# Patient Record
Sex: FEMALE | Race: WHITE | NOT HISPANIC OR LATINO | Employment: OTHER | URBAN - METROPOLITAN AREA
[De-identification: names, ages, dates, MRNs, and addresses within clinical notes are randomized per-mention and may not be internally consistent; named-entity substitution may affect disease eponyms.]

---

## 2017-01-05 ENCOUNTER — AMBULATORY - HEALTHEAST (OUTPATIENT)
Dept: INTERNAL MEDICINE | Facility: CLINIC | Age: 82
End: 2017-01-05

## 2017-01-05 DIAGNOSIS — R91.8 PULMONARY NODULES: ICD-10-CM

## 2017-01-06 ENCOUNTER — HOSPITAL ENCOUNTER (OUTPATIENT)
Dept: CT IMAGING | Facility: CLINIC | Age: 82
Discharge: HOME | End: 2017-01-06
Attending: INTERNAL MEDICINE

## 2017-01-06 DIAGNOSIS — R91.8 PULMONARY NODULES: ICD-10-CM

## 2017-01-09 ENCOUNTER — OFFICE VISIT - HEALTHEAST (OUTPATIENT)
Dept: INTERNAL MEDICINE | Facility: CLINIC | Age: 82
End: 2017-01-09

## 2017-01-09 DIAGNOSIS — R91.8 PULMONARY NODULES: ICD-10-CM

## 2017-01-09 DIAGNOSIS — R55 NEAR SYNCOPE: ICD-10-CM

## 2017-01-09 DIAGNOSIS — I42.8 NONISCHEMIC CARDIOMYOPATHY (H): ICD-10-CM

## 2017-01-09 DIAGNOSIS — F03.90 DEMENTIA (H): ICD-10-CM

## 2017-01-09 ASSESSMENT — MIFFLIN-ST. JEOR: SCORE: 980.66

## 2017-01-19 ENCOUNTER — OFFICE VISIT - HEALTHEAST (OUTPATIENT)
Dept: INTERNAL MEDICINE | Facility: CLINIC | Age: 82
End: 2017-01-19

## 2017-01-19 ENCOUNTER — OFFICE VISIT - HEALTHEAST (OUTPATIENT)
Dept: CARDIOLOGY | Facility: CLINIC | Age: 82
End: 2017-01-19

## 2017-01-19 DIAGNOSIS — I10 ESSENTIAL HYPERTENSION: ICD-10-CM

## 2017-01-19 DIAGNOSIS — F03.90 DEMENTIA (H): ICD-10-CM

## 2017-01-19 DIAGNOSIS — J20.9 ACUTE BRONCHITIS: ICD-10-CM

## 2017-01-19 DIAGNOSIS — I42.8 NONISCHEMIC CARDIOMYOPATHY (H): ICD-10-CM

## 2017-01-19 ASSESSMENT — MIFFLIN-ST. JEOR
SCORE: 967.05
SCORE: 971.59

## 2017-04-04 ENCOUNTER — OFFICE VISIT - HEALTHEAST (OUTPATIENT)
Dept: INTERNAL MEDICINE | Facility: CLINIC | Age: 82
End: 2017-04-04

## 2017-04-04 DIAGNOSIS — M25.511 CHRONIC RIGHT SHOULDER PAIN: ICD-10-CM

## 2017-04-04 DIAGNOSIS — G89.29 CHRONIC RIGHT SHOULDER PAIN: ICD-10-CM

## 2017-04-04 DIAGNOSIS — F03.90 DEMENTIA (H): ICD-10-CM

## 2017-04-04 DIAGNOSIS — I10 ESSENTIAL HYPERTENSION: ICD-10-CM

## 2017-04-04 DIAGNOSIS — K59.09 CHRONIC CONSTIPATION: ICD-10-CM

## 2017-04-04 DIAGNOSIS — K21.9 GERD (GASTROESOPHAGEAL REFLUX DISEASE): ICD-10-CM

## 2017-04-04 DIAGNOSIS — R55 NEAR SYNCOPE: ICD-10-CM

## 2017-04-04 DIAGNOSIS — E03.9 HYPOTHYROIDISM: ICD-10-CM

## 2017-04-04 DIAGNOSIS — R91.8 PULMONARY NODULES: ICD-10-CM

## 2017-04-04 DIAGNOSIS — D64.9 CHRONIC ANEMIA: ICD-10-CM

## 2017-04-04 DIAGNOSIS — M81.0 OSTEOPOROSIS: ICD-10-CM

## 2017-04-04 ASSESSMENT — MIFFLIN-ST. JEOR: SCORE: 985.2

## 2017-05-02 ENCOUNTER — COMMUNICATION - HEALTHEAST (OUTPATIENT)
Dept: INTERNAL MEDICINE | Facility: CLINIC | Age: 82
End: 2017-05-02

## 2017-05-05 ENCOUNTER — OFFICE VISIT - HEALTHEAST (OUTPATIENT)
Dept: INTERNAL MEDICINE | Facility: CLINIC | Age: 82
End: 2017-05-05

## 2017-05-05 DIAGNOSIS — F03.90 DEMENTIA (H): ICD-10-CM

## 2017-05-05 DIAGNOSIS — R63.0 APPETITE LOSS: ICD-10-CM

## 2017-05-05 DIAGNOSIS — M54.9 CHRONIC BACK PAIN: ICD-10-CM

## 2017-05-05 DIAGNOSIS — R53.82 CHRONIC FATIGUE: ICD-10-CM

## 2017-05-05 DIAGNOSIS — I10 ESSENTIAL HYPERTENSION: ICD-10-CM

## 2017-05-05 DIAGNOSIS — E03.9 HYPOTHYROIDISM: ICD-10-CM

## 2017-05-05 DIAGNOSIS — G89.29 CHRONIC BACK PAIN: ICD-10-CM

## 2017-05-05 ASSESSMENT — MIFFLIN-ST. JEOR: SCORE: 976.13

## 2017-05-06 ENCOUNTER — COMMUNICATION - HEALTHEAST (OUTPATIENT)
Dept: INTERNAL MEDICINE | Facility: CLINIC | Age: 82
End: 2017-05-06

## 2017-05-08 ENCOUNTER — COMMUNICATION - HEALTHEAST (OUTPATIENT)
Dept: INTERNAL MEDICINE | Facility: CLINIC | Age: 82
End: 2017-05-08

## 2017-05-22 ENCOUNTER — COMMUNICATION - HEALTHEAST (OUTPATIENT)
Dept: INTERNAL MEDICINE | Facility: CLINIC | Age: 82
End: 2017-05-22

## 2017-05-24 ENCOUNTER — COMMUNICATION - HEALTHEAST (OUTPATIENT)
Dept: INTERNAL MEDICINE | Facility: CLINIC | Age: 82
End: 2017-05-24

## 2017-06-05 ENCOUNTER — COMMUNICATION - HEALTHEAST (OUTPATIENT)
Dept: ADMINISTRATIVE | Facility: CLINIC | Age: 82
End: 2017-06-05

## 2017-06-21 ENCOUNTER — AMBULATORY - HEALTHEAST (OUTPATIENT)
Dept: CARDIOLOGY | Facility: CLINIC | Age: 82
End: 2017-06-21

## 2017-06-21 DIAGNOSIS — R55 PRE-SYNCOPE: ICD-10-CM

## 2017-06-21 DIAGNOSIS — I42.8 NON-ISCHEMIC CARDIOMYOPATHY (H): ICD-10-CM

## 2017-07-20 ENCOUNTER — COMMUNICATION - HEALTHEAST (OUTPATIENT)
Dept: INTERNAL MEDICINE | Facility: CLINIC | Age: 82
End: 2017-07-20

## 2017-07-20 ENCOUNTER — AMBULATORY - HEALTHEAST (OUTPATIENT)
Dept: INTERNAL MEDICINE | Facility: CLINIC | Age: 82
End: 2017-07-20

## 2017-07-25 ENCOUNTER — OFFICE VISIT - HEALTHEAST (OUTPATIENT)
Dept: CARDIOLOGY | Facility: CLINIC | Age: 82
End: 2017-07-25

## 2017-07-25 DIAGNOSIS — I42.8 NONISCHEMIC CARDIOMYOPATHY (H): ICD-10-CM

## 2017-07-25 ASSESSMENT — MIFFLIN-ST. JEOR: SCORE: 961.61

## 2017-07-27 ENCOUNTER — COMMUNICATION - HEALTHEAST (OUTPATIENT)
Dept: INTERNAL MEDICINE | Facility: CLINIC | Age: 82
End: 2017-07-27

## 2017-07-31 ENCOUNTER — HOSPITAL ENCOUNTER (OUTPATIENT)
Dept: CARDIOLOGY | Facility: CLINIC | Age: 82
Discharge: HOME | End: 2017-07-31
Attending: INTERNAL MEDICINE

## 2017-07-31 DIAGNOSIS — I42.8 NON-ISCHEMIC CARDIOMYOPATHY (H): ICD-10-CM

## 2017-07-31 DIAGNOSIS — I42.9 CARDIOMYOPATHY, UNSPECIFIED (H): ICD-10-CM

## 2017-07-31 DIAGNOSIS — R55 PRE-SYNCOPE: ICD-10-CM

## 2017-07-31 LAB
AORTIC ROOT: 2.6 CM
DOP CALC LVOT AREA: 3.46 CM2
DOP CALC LVOT DIAMETER: 2.1 CM
DOP CALC LVOT PEAK VEL: 72.1 CM/S
DOP CALC LVOT STROKE VOLUME: 47.1 CM3
DOP CALCLVOT PEAK VEL VTI: 13.6 CM
EJECTION FRACTION: 53 % (ref 55–75)
FRACTIONAL SHORTENING: 27.8 % (ref 28–44)
INTERVENTRICULAR SEPTUM IN END DIASTOLE: 1.6 CM (ref 0.6–0.9)
IVS/PW RATIO: 1.3
LEFT ATRIUM AREA: 14.2 CM2
LEFT ATRIUM SIZE: 2.5 CM
LEFT ATRIUM TO AORTIC ROOT RATIO: 0.96 NO UNITS
LEFT VENTRICLE DIASTOLIC VOLUME: 62 CM3 (ref 46–106)
LEFT VENTRICLE SYSTOLIC VOLUME: 29 CM3 (ref 14–42)
LEFT VENTRICULAR INTERNAL DIMENSION IN DIASTOLE: 3.6 CM (ref 3.8–5.2)
LEFT VENTRICULAR INTERNAL DIMENSION IN SYSTOLE: 2.6 CM (ref 2.2–3.5)
LEFT VENTRICULAR MASS: 179.9 G
LEFT VENTRICULAR OUTFLOW TRACT MEAN GRADIENT: 1 MMHG
LEFT VENTRICULAR OUTFLOW TRACT MEAN VELOCITY: 43.6 CM/S
LEFT VENTRICULAR OUTFLOW TRACT PEAK GRADIENT: 2 MMHG
LEFT VENTRICULAR POSTERIOR WALL IN END DIASTOLE: 1.2 CM (ref 0.6–0.9)
MITRAL VALVE E/A RATIO: 0.7
MV AVERAGE E/E' RATIO: 12 CM/S
MV DECELERATION TIME: 268 MS
MV E'TISSUE VEL-LAT: 3.61 CM/S
MV E'TISSUE VEL-MED: 3.8 CM/S
MV LATERAL E/E' RATIO: 12.4
MV MEDIAL E/E' RATIO: 11.7
MV PEAK A VELOCITY: 66.2 CM/S
MV PEAK E VELOCITY: 44.6 CM/S
PR MAX PG: 2 MMHG
PR PEAK VELOCITY: 62.5 CM/S
TRICUSPID REGURGITATION PEAK PRESSURE GRADIENT: 28.1 MMHG
TRICUSPID VALVE ANULAR PLANE SYSTOLIC EXCURSION: 1.9 CM
TRICUSPID VALVE PEAK REGURGITANT VELOCITY: 265 CM/S

## 2017-08-01 ENCOUNTER — OFFICE VISIT - HEALTHEAST (OUTPATIENT)
Dept: INTERNAL MEDICINE | Facility: CLINIC | Age: 82
End: 2017-08-01

## 2017-08-01 DIAGNOSIS — I42.8 NONISCHEMIC CARDIOMYOPATHY (H): ICD-10-CM

## 2017-08-01 DIAGNOSIS — M81.0 OSTEOPOROSIS: ICD-10-CM

## 2017-08-01 DIAGNOSIS — E78.5 HYPERLIPIDEMIA: ICD-10-CM

## 2017-08-01 DIAGNOSIS — R91.8 PULMONARY NODULES: ICD-10-CM

## 2017-08-01 DIAGNOSIS — K21.9 GERD (GASTROESOPHAGEAL REFLUX DISEASE): ICD-10-CM

## 2017-08-01 DIAGNOSIS — E55.9 VITAMIN D DEFICIENCY: ICD-10-CM

## 2017-08-01 DIAGNOSIS — F03.90 DEMENTIA (H): ICD-10-CM

## 2017-08-01 DIAGNOSIS — D64.9 CHRONIC ANEMIA: ICD-10-CM

## 2017-08-01 DIAGNOSIS — Z00.00 MEDICARE ANNUAL WELLNESS VISIT, SUBSEQUENT: ICD-10-CM

## 2017-08-01 DIAGNOSIS — I10 ESSENTIAL HYPERTENSION: ICD-10-CM

## 2017-08-01 DIAGNOSIS — E03.9 HYPOTHYROIDISM: ICD-10-CM

## 2017-08-01 DIAGNOSIS — M54.9 CHRONIC BACK PAIN: ICD-10-CM

## 2017-08-01 DIAGNOSIS — G89.29 CHRONIC BACK PAIN: ICD-10-CM

## 2017-08-01 LAB
CHOLEST SERPL-MCNC: 258 MG/DL
FASTING STATUS PATIENT QL REPORTED: YES
HDLC SERPL-MCNC: 56 MG/DL
LDLC SERPL CALC-MCNC: 169 MG/DL
TRIGL SERPL-MCNC: 167 MG/DL

## 2017-08-01 ASSESSMENT — MIFFLIN-ST. JEOR: SCORE: 957.98

## 2017-08-03 ENCOUNTER — COMMUNICATION - HEALTHEAST (OUTPATIENT)
Dept: INTERNAL MEDICINE | Facility: CLINIC | Age: 82
End: 2017-08-03

## 2017-08-03 ENCOUNTER — HOSPITAL ENCOUNTER (OUTPATIENT)
Dept: CT IMAGING | Facility: HOSPITAL | Age: 82
Discharge: HOME | End: 2017-08-03
Attending: INTERNAL MEDICINE

## 2017-08-03 DIAGNOSIS — R91.8 PULMONARY NODULES: ICD-10-CM

## 2017-08-07 ENCOUNTER — COMMUNICATION - HEALTHEAST (OUTPATIENT)
Dept: INTERNAL MEDICINE | Facility: CLINIC | Age: 82
End: 2017-08-07

## 2017-08-08 ENCOUNTER — OFFICE VISIT - HEALTHEAST (OUTPATIENT)
Dept: CARDIOLOGY | Facility: CLINIC | Age: 82
End: 2017-08-08

## 2017-08-08 DIAGNOSIS — I42.8 NONISCHEMIC CARDIOMYOPATHY (H): ICD-10-CM

## 2017-08-08 DIAGNOSIS — I10 ESSENTIAL HYPERTENSION WITH GOAL BLOOD PRESSURE LESS THAN 130/80: ICD-10-CM

## 2017-08-08 DIAGNOSIS — I47.29 NSVT (NONSUSTAINED VENTRICULAR TACHYCARDIA) (H): ICD-10-CM

## 2017-08-08 DIAGNOSIS — R55 NEAR SYNCOPE: ICD-10-CM

## 2017-08-08 ASSESSMENT — MIFFLIN-ST. JEOR: SCORE: 982.92

## 2017-08-31 ENCOUNTER — RECORDS - HEALTHEAST (OUTPATIENT)
Dept: ADMINISTRATIVE | Facility: OTHER | Age: 82
End: 2017-08-31

## 2017-08-31 ENCOUNTER — RECORDS - HEALTHEAST (OUTPATIENT)
Dept: BONE DENSITY | Facility: CLINIC | Age: 82
End: 2017-08-31

## 2017-08-31 DIAGNOSIS — M81.0 AGE-RELATED OSTEOPOROSIS WITHOUT CURRENT PATHOLOGICAL FRACTURE: ICD-10-CM

## 2017-09-11 ENCOUNTER — COMMUNICATION - HEALTHEAST (OUTPATIENT)
Dept: INTERNAL MEDICINE | Facility: CLINIC | Age: 82
End: 2017-09-11

## 2017-10-10 ENCOUNTER — AMBULATORY - HEALTHEAST (OUTPATIENT)
Dept: NURSING | Facility: CLINIC | Age: 82
End: 2017-10-10

## 2017-11-02 ENCOUNTER — AMBULATORY - HEALTHEAST (OUTPATIENT)
Dept: NURSING | Facility: CLINIC | Age: 82
End: 2017-11-02

## 2017-11-02 ENCOUNTER — AMBULATORY - HEALTHEAST (OUTPATIENT)
Dept: INTERNAL MEDICINE | Facility: CLINIC | Age: 82
End: 2017-11-02

## 2017-11-02 DIAGNOSIS — Z23 FLU VACCINE NEED: ICD-10-CM

## 2017-11-27 ENCOUNTER — AMBULATORY - HEALTHEAST (OUTPATIENT)
Dept: INTERNAL MEDICINE | Facility: CLINIC | Age: 82
End: 2017-11-27

## 2017-11-27 DIAGNOSIS — R11.2 NAUSEA AND VOMITING: ICD-10-CM

## 2017-11-28 ENCOUNTER — COMMUNICATION - HEALTHEAST (OUTPATIENT)
Dept: INTERNAL MEDICINE | Facility: CLINIC | Age: 82
End: 2017-11-28

## 2017-11-28 DIAGNOSIS — E03.9 HYPOTHYROIDISM, UNSPECIFIED TYPE: ICD-10-CM

## 2017-11-28 DIAGNOSIS — R11.0 NAUSEA: ICD-10-CM

## 2017-11-29 ENCOUNTER — RECORDS - HEALTHEAST (OUTPATIENT)
Dept: ADMINISTRATIVE | Facility: OTHER | Age: 82
End: 2017-11-29

## 2017-11-30 ENCOUNTER — COMMUNICATION - HEALTHEAST (OUTPATIENT)
Dept: INTERNAL MEDICINE | Facility: CLINIC | Age: 82
End: 2017-11-30

## 2017-11-30 ENCOUNTER — RECORDS - HEALTHEAST (OUTPATIENT)
Dept: ADMINISTRATIVE | Facility: OTHER | Age: 82
End: 2017-11-30

## 2017-12-02 ENCOUNTER — RECORDS - HEALTHEAST (OUTPATIENT)
Dept: ADMINISTRATIVE | Facility: OTHER | Age: 82
End: 2017-12-02

## 2017-12-05 ENCOUNTER — OFFICE VISIT - HEALTHEAST (OUTPATIENT)
Dept: INTERNAL MEDICINE | Facility: CLINIC | Age: 82
End: 2017-12-05

## 2017-12-05 DIAGNOSIS — K21.9 GERD (GASTROESOPHAGEAL REFLUX DISEASE): ICD-10-CM

## 2017-12-05 DIAGNOSIS — F03.90 DEMENTIA (H): ICD-10-CM

## 2017-12-05 DIAGNOSIS — K59.09 CHRONIC CONSTIPATION: ICD-10-CM

## 2017-12-05 DIAGNOSIS — J69.0 ASPIRATION PNEUMONIA (H): ICD-10-CM

## 2017-12-05 DIAGNOSIS — R11.2 NAUSEA AND VOMITING: ICD-10-CM

## 2017-12-05 ASSESSMENT — MIFFLIN-ST. JEOR: SCORE: 969.32

## 2018-01-12 ENCOUNTER — COMMUNICATION - HEALTHEAST (OUTPATIENT)
Dept: CARDIOLOGY | Facility: CLINIC | Age: 83
End: 2018-01-12

## 2018-01-12 DIAGNOSIS — I42.8 NONISCHEMIC CARDIOMYOPATHY (H): ICD-10-CM

## 2018-01-31 ENCOUNTER — COMMUNICATION - HEALTHEAST (OUTPATIENT)
Dept: INTERNAL MEDICINE | Facility: CLINIC | Age: 83
End: 2018-01-31

## 2018-01-31 ENCOUNTER — COMMUNICATION - HEALTHEAST (OUTPATIENT)
Dept: GENERAL RADIOLOGY | Facility: CLINIC | Age: 83
End: 2018-01-31

## 2018-03-13 ENCOUNTER — OFFICE VISIT - HEALTHEAST (OUTPATIENT)
Dept: INTERNAL MEDICINE | Facility: CLINIC | Age: 83
End: 2018-03-13

## 2018-03-13 DIAGNOSIS — F03.90 DEMENTIA (H): ICD-10-CM

## 2018-03-13 DIAGNOSIS — R11.2 NAUSEA AND VOMITING: ICD-10-CM

## 2018-03-13 DIAGNOSIS — D64.9 CHRONIC ANEMIA: ICD-10-CM

## 2018-03-13 DIAGNOSIS — I10 ESSENTIAL HYPERTENSION: ICD-10-CM

## 2018-03-13 DIAGNOSIS — E03.9 HYPOTHYROIDISM: ICD-10-CM

## 2018-03-13 DIAGNOSIS — K59.09 CHRONIC CONSTIPATION: ICD-10-CM

## 2018-03-13 DIAGNOSIS — E44.0 MODERATE PROTEIN-CALORIE MALNUTRITION (H): ICD-10-CM

## 2018-03-13 ASSESSMENT — MIFFLIN-ST. JEOR: SCORE: 955.89

## 2018-03-14 LAB
ALBUMIN SERPL-MCNC: 3.3 G/DL (ref 3.5–5)
ALP SERPL-CCNC: 56 U/L (ref 45–120)
ALT SERPL W P-5'-P-CCNC: 12 U/L (ref 0–45)
ANION GAP SERPL CALCULATED.3IONS-SCNC: 10 MMOL/L (ref 5–18)
AST SERPL W P-5'-P-CCNC: 17 U/L (ref 0–40)
BILIRUB SERPL-MCNC: 0.3 MG/DL (ref 0–1)
BUN SERPL-MCNC: 27 MG/DL (ref 8–28)
CALCIUM SERPL-MCNC: 9.3 MG/DL (ref 8.5–10.5)
CHLORIDE BLD-SCNC: 102 MMOL/L (ref 98–107)
CO2 SERPL-SCNC: 25 MMOL/L (ref 22–31)
CREAT SERPL-MCNC: 1.19 MG/DL (ref 0.6–1.1)
ERYTHROCYTE [DISTWIDTH] IN BLOOD BY AUTOMATED COUNT: 12.3 % (ref 11–14.5)
GFR SERPL CREATININE-BSD FRML MDRD: 43 ML/MIN/1.73M2
GLUCOSE BLD-MCNC: 94 MG/DL (ref 70–125)
HCT VFR BLD AUTO: 37.2 % (ref 35–47)
HGB BLD-MCNC: 12.3 G/DL (ref 12–16)
LIPASE SERPL-CCNC: 59 U/L (ref 0–52)
MCH RBC QN AUTO: 32 PG (ref 27–34)
MCHC RBC AUTO-ENTMCNC: 33.2 G/DL (ref 32–36)
MCV RBC AUTO: 97 FL (ref 80–100)
PLATELET # BLD AUTO: 261 THOU/UL (ref 140–440)
PMV BLD AUTO: 7.8 FL (ref 7–10)
POTASSIUM BLD-SCNC: 4.9 MMOL/L (ref 3.5–5)
PROT SERPL-MCNC: 6.6 G/DL (ref 6–8)
RBC # BLD AUTO: 3.85 MILL/UL (ref 3.8–5.4)
SODIUM SERPL-SCNC: 137 MMOL/L (ref 136–145)
TSH SERPL DL<=0.005 MIU/L-ACNC: 0.64 UIU/ML (ref 0.3–5)
WBC: 6.5 THOU/UL (ref 4–11)

## 2018-03-15 ENCOUNTER — COMMUNICATION - HEALTHEAST (OUTPATIENT)
Dept: INTERNAL MEDICINE | Facility: CLINIC | Age: 83
End: 2018-03-15

## 2018-03-29 ENCOUNTER — COMMUNICATION - HEALTHEAST (OUTPATIENT)
Dept: INTERNAL MEDICINE | Facility: CLINIC | Age: 83
End: 2018-03-29

## 2018-05-18 ENCOUNTER — COMMUNICATION - HEALTHEAST (OUTPATIENT)
Dept: INTERNAL MEDICINE | Facility: CLINIC | Age: 83
End: 2018-05-18

## 2018-05-18 DIAGNOSIS — I10 ESSENTIAL HYPERTENSION: ICD-10-CM

## 2018-06-14 ENCOUNTER — OFFICE VISIT - HEALTHEAST (OUTPATIENT)
Dept: INTERNAL MEDICINE | Facility: CLINIC | Age: 83
End: 2018-06-14

## 2018-06-14 ENCOUNTER — COMMUNICATION - HEALTHEAST (OUTPATIENT)
Dept: INTERNAL MEDICINE | Facility: CLINIC | Age: 83
End: 2018-06-14

## 2018-06-14 DIAGNOSIS — M81.0 OSTEOPOROSIS: ICD-10-CM

## 2018-06-14 DIAGNOSIS — F03.90 DEMENTIA (H): ICD-10-CM

## 2018-06-14 DIAGNOSIS — R53.82 CHRONIC FATIGUE: ICD-10-CM

## 2018-06-14 DIAGNOSIS — R11.2 NAUSEA AND VOMITING: ICD-10-CM

## 2018-06-14 DIAGNOSIS — D64.9 CHRONIC ANEMIA: ICD-10-CM

## 2018-06-14 DIAGNOSIS — K59.09 CHRONIC CONSTIPATION: ICD-10-CM

## 2018-06-14 DIAGNOSIS — E03.9 HYPOTHYROIDISM: ICD-10-CM

## 2018-06-14 DIAGNOSIS — I10 ESSENTIAL HYPERTENSION: ICD-10-CM

## 2018-06-14 LAB — HGB BLD-MCNC: 12.3 G/DL (ref 12–16)

## 2018-06-14 ASSESSMENT — MIFFLIN-ST. JEOR: SCORE: 969.32

## 2018-07-17 ENCOUNTER — RECORDS - HEALTHEAST (OUTPATIENT)
Dept: ADMINISTRATIVE | Facility: OTHER | Age: 83
End: 2018-07-17

## 2018-08-07 ENCOUNTER — RECORDS - HEALTHEAST (OUTPATIENT)
Dept: ADMINISTRATIVE | Facility: OTHER | Age: 83
End: 2018-08-07

## 2018-09-06 ENCOUNTER — COMMUNICATION - HEALTHEAST (OUTPATIENT)
Dept: CARDIOLOGY | Facility: CLINIC | Age: 83
End: 2018-09-06

## 2018-09-06 DIAGNOSIS — I42.8 NONISCHEMIC CARDIOMYOPATHY (H): ICD-10-CM

## 2018-09-10 ENCOUNTER — RECORDS - HEALTHEAST (OUTPATIENT)
Dept: ADMINISTRATIVE | Facility: OTHER | Age: 83
End: 2018-09-10

## 2018-09-14 ENCOUNTER — OFFICE VISIT - HEALTHEAST (OUTPATIENT)
Dept: CARDIOLOGY | Facility: CLINIC | Age: 83
End: 2018-09-14

## 2018-09-14 DIAGNOSIS — I42.8 NONISCHEMIC CARDIOMYOPATHY (H): ICD-10-CM

## 2018-09-14 ASSESSMENT — MIFFLIN-ST. JEOR: SCORE: 964.78

## 2018-09-15 ENCOUNTER — COMMUNICATION - HEALTHEAST (OUTPATIENT)
Dept: INTERNAL MEDICINE | Facility: CLINIC | Age: 83
End: 2018-09-15

## 2018-09-15 DIAGNOSIS — E03.9 HYPOTHYROIDISM, UNSPECIFIED TYPE: ICD-10-CM

## 2018-09-26 ENCOUNTER — COMMUNICATION - HEALTHEAST (OUTPATIENT)
Dept: INTERNAL MEDICINE | Facility: CLINIC | Age: 83
End: 2018-09-26

## 2018-09-26 DIAGNOSIS — R11.0 NAUSEA: ICD-10-CM

## 2018-12-21 ENCOUNTER — RECORDS - HEALTHEAST (OUTPATIENT)
Dept: GENERAL RADIOLOGY | Facility: CLINIC | Age: 83
End: 2018-12-21

## 2018-12-21 ENCOUNTER — OFFICE VISIT - HEALTHEAST (OUTPATIENT)
Dept: INTERNAL MEDICINE | Facility: CLINIC | Age: 83
End: 2018-12-21

## 2018-12-21 DIAGNOSIS — E55.9 VITAMIN D DEFICIENCY: ICD-10-CM

## 2018-12-21 DIAGNOSIS — E03.9 HYPOTHYROIDISM, UNSPECIFIED TYPE: ICD-10-CM

## 2018-12-21 DIAGNOSIS — G89.29 CHRONIC BACK PAIN, UNSPECIFIED BACK LOCATION, UNSPECIFIED BACK PAIN LATERALITY: ICD-10-CM

## 2018-12-21 DIAGNOSIS — E78.5 HYPERLIPIDEMIA, UNSPECIFIED HYPERLIPIDEMIA TYPE: ICD-10-CM

## 2018-12-21 DIAGNOSIS — I10 ESSENTIAL HYPERTENSION: ICD-10-CM

## 2018-12-21 DIAGNOSIS — F03.90 DEMENTIA WITHOUT BEHAVIORAL DISTURBANCE, UNSPECIFIED DEMENTIA TYPE: ICD-10-CM

## 2018-12-21 DIAGNOSIS — M54.9 CHRONIC BACK PAIN, UNSPECIFIED BACK LOCATION, UNSPECIFIED BACK PAIN LATERALITY: ICD-10-CM

## 2018-12-21 DIAGNOSIS — Z00.00 MEDICARE ANNUAL WELLNESS VISIT, SUBSEQUENT: ICD-10-CM

## 2018-12-21 DIAGNOSIS — D50.9 IRON DEFICIENCY ANEMIA, UNSPECIFIED IRON DEFICIENCY ANEMIA TYPE: ICD-10-CM

## 2018-12-21 DIAGNOSIS — M54.9 DORSALGIA, UNSPECIFIED: ICD-10-CM

## 2018-12-21 DIAGNOSIS — M41.9 SCOLIOSIS OF LUMBAR SPINE, UNSPECIFIED SCOLIOSIS TYPE: ICD-10-CM

## 2018-12-21 DIAGNOSIS — I47.29 NSVT (NONSUSTAINED VENTRICULAR TACHYCARDIA) (H): ICD-10-CM

## 2018-12-21 DIAGNOSIS — K21.9 GASTROESOPHAGEAL REFLUX DISEASE WITHOUT ESOPHAGITIS: ICD-10-CM

## 2018-12-21 DIAGNOSIS — M81.0 AGE-RELATED OSTEOPOROSIS WITHOUT CURRENT PATHOLOGICAL FRACTURE: ICD-10-CM

## 2018-12-21 DIAGNOSIS — I42.8 NONISCHEMIC CARDIOMYOPATHY (H): ICD-10-CM

## 2018-12-21 DIAGNOSIS — G89.29 OTHER CHRONIC PAIN: ICD-10-CM

## 2018-12-21 DIAGNOSIS — H61.91 SKIN LESION OF RIGHT EAR: ICD-10-CM

## 2018-12-21 LAB
ALBUMIN SERPL-MCNC: 3.6 G/DL (ref 3.5–5)
ALBUMIN UR-MCNC: NEGATIVE MG/DL
ALP SERPL-CCNC: 58 U/L (ref 45–120)
ALT SERPL W P-5'-P-CCNC: 14 U/L (ref 0–45)
ANION GAP SERPL CALCULATED.3IONS-SCNC: 10 MMOL/L (ref 5–18)
APPEARANCE UR: CLEAR
AST SERPL W P-5'-P-CCNC: 19 U/L (ref 0–40)
BACTERIA #/AREA URNS HPF: ABNORMAL HPF
BILIRUB SERPL-MCNC: 0.4 MG/DL (ref 0–1)
BILIRUB UR QL STRIP: NEGATIVE
BUN SERPL-MCNC: 25 MG/DL (ref 8–28)
CALCIUM SERPL-MCNC: 9.6 MG/DL (ref 8.5–10.5)
CHLORIDE BLD-SCNC: 101 MMOL/L (ref 98–107)
CHOLEST SERPL-MCNC: 290 MG/DL
CO2 SERPL-SCNC: 27 MMOL/L (ref 22–31)
COLOR UR AUTO: YELLOW
CREAT SERPL-MCNC: 0.96 MG/DL (ref 0.6–1.1)
FASTING STATUS PATIENT QL REPORTED: YES
GFR SERPL CREATININE-BSD FRML MDRD: 56 ML/MIN/1.73M2
GLUCOSE BLD-MCNC: 82 MG/DL (ref 70–125)
GLUCOSE UR STRIP-MCNC: NEGATIVE MG/DL
HDLC SERPL-MCNC: 66 MG/DL
HGB BLD-MCNC: 13.1 G/DL (ref 12–16)
HGB UR QL STRIP: NEGATIVE
IRON SATN MFR SERPL: 25 % (ref 20–50)
IRON SERPL-MCNC: 86 UG/DL (ref 42–175)
KETONES UR STRIP-MCNC: NEGATIVE MG/DL
LDLC SERPL CALC-MCNC: 190 MG/DL
LEUKOCYTE ESTERASE UR QL STRIP: ABNORMAL
NITRATE UR QL: NEGATIVE
PH UR STRIP: 5.5 [PH] (ref 4.5–8)
POTASSIUM BLD-SCNC: 4.8 MMOL/L (ref 3.5–5)
PROT SERPL-MCNC: 6.9 G/DL (ref 6–8)
RBC #/AREA URNS AUTO: ABNORMAL HPF
SODIUM SERPL-SCNC: 138 MMOL/L (ref 136–145)
SP GR UR STRIP: 1.02 (ref 1–1.03)
SQUAMOUS #/AREA URNS AUTO: ABNORMAL LPF
TIBC SERPL-MCNC: 342 UG/DL (ref 313–563)
TRANS CELLS #/AREA URNS HPF: ABNORMAL LPF
TRANSFERRIN SERPL-MCNC: 274 MG/DL (ref 212–360)
TRIGL SERPL-MCNC: 168 MG/DL
TSH SERPL DL<=0.005 MIU/L-ACNC: 1.17 UIU/ML (ref 0.3–5)
UROBILINOGEN UR STRIP-ACNC: ABNORMAL
VIT B12 SERPL-MCNC: 552 PG/ML (ref 213–816)
WBC #/AREA URNS AUTO: ABNORMAL HPF

## 2018-12-21 ASSESSMENT — MIFFLIN-ST. JEOR: SCORE: 987.46

## 2018-12-24 ENCOUNTER — COMMUNICATION - HEALTHEAST (OUTPATIENT)
Dept: INTERNAL MEDICINE | Facility: CLINIC | Age: 83
End: 2018-12-24

## 2018-12-24 LAB — 25(OH)D3 SERPL-MCNC: 61.5 NG/ML (ref 30–80)

## 2018-12-26 ENCOUNTER — COMMUNICATION - HEALTHEAST (OUTPATIENT)
Dept: INTERNAL MEDICINE | Facility: CLINIC | Age: 83
End: 2018-12-26

## 2019-01-17 ENCOUNTER — COMMUNICATION - HEALTHEAST (OUTPATIENT)
Dept: INTERNAL MEDICINE | Facility: CLINIC | Age: 84
End: 2019-01-17

## 2019-03-07 ENCOUNTER — COMMUNICATION - HEALTHEAST (OUTPATIENT)
Dept: INTERNAL MEDICINE | Facility: CLINIC | Age: 84
End: 2019-03-07

## 2019-03-07 DIAGNOSIS — E03.9 HYPOTHYROIDISM, UNSPECIFIED TYPE: ICD-10-CM

## 2019-03-12 ENCOUNTER — RECORDS - HEALTHEAST (OUTPATIENT)
Dept: ADMINISTRATIVE | Facility: OTHER | Age: 84
End: 2019-03-12

## 2019-03-13 ENCOUNTER — RECORDS - HEALTHEAST (OUTPATIENT)
Dept: ADMINISTRATIVE | Facility: OTHER | Age: 84
End: 2019-03-13

## 2019-03-15 ENCOUNTER — COMMUNICATION - HEALTHEAST (OUTPATIENT)
Dept: INTERNAL MEDICINE | Facility: CLINIC | Age: 84
End: 2019-03-15

## 2019-03-15 DIAGNOSIS — I10 ESSENTIAL HYPERTENSION: ICD-10-CM

## 2019-03-19 ENCOUNTER — RECORDS - HEALTHEAST (OUTPATIENT)
Dept: ADMINISTRATIVE | Facility: OTHER | Age: 84
End: 2019-03-19

## 2019-03-30 ENCOUNTER — COMMUNICATION - HEALTHEAST (OUTPATIENT)
Dept: CARDIOLOGY | Facility: CLINIC | Age: 84
End: 2019-03-30

## 2019-03-30 DIAGNOSIS — I42.8 NONISCHEMIC CARDIOMYOPATHY (H): ICD-10-CM

## 2019-04-08 ENCOUNTER — COMMUNICATION - HEALTHEAST (OUTPATIENT)
Dept: INTERNAL MEDICINE | Facility: CLINIC | Age: 84
End: 2019-04-08

## 2019-05-10 ENCOUNTER — COMMUNICATION - HEALTHEAST (OUTPATIENT)
Dept: INTERNAL MEDICINE | Facility: CLINIC | Age: 84
End: 2019-05-10

## 2019-05-29 ENCOUNTER — COMMUNICATION - HEALTHEAST (OUTPATIENT)
Dept: INTERNAL MEDICINE | Facility: CLINIC | Age: 84
End: 2019-05-29

## 2019-05-29 DIAGNOSIS — E03.9 HYPOTHYROIDISM, UNSPECIFIED TYPE: ICD-10-CM

## 2019-06-18 ENCOUNTER — COMMUNICATION - HEALTHEAST (OUTPATIENT)
Dept: INTERNAL MEDICINE | Facility: CLINIC | Age: 84
End: 2019-06-18

## 2019-06-18 DIAGNOSIS — M54.9 CHRONIC BILATERAL BACK PAIN, UNSPECIFIED BACK LOCATION: ICD-10-CM

## 2019-06-18 DIAGNOSIS — E55.9 VITAMIN D DEFICIENCY: ICD-10-CM

## 2019-06-18 DIAGNOSIS — G89.29 CHRONIC BILATERAL BACK PAIN, UNSPECIFIED BACK LOCATION: ICD-10-CM

## 2019-07-10 ENCOUNTER — COMMUNICATION - HEALTHEAST (OUTPATIENT)
Dept: CARDIOLOGY | Facility: CLINIC | Age: 84
End: 2019-07-10

## 2019-07-10 ENCOUNTER — COMMUNICATION - HEALTHEAST (OUTPATIENT)
Dept: INTERNAL MEDICINE | Facility: CLINIC | Age: 84
End: 2019-07-10

## 2019-07-10 DIAGNOSIS — I42.8 NONISCHEMIC CARDIOMYOPATHY (H): ICD-10-CM

## 2019-07-10 DIAGNOSIS — F03.90 DEMENTIA WITHOUT BEHAVIORAL DISTURBANCE, UNSPECIFIED DEMENTIA TYPE: ICD-10-CM

## 2019-07-18 ENCOUNTER — OFFICE VISIT - HEALTHEAST (OUTPATIENT)
Dept: INTERNAL MEDICINE | Facility: CLINIC | Age: 84
End: 2019-07-18

## 2019-07-18 DIAGNOSIS — M79.605 LOW BACK PAIN RADIATING TO BOTH LEGS: ICD-10-CM

## 2019-07-18 DIAGNOSIS — M54.50 LOW BACK PAIN RADIATING TO BOTH LEGS: ICD-10-CM

## 2019-07-18 DIAGNOSIS — I10 ESSENTIAL HYPERTENSION: ICD-10-CM

## 2019-07-18 DIAGNOSIS — R53.82 CHRONIC FATIGUE: ICD-10-CM

## 2019-07-18 DIAGNOSIS — M79.604 LOW BACK PAIN RADIATING TO BOTH LEGS: ICD-10-CM

## 2019-07-18 DIAGNOSIS — M41.9 SCOLIOSIS OF LUMBAR SPINE, UNSPECIFIED SCOLIOSIS TYPE: ICD-10-CM

## 2019-07-18 DIAGNOSIS — E03.9 HYPOTHYROIDISM, UNSPECIFIED TYPE: ICD-10-CM

## 2019-07-18 DIAGNOSIS — M81.0 AGE RELATED OSTEOPOROSIS, UNSPECIFIED PATHOLOGICAL FRACTURE PRESENCE: ICD-10-CM

## 2019-07-18 DIAGNOSIS — F03.90 DEMENTIA WITHOUT BEHAVIORAL DISTURBANCE, UNSPECIFIED DEMENTIA TYPE: ICD-10-CM

## 2019-07-18 LAB
ALBUMIN SERPL-MCNC: 3.5 G/DL (ref 3.5–5)
ALP SERPL-CCNC: 55 U/L (ref 45–120)
ALT SERPL W P-5'-P-CCNC: 16 U/L (ref 0–45)
ANION GAP SERPL CALCULATED.3IONS-SCNC: 10 MMOL/L (ref 5–18)
AST SERPL W P-5'-P-CCNC: 19 U/L (ref 0–40)
BILIRUB SERPL-MCNC: 0.3 MG/DL (ref 0–1)
BUN SERPL-MCNC: 33 MG/DL (ref 8–28)
CALCIUM SERPL-MCNC: 9.8 MG/DL (ref 8.5–10.5)
CHLORIDE BLD-SCNC: 101 MMOL/L (ref 98–107)
CO2 SERPL-SCNC: 25 MMOL/L (ref 22–31)
CREAT SERPL-MCNC: 1.24 MG/DL (ref 0.6–1.1)
ERYTHROCYTE [DISTWIDTH] IN BLOOD BY AUTOMATED COUNT: 11.9 % (ref 11–14.5)
GFR SERPL CREATININE-BSD FRML MDRD: 41 ML/MIN/1.73M2
GLUCOSE BLD-MCNC: 90 MG/DL (ref 70–125)
HCT VFR BLD AUTO: 36.4 % (ref 35–47)
HGB BLD-MCNC: 12.3 G/DL (ref 12–16)
MCH RBC QN AUTO: 32.8 PG (ref 27–34)
MCHC RBC AUTO-ENTMCNC: 33.9 G/DL (ref 32–36)
MCV RBC AUTO: 97 FL (ref 80–100)
PLATELET # BLD AUTO: 258 THOU/UL (ref 140–440)
PMV BLD AUTO: 6.8 FL (ref 7–10)
POTASSIUM BLD-SCNC: 4.7 MMOL/L (ref 3.5–5)
PROT SERPL-MCNC: 6.4 G/DL (ref 6–8)
RBC # BLD AUTO: 3.75 MILL/UL (ref 3.8–5.4)
SODIUM SERPL-SCNC: 136 MMOL/L (ref 136–145)
TSH SERPL DL<=0.005 MIU/L-ACNC: 0.89 UIU/ML (ref 0.3–5)
WBC: 7.3 THOU/UL (ref 4–11)

## 2019-07-18 ASSESSMENT — MIFFLIN-ST. JEOR: SCORE: 982.92

## 2019-07-20 ENCOUNTER — COMMUNICATION - HEALTHEAST (OUTPATIENT)
Dept: INTERNAL MEDICINE | Facility: CLINIC | Age: 84
End: 2019-07-20

## 2019-07-24 ENCOUNTER — COMMUNICATION - HEALTHEAST (OUTPATIENT)
Dept: GENERAL RADIOLOGY | Facility: CLINIC | Age: 84
End: 2019-07-24

## 2019-07-26 ENCOUNTER — AMBULATORY - HEALTHEAST (OUTPATIENT)
Dept: NURSING | Facility: CLINIC | Age: 84
End: 2019-07-26

## 2019-07-30 ENCOUNTER — HOSPITAL ENCOUNTER (OUTPATIENT)
Dept: MRI IMAGING | Facility: HOSPITAL | Age: 84
Discharge: HOME | End: 2019-07-30
Attending: INTERNAL MEDICINE

## 2019-07-30 DIAGNOSIS — M79.605 LOW BACK PAIN RADIATING TO BOTH LEGS: ICD-10-CM

## 2019-07-30 DIAGNOSIS — M54.50 LOW BACK PAIN RADIATING TO BOTH LEGS: ICD-10-CM

## 2019-07-30 DIAGNOSIS — M79.604 LOW BACK PAIN RADIATING TO BOTH LEGS: ICD-10-CM

## 2019-08-01 ENCOUNTER — COMMUNICATION - HEALTHEAST (OUTPATIENT)
Dept: INTERNAL MEDICINE | Facility: CLINIC | Age: 84
End: 2019-08-01

## 2019-08-02 ENCOUNTER — HOSPITAL ENCOUNTER (OUTPATIENT)
Dept: PHYSICAL MEDICINE AND REHAB | Facility: CLINIC | Age: 84
Discharge: HOME | End: 2019-08-02
Attending: INTERNAL MEDICINE

## 2019-08-02 DIAGNOSIS — M41.9 SCOLIOSIS OF THORACOLUMBAR SPINE, UNSPECIFIED SCOLIOSIS TYPE: ICD-10-CM

## 2019-08-02 DIAGNOSIS — M51.369 DEGENERATION OF LUMBAR INTERVERTEBRAL DISC: ICD-10-CM

## 2019-08-02 DIAGNOSIS — M47.816 LUMBAR FACET ARTHROPATHY: ICD-10-CM

## 2019-08-02 DIAGNOSIS — M48.062 SPINAL STENOSIS OF LUMBAR REGION WITH NEUROGENIC CLAUDICATION: ICD-10-CM

## 2019-08-19 ENCOUNTER — HOSPITAL ENCOUNTER (OUTPATIENT)
Dept: PHYSICAL MEDICINE AND REHAB | Facility: CLINIC | Age: 84
Discharge: HOME | End: 2019-08-19
Attending: PHYSICIAN ASSISTANT

## 2019-08-19 DIAGNOSIS — M41.9 SCOLIOSIS OF THORACOLUMBAR SPINE, UNSPECIFIED SCOLIOSIS TYPE: ICD-10-CM

## 2019-08-19 DIAGNOSIS — M48.062 SPINAL STENOSIS OF LUMBAR REGION WITH NEUROGENIC CLAUDICATION: ICD-10-CM

## 2019-09-05 ENCOUNTER — HOSPITAL ENCOUNTER (OUTPATIENT)
Dept: PHYSICAL MEDICINE AND REHAB | Facility: CLINIC | Age: 84
Discharge: HOME | End: 2019-09-05
Attending: PHYSICIAN ASSISTANT

## 2019-09-05 DIAGNOSIS — M48.062 SPINAL STENOSIS OF LUMBAR REGION WITH NEUROGENIC CLAUDICATION: ICD-10-CM

## 2019-09-05 DIAGNOSIS — M41.9 SCOLIOSIS OF THORACOLUMBAR SPINE, UNSPECIFIED SCOLIOSIS TYPE: ICD-10-CM

## 2019-10-18 ENCOUNTER — COMMUNICATION - HEALTHEAST (OUTPATIENT)
Dept: CARDIOLOGY | Facility: CLINIC | Age: 84
End: 2019-10-18

## 2019-10-18 ENCOUNTER — COMMUNICATION - HEALTHEAST (OUTPATIENT)
Dept: INTERNAL MEDICINE | Facility: CLINIC | Age: 84
End: 2019-10-18

## 2019-10-18 DIAGNOSIS — F03.90 DEMENTIA WITHOUT BEHAVIORAL DISTURBANCE, UNSPECIFIED DEMENTIA TYPE: ICD-10-CM

## 2019-10-18 DIAGNOSIS — I42.8 NONISCHEMIC CARDIOMYOPATHY (H): ICD-10-CM

## 2019-10-21 ENCOUNTER — AMBULATORY - HEALTHEAST (OUTPATIENT)
Dept: INTERNAL MEDICINE | Facility: CLINIC | Age: 84
End: 2019-10-21

## 2019-10-21 DIAGNOSIS — I42.8 NONISCHEMIC CARDIOMYOPATHY (H): ICD-10-CM

## 2019-11-21 ENCOUNTER — COMMUNICATION - HEALTHEAST (OUTPATIENT)
Dept: INTERNAL MEDICINE | Facility: CLINIC | Age: 84
End: 2019-11-21

## 2019-11-21 DIAGNOSIS — E03.9 HYPOTHYROIDISM, UNSPECIFIED TYPE: ICD-10-CM

## 2019-12-30 ENCOUNTER — OFFICE VISIT - HEALTHEAST (OUTPATIENT)
Dept: INTERNAL MEDICINE | Facility: CLINIC | Age: 84
End: 2019-12-30

## 2019-12-30 DIAGNOSIS — D64.9 CHRONIC ANEMIA: ICD-10-CM

## 2019-12-30 DIAGNOSIS — F03.90 DEMENTIA WITHOUT BEHAVIORAL DISTURBANCE, UNSPECIFIED DEMENTIA TYPE: ICD-10-CM

## 2019-12-30 DIAGNOSIS — I47.29 NSVT (NONSUSTAINED VENTRICULAR TACHYCARDIA) (H): ICD-10-CM

## 2019-12-30 DIAGNOSIS — E03.9 HYPOTHYROIDISM, UNSPECIFIED TYPE: ICD-10-CM

## 2019-12-30 DIAGNOSIS — Z00.00 MEDICARE ANNUAL WELLNESS VISIT, SUBSEQUENT: ICD-10-CM

## 2019-12-30 DIAGNOSIS — I42.8 NONISCHEMIC CARDIOMYOPATHY (H): ICD-10-CM

## 2019-12-30 DIAGNOSIS — M79.604 LOW BACK PAIN RADIATING TO BOTH LEGS: ICD-10-CM

## 2019-12-30 DIAGNOSIS — R53.82 CHRONIC FATIGUE: ICD-10-CM

## 2019-12-30 DIAGNOSIS — M79.605 LOW BACK PAIN RADIATING TO BOTH LEGS: ICD-10-CM

## 2019-12-30 DIAGNOSIS — Z85.828 HISTORY OF SKIN CANCER: ICD-10-CM

## 2019-12-30 DIAGNOSIS — M81.0 AGE-RELATED OSTEOPOROSIS WITHOUT CURRENT PATHOLOGICAL FRACTURE: ICD-10-CM

## 2019-12-30 DIAGNOSIS — I10 ESSENTIAL HYPERTENSION: ICD-10-CM

## 2019-12-30 DIAGNOSIS — E78.5 HYPERLIPIDEMIA, UNSPECIFIED HYPERLIPIDEMIA TYPE: ICD-10-CM

## 2019-12-30 DIAGNOSIS — M54.50 LOW BACK PAIN RADIATING TO BOTH LEGS: ICD-10-CM

## 2019-12-30 DIAGNOSIS — K21.9 GASTROESOPHAGEAL REFLUX DISEASE WITHOUT ESOPHAGITIS: ICD-10-CM

## 2019-12-30 DIAGNOSIS — E55.9 VITAMIN D DEFICIENCY: ICD-10-CM

## 2019-12-30 LAB
ALBUMIN SERPL-MCNC: 3.5 G/DL (ref 3.5–5)
ALBUMIN UR-MCNC: NEGATIVE MG/DL
ALP SERPL-CCNC: 74 U/L (ref 45–120)
ALT SERPL W P-5'-P-CCNC: 16 U/L (ref 0–45)
ANION GAP SERPL CALCULATED.3IONS-SCNC: 11 MMOL/L (ref 5–18)
APPEARANCE UR: CLEAR
AST SERPL W P-5'-P-CCNC: 22 U/L (ref 0–40)
BACTERIA #/AREA URNS HPF: ABNORMAL HPF
BILIRUB SERPL-MCNC: 0.5 MG/DL (ref 0–1)
BILIRUB UR QL STRIP: NEGATIVE
BUN SERPL-MCNC: 24 MG/DL (ref 8–28)
CALCIUM SERPL-MCNC: 9.2 MG/DL (ref 8.5–10.5)
CHLORIDE BLD-SCNC: 101 MMOL/L (ref 98–107)
CHOLEST SERPL-MCNC: 272 MG/DL
CO2 SERPL-SCNC: 24 MMOL/L (ref 22–31)
COLOR UR AUTO: YELLOW
CREAT SERPL-MCNC: 0.96 MG/DL (ref 0.6–1.1)
ERYTHROCYTE [DISTWIDTH] IN BLOOD BY AUTOMATED COUNT: 11.2 % (ref 11–14.5)
FASTING STATUS PATIENT QL REPORTED: ABNORMAL
GFR SERPL CREATININE-BSD FRML MDRD: 55 ML/MIN/1.73M2
GLUCOSE BLD-MCNC: 86 MG/DL (ref 70–125)
GLUCOSE UR STRIP-MCNC: NEGATIVE MG/DL
HCT VFR BLD AUTO: 36.1 % (ref 35–47)
HDLC SERPL-MCNC: 56 MG/DL
HGB BLD-MCNC: 12.5 G/DL (ref 12–16)
HGB UR QL STRIP: NEGATIVE
HYALINE CASTS #/AREA URNS LPF: ABNORMAL LPF
KETONES UR STRIP-MCNC: NEGATIVE MG/DL
LDLC SERPL CALC-MCNC: 179 MG/DL
LEUKOCYTE ESTERASE UR QL STRIP: ABNORMAL
MCH RBC QN AUTO: 33.4 PG (ref 27–34)
MCHC RBC AUTO-ENTMCNC: 34.5 G/DL (ref 32–36)
MCV RBC AUTO: 97 FL (ref 80–100)
NITRATE UR QL: NEGATIVE
PH UR STRIP: 5.5 [PH] (ref 4.5–8)
PLATELET # BLD AUTO: 299 THOU/UL (ref 140–440)
PMV BLD AUTO: 6.9 FL (ref 7–10)
POTASSIUM BLD-SCNC: 4.5 MMOL/L (ref 3.5–5)
PROT SERPL-MCNC: 6.5 G/DL (ref 6–8)
RBC # BLD AUTO: 3.73 MILL/UL (ref 3.8–5.4)
RBC #/AREA URNS AUTO: ABNORMAL HPF
SODIUM SERPL-SCNC: 136 MMOL/L (ref 136–145)
SP GR UR STRIP: 1.01 (ref 1–1.03)
SQUAMOUS #/AREA URNS AUTO: ABNORMAL LPF
TRIGL SERPL-MCNC: 183 MG/DL
TSH SERPL DL<=0.005 MIU/L-ACNC: 0.93 UIU/ML (ref 0.3–5)
UROBILINOGEN UR STRIP-ACNC: ABNORMAL
WBC #/AREA URNS AUTO: ABNORMAL HPF
WBC: 7.3 THOU/UL (ref 4–11)

## 2019-12-30 ASSESSMENT — MIFFLIN-ST. JEOR: SCORE: 978.39

## 2019-12-31 ENCOUNTER — COMMUNICATION - HEALTHEAST (OUTPATIENT)
Dept: INTERNAL MEDICINE | Facility: CLINIC | Age: 84
End: 2019-12-31

## 2019-12-31 LAB
25(OH)D3 SERPL-MCNC: 63.9 NG/ML (ref 30–80)
BACTERIA SPEC CULT: NO GROWTH

## 2020-01-07 ENCOUNTER — COMMUNICATION - HEALTHEAST (OUTPATIENT)
Dept: SCHEDULING | Facility: CLINIC | Age: 85
End: 2020-01-07

## 2020-01-09 ENCOUNTER — OFFICE VISIT - HEALTHEAST (OUTPATIENT)
Dept: INTERNAL MEDICINE | Facility: CLINIC | Age: 85
End: 2020-01-09

## 2020-01-09 DIAGNOSIS — R55 NEAR SYNCOPE: ICD-10-CM

## 2020-01-09 DIAGNOSIS — I10 ESSENTIAL HYPERTENSION: ICD-10-CM

## 2020-01-09 LAB
ATRIAL RATE - MUSE: 64 BPM
DIASTOLIC BLOOD PRESSURE - MUSE: NORMAL
INTERPRETATION ECG - MUSE: NORMAL
P AXIS - MUSE: 38 DEGREES
PR INTERVAL - MUSE: 218 MS
QRS DURATION - MUSE: 130 MS
QT - MUSE: 452 MS
QTC - MUSE: 466 MS
R AXIS - MUSE: -44 DEGREES
SYSTOLIC BLOOD PRESSURE - MUSE: NORMAL
T AXIS - MUSE: 47 DEGREES
VENTRICULAR RATE- MUSE: 64 BPM

## 2020-01-09 ASSESSMENT — MIFFLIN-ST. JEOR: SCORE: 978.39

## 2020-01-13 ENCOUNTER — AMBULATORY - HEALTHEAST (OUTPATIENT)
Dept: INTERNAL MEDICINE | Facility: CLINIC | Age: 85
End: 2020-01-13

## 2020-01-13 ENCOUNTER — COMMUNICATION - HEALTHEAST (OUTPATIENT)
Dept: INTERNAL MEDICINE | Facility: CLINIC | Age: 85
End: 2020-01-13

## 2020-01-13 DIAGNOSIS — R06.09 DYSPNEA ON EXERTION: ICD-10-CM

## 2020-01-17 ENCOUNTER — HOSPITAL ENCOUNTER (OUTPATIENT)
Dept: NUCLEAR MEDICINE | Facility: HOSPITAL | Age: 85
Discharge: HOME | End: 2020-01-17
Attending: INTERNAL MEDICINE

## 2020-01-17 ENCOUNTER — HOSPITAL ENCOUNTER (OUTPATIENT)
Dept: CARDIOLOGY | Facility: HOSPITAL | Age: 85
Discharge: HOME | End: 2020-01-17
Attending: INTERNAL MEDICINE

## 2020-01-17 DIAGNOSIS — R06.09 OTHER FORMS OF DYSPNEA: ICD-10-CM

## 2020-01-17 DIAGNOSIS — R06.09 DYSPNEA ON EXERTION: ICD-10-CM

## 2020-01-17 LAB
CV STRESS CURRENT BP HE: NORMAL
CV STRESS CURRENT HR HE: 100
CV STRESS CURRENT HR HE: 101
CV STRESS CURRENT HR HE: 67
CV STRESS CURRENT HR HE: 70
CV STRESS CURRENT HR HE: 71
CV STRESS CURRENT HR HE: 74
CV STRESS CURRENT HR HE: 92
CV STRESS CURRENT HR HE: 93
CV STRESS CURRENT HR HE: 93
CV STRESS CURRENT HR HE: 95
CV STRESS CURRENT HR HE: 97
CV STRESS CURRENT HR HE: 98
CV STRESS CURRENT HR HE: 99
CV STRESS DEVIATION TIME HE: NORMAL
CV STRESS ECHO PERCENT HR HE: NORMAL
CV STRESS EXERCISE STAGE HE: NORMAL
CV STRESS FINAL RESTING BP HE: NORMAL
CV STRESS FINAL RESTING HR HE: 93
CV STRESS MAX HR HE: 103
CV STRESS MAX TREADMILL GRADE HE: 0
CV STRESS MAX TREADMILL SPEED HE: 0
CV STRESS PEAK DIA BP HE: NORMAL
CV STRESS PEAK SYS BP HE: NORMAL
CV STRESS PHASE HE: NORMAL
CV STRESS PROTOCOL HE: NORMAL
CV STRESS RESTING PT POSITION HE: NORMAL
CV STRESS ST DEVIATION AMOUNT HE: NORMAL
CV STRESS ST DEVIATION ELEVATION HE: NORMAL
CV STRESS ST EVELATION AMOUNT HE: NORMAL
CV STRESS TEST TYPE HE: NORMAL
CV STRESS TOTAL STAGE TIME MIN 1 HE: NORMAL
NUC STRESS EJECTION FRACTION: 38 %
RATE PRESSURE PRODUCT: NORMAL
STRESS ECHO BASELINE DIASTOLIC HE: 109
STRESS ECHO BASELINE HR: 74
STRESS ECHO BASELINE SYSTOLIC BP: 154
STRESS ECHO CALCULATED PERCENT HR: 76 %
STRESS ECHO LAST STRESS DIASTOLIC BP: 76
STRESS ECHO LAST STRESS HR: 99
STRESS ECHO LAST STRESS SYSTOLIC BP: 158
STRESS ECHO TARGET HR: 136

## 2020-01-20 ENCOUNTER — COMMUNICATION - HEALTHEAST (OUTPATIENT)
Dept: INTERNAL MEDICINE | Facility: CLINIC | Age: 85
End: 2020-01-20

## 2020-01-20 ENCOUNTER — AMBULATORY - HEALTHEAST (OUTPATIENT)
Dept: INTERNAL MEDICINE | Facility: CLINIC | Age: 85
End: 2020-01-20

## 2020-01-20 DIAGNOSIS — R94.39 ABNORMAL STRESS TEST: ICD-10-CM

## 2020-01-24 ENCOUNTER — OFFICE VISIT - HEALTHEAST (OUTPATIENT)
Dept: CARDIOLOGY | Facility: CLINIC | Age: 85
End: 2020-01-24

## 2020-01-24 DIAGNOSIS — I42.9 CARDIOMYOPATHY (H): ICD-10-CM

## 2020-01-24 ASSESSMENT — MIFFLIN-ST. JEOR: SCORE: 982.92

## 2020-01-25 ENCOUNTER — COMMUNICATION - HEALTHEAST (OUTPATIENT)
Dept: INTERNAL MEDICINE | Facility: CLINIC | Age: 85
End: 2020-01-25

## 2020-01-25 DIAGNOSIS — R11.0 NAUSEA: ICD-10-CM

## 2020-01-27 ENCOUNTER — HOSPITAL ENCOUNTER (OUTPATIENT)
Dept: CARDIOLOGY | Facility: CLINIC | Age: 85
Discharge: HOME | End: 2020-01-27
Attending: INTERNAL MEDICINE

## 2020-01-27 DIAGNOSIS — I42.9 CARDIOMYOPATHY (H): ICD-10-CM

## 2020-01-27 LAB
AORTIC ROOT: 2.9 CM
AORTIC VALVE MEAN VELOCITY: 94.5 CM/S
ASCENDING AORTA: 3.3 CM
AV DIMENSIONLESS INDEX VTI: 0.4
AV MEAN GRADIENT: 4 MMHG
AV PEAK GRADIENT: 7.4 MMHG
AV VALVE AREA: 1.2 CM2
AV VELOCITY RATIO: 0.4
BSA FOR ECHO PROCEDURE: 1.59 M2
CV BLOOD PRESSURE: ABNORMAL MMHG
CV ECHO HEIGHT: 64 IN
CV ECHO WEIGHT: 123 LBS
DOP CALC AO PEAK VEL: 136 CM/S
DOP CALC AO VTI: 30.2 CM
DOP CALC LVOT AREA: 3.14 CM2
DOP CALC LVOT DIAMETER: 2 CM
DOP CALC LVOT PEAK VEL: 55 CM/S
DOP CALC LVOT STROKE VOLUME: 35.8 CM3
DOP CALC MV VTI: 27.3 CM
DOP CALCLVOT PEAK VEL VTI: 11.4 CM
EJECTION FRACTION: 30 % (ref 55–75)
FRACTIONAL SHORTENING: 21.1 % (ref 28–44)
INTERVENTRICULAR SEPTUM IN END DIASTOLE: 1.07 CM (ref 0.6–0.9)
IVS/PW RATIO: 1.1
LA AREA 1: 21 CM2
LA AREA 2: 27 CM2
LEFT ATRIUM LENGTH: 5.4 CM
LEFT ATRIUM SIZE: 2.8 CM
LEFT ATRIUM TO AORTIC ROOT RATIO: 0.97 NO UNITS
LEFT ATRIUM VOLUME INDEX: 56.1 ML/M2
LEFT ATRIUM VOLUME: 89.3 ML
LEFT VENTRICLE CARDIAC INDEX: 1.5 L/MIN/M2
LEFT VENTRICLE CARDIAC OUTPUT: 2.4 L/MIN
LEFT VENTRICLE DIASTOLIC VOLUME INDEX: 48.2 CM3/M2 (ref 29–61)
LEFT VENTRICLE DIASTOLIC VOLUME: 76.6 CM3 (ref 46–106)
LEFT VENTRICLE HEART RATE: 68 BPM
LEFT VENTRICLE MASS INDEX: 106.1 G/M2
LEFT VENTRICLE SYSTOLIC VOLUME INDEX: 33.6 CM3/M2 (ref 8–24)
LEFT VENTRICLE SYSTOLIC VOLUME: 53.5 CM3 (ref 14–42)
LEFT VENTRICULAR INTERNAL DIMENSION IN DIASTOLE: 4.64 CM (ref 3.8–5.2)
LEFT VENTRICULAR INTERNAL DIMENSION IN SYSTOLE: 3.66 CM (ref 2.2–3.5)
LEFT VENTRICULAR MASS: 168.7 G
LEFT VENTRICULAR OUTFLOW TRACT MEAN GRADIENT: 0 MMHG
LEFT VENTRICULAR OUTFLOW TRACT MEAN VELOCITY: 30.9 CM/S
LEFT VENTRICULAR OUTFLOW TRACT PEAK GRADIENT: 1 MMHG
LEFT VENTRICULAR POSTERIOR WALL IN END DIASTOLE: 1 CM (ref 0.6–0.9)
LV STROKE VOLUME INDEX: 22.5 ML/M2
MITRAL VALVE DECELERATION SLOPE: 3070 MM/S2
MITRAL VALVE E/A RATIO: 0.6
MITRAL VALVE MEAN INFLOW VELOCITY: 61.7 CM/S
MITRAL VALVE PEAK VELOCITY: 102 CM/S
MITRAL VALVE PRESSURE HALF-TIME: 59 MS
MV AREA VTI: 1.31 CM2
MV AVERAGE E/E' RATIO: 21.4 CM/S
MV DECELERATION TIME: 201 MS
MV E'TISSUE VEL-LAT: 3.41 CM/S
MV E'TISSUE VEL-MED: 2.35 CM/S
MV LATERAL E/E' RATIO: 18.1
MV MEAN GRADIENT: 2 MMHG
MV MEDIAL E/E' RATIO: 26.3
MV PEAK A VELOCITY: 107 CM/S
MV PEAK E VELOCITY: 61.7 CM/S
MV PEAK GRADIENT: 4.2 MMHG
MV VALVE AREA BY CONTINUITY EQUATION: 1.3 CM2
MV VALVE AREA PRESSURE 1/2 METHOD: 3.7 CM2
NUC REST DIASTOLIC VOLUME INDEX: 1968 LBS
NUC REST SYSTOLIC VOLUME INDEX: 64 IN
TRICUSPID REGURGITATION PEAK PRESSURE GRADIENT: 29.4 MMHG
TRICUSPID VALVE ANULAR PLANE SYSTOLIC EXCURSION: 2.2 CM
TRICUSPID VALVE PEAK REGURGITANT VELOCITY: 271 CM/S

## 2020-01-27 ASSESSMENT — MIFFLIN-ST. JEOR: SCORE: 982.92

## 2020-01-28 ENCOUNTER — COMMUNICATION - HEALTHEAST (OUTPATIENT)
Dept: INTERNAL MEDICINE | Facility: CLINIC | Age: 85
End: 2020-01-28

## 2020-01-29 ENCOUNTER — COMMUNICATION - HEALTHEAST (OUTPATIENT)
Dept: CARDIOLOGY | Facility: CLINIC | Age: 85
End: 2020-01-29

## 2020-01-30 ENCOUNTER — COMMUNICATION - HEALTHEAST (OUTPATIENT)
Dept: CARDIOLOGY | Facility: CLINIC | Age: 85
End: 2020-01-30

## 2020-01-30 DIAGNOSIS — I51.9 DECREASED LEFT VENTRICULAR FUNCTION: ICD-10-CM

## 2020-01-30 DIAGNOSIS — E78.5 HYPERLIPIDEMIA: ICD-10-CM

## 2020-01-30 DIAGNOSIS — I10 ESSENTIAL HYPERTENSION: ICD-10-CM

## 2020-01-30 DIAGNOSIS — R94.39 ABNORMAL CARDIOVASCULAR STRESS TEST: ICD-10-CM

## 2020-01-30 DIAGNOSIS — R55 NEAR SYNCOPE: ICD-10-CM

## 2020-02-11 ENCOUNTER — COMMUNICATION - HEALTHEAST (OUTPATIENT)
Dept: CARDIOLOGY | Facility: CLINIC | Age: 85
End: 2020-02-11

## 2020-02-11 ENCOUNTER — SURGERY - HEALTHEAST (OUTPATIENT)
Dept: CARDIOLOGY | Facility: CLINIC | Age: 85
End: 2020-02-11

## 2020-02-12 ENCOUNTER — AMBULATORY - HEALTHEAST (OUTPATIENT)
Dept: OTHER | Facility: CLINIC | Age: 85
End: 2020-02-12

## 2020-02-13 ENCOUNTER — SURGERY - HEALTHEAST (OUTPATIENT)
Dept: CARDIOLOGY | Facility: CLINIC | Age: 85
End: 2020-02-13

## 2020-02-13 ASSESSMENT — MIFFLIN-ST. JEOR: SCORE: 969.32

## 2020-02-14 ENCOUNTER — COMMUNICATION - HEALTHEAST (OUTPATIENT)
Dept: CARDIOLOGY | Facility: CLINIC | Age: 85
End: 2020-02-14

## 2020-02-15 ENCOUNTER — HOME CARE/HOSPICE - HEALTHEAST (OUTPATIENT)
Dept: HOME HEALTH SERVICES | Facility: HOME HEALTH | Age: 85
End: 2020-02-15

## 2020-02-17 ENCOUNTER — COMMUNICATION - HEALTHEAST (OUTPATIENT)
Dept: INTERNAL MEDICINE | Facility: CLINIC | Age: 85
End: 2020-02-17

## 2020-02-18 ENCOUNTER — COMMUNICATION - HEALTHEAST (OUTPATIENT)
Dept: INTERNAL MEDICINE | Facility: CLINIC | Age: 85
End: 2020-02-18

## 2020-02-18 ENCOUNTER — AMBULATORY - HEALTHEAST (OUTPATIENT)
Dept: PHARMACY | Facility: CLINIC | Age: 85
End: 2020-02-18

## 2020-02-18 DIAGNOSIS — Z78.9 TAKES DIETARY SUPPLEMENTS: ICD-10-CM

## 2020-02-18 DIAGNOSIS — G89.29 CHRONIC BILATERAL LOW BACK PAIN WITH SCIATICA, SCIATICA LATERALITY UNSPECIFIED: ICD-10-CM

## 2020-02-18 DIAGNOSIS — M54.40 CHRONIC BILATERAL LOW BACK PAIN WITH SCIATICA, SCIATICA LATERALITY UNSPECIFIED: ICD-10-CM

## 2020-02-18 DIAGNOSIS — E03.4 HYPOTHYROIDISM DUE TO ACQUIRED ATROPHY OF THYROID: ICD-10-CM

## 2020-02-18 DIAGNOSIS — D64.9 CHRONIC ANEMIA: ICD-10-CM

## 2020-02-18 DIAGNOSIS — F02.80 LATE ONSET ALZHEIMER'S DISEASE WITHOUT BEHAVIORAL DISTURBANCE (H): ICD-10-CM

## 2020-02-18 DIAGNOSIS — I42.8 CARDIOMYOPATHY, NONISCHEMIC (H): ICD-10-CM

## 2020-02-18 DIAGNOSIS — G25.81 RLS (RESTLESS LEGS SYNDROME): ICD-10-CM

## 2020-02-18 DIAGNOSIS — G30.1 LATE ONSET ALZHEIMER'S DISEASE WITHOUT BEHAVIORAL DISTURBANCE (H): ICD-10-CM

## 2020-02-18 DIAGNOSIS — K21.9 GASTROESOPHAGEAL REFLUX DISEASE WITHOUT ESOPHAGITIS: ICD-10-CM

## 2020-02-18 DIAGNOSIS — I63.9 ACUTE CVA (CEREBROVASCULAR ACCIDENT) (H): ICD-10-CM

## 2020-02-18 DIAGNOSIS — M81.0 AGE-RELATED OSTEOPOROSIS WITHOUT CURRENT PATHOLOGICAL FRACTURE: ICD-10-CM

## 2020-02-19 ENCOUNTER — COMMUNICATION - HEALTHEAST (OUTPATIENT)
Dept: CARDIOLOGY | Facility: CLINIC | Age: 85
End: 2020-02-19

## 2020-02-21 ENCOUNTER — COMMUNICATION - HEALTHEAST (OUTPATIENT)
Dept: INTERNAL MEDICINE | Facility: CLINIC | Age: 85
End: 2020-02-21

## 2020-02-25 ENCOUNTER — AMBULATORY - HEALTHEAST (OUTPATIENT)
Dept: INTERNAL MEDICINE | Facility: CLINIC | Age: 85
End: 2020-02-25

## 2020-02-25 DIAGNOSIS — M81.0 AGE-RELATED OSTEOPOROSIS WITHOUT CURRENT PATHOLOGICAL FRACTURE: ICD-10-CM

## 2020-02-25 DIAGNOSIS — Z92.29 PERSONAL HISTORY OF OTHER DRUG THERAPY: ICD-10-CM

## 2020-02-27 ENCOUNTER — OFFICE VISIT - HEALTHEAST (OUTPATIENT)
Dept: INTERNAL MEDICINE | Facility: CLINIC | Age: 85
End: 2020-02-27

## 2020-02-27 DIAGNOSIS — M79.671 RIGHT FOOT PAIN: ICD-10-CM

## 2020-02-27 DIAGNOSIS — M54.42 CHRONIC BILATERAL LOW BACK PAIN WITH BILATERAL SCIATICA: ICD-10-CM

## 2020-02-27 DIAGNOSIS — F51.04 CHRONIC INSOMNIA: ICD-10-CM

## 2020-02-27 DIAGNOSIS — I42.8 CARDIOMYOPATHY, NONISCHEMIC (H): ICD-10-CM

## 2020-02-27 DIAGNOSIS — F02.80 LATE ONSET ALZHEIMER'S DISEASE WITHOUT BEHAVIORAL DISTURBANCE (H): ICD-10-CM

## 2020-02-27 DIAGNOSIS — M54.41 CHRONIC BILATERAL LOW BACK PAIN WITH BILATERAL SCIATICA: ICD-10-CM

## 2020-02-27 DIAGNOSIS — G89.29 CHRONIC BILATERAL LOW BACK PAIN WITH BILATERAL SCIATICA: ICD-10-CM

## 2020-02-27 DIAGNOSIS — I63.9 ACUTE CVA (CEREBROVASCULAR ACCIDENT) (H): ICD-10-CM

## 2020-02-27 DIAGNOSIS — G30.1 LATE ONSET ALZHEIMER'S DISEASE WITHOUT BEHAVIORAL DISTURBANCE (H): ICD-10-CM

## 2020-02-27 DIAGNOSIS — I10 ESSENTIAL HYPERTENSION: ICD-10-CM

## 2020-02-27 DIAGNOSIS — Z51.81 ENCOUNTER FOR THERAPEUTIC DRUG MONITORING: ICD-10-CM

## 2020-02-27 DIAGNOSIS — M81.0 AGE-RELATED OSTEOPOROSIS WITHOUT CURRENT PATHOLOGICAL FRACTURE: ICD-10-CM

## 2020-02-27 LAB
ANION GAP SERPL CALCULATED.3IONS-SCNC: 12 MMOL/L (ref 5–18)
BUN SERPL-MCNC: 29 MG/DL (ref 8–28)
CALCIUM SERPL-MCNC: 9.5 MG/DL (ref 8.5–10.5)
CHLORIDE BLD-SCNC: 98 MMOL/L (ref 98–107)
CO2 SERPL-SCNC: 25 MMOL/L (ref 22–31)
CREAT SERPL-MCNC: 1.18 MG/DL (ref 0.6–1.1)
GFR SERPL CREATININE-BSD FRML MDRD: 44 ML/MIN/1.73M2
GLUCOSE BLD-MCNC: 86 MG/DL (ref 70–125)
POTASSIUM BLD-SCNC: 5 MMOL/L (ref 3.5–5)
SODIUM SERPL-SCNC: 135 MMOL/L (ref 136–145)

## 2020-02-27 ASSESSMENT — MIFFLIN-ST. JEOR: SCORE: 982.92

## 2020-02-28 ENCOUNTER — COMMUNICATION - HEALTHEAST (OUTPATIENT)
Dept: INTERNAL MEDICINE | Facility: CLINIC | Age: 85
End: 2020-02-28

## 2020-03-02 ENCOUNTER — HOSPITAL ENCOUNTER (OUTPATIENT)
Dept: CARDIOLOGY | Facility: HOSPITAL | Age: 85
Discharge: HOME | End: 2020-03-02
Attending: INTERNAL MEDICINE

## 2020-03-02 DIAGNOSIS — I42.8 CARDIOMYOPATHY, NONISCHEMIC (H): ICD-10-CM

## 2020-03-05 ENCOUNTER — COMMUNICATION - HEALTHEAST (OUTPATIENT)
Dept: LAB | Facility: CLINIC | Age: 85
End: 2020-03-05

## 2020-03-05 ENCOUNTER — COMMUNICATION - HEALTHEAST (OUTPATIENT)
Dept: CARDIOLOGY | Facility: CLINIC | Age: 85
End: 2020-03-05

## 2020-03-06 ENCOUNTER — RECORDS - HEALTHEAST (OUTPATIENT)
Dept: ADMINISTRATIVE | Facility: OTHER | Age: 85
End: 2020-03-06

## 2020-03-06 ENCOUNTER — COMMUNICATION - HEALTHEAST (OUTPATIENT)
Dept: GENERAL RADIOLOGY | Facility: CLINIC | Age: 85
End: 2020-03-06

## 2020-03-06 ENCOUNTER — COMMUNICATION - HEALTHEAST (OUTPATIENT)
Dept: INTERNAL MEDICINE | Facility: CLINIC | Age: 85
End: 2020-03-06

## 2020-03-10 ENCOUNTER — AMBULATORY - HEALTHEAST (OUTPATIENT)
Dept: INTERNAL MEDICINE | Facility: CLINIC | Age: 85
End: 2020-03-10

## 2020-03-10 ENCOUNTER — AMBULATORY - HEALTHEAST (OUTPATIENT)
Dept: NURSING | Facility: CLINIC | Age: 85
End: 2020-03-10

## 2020-03-10 DIAGNOSIS — I10 ESSENTIAL HYPERTENSION: ICD-10-CM

## 2020-03-10 DIAGNOSIS — E78.5 HYPERLIPIDEMIA: ICD-10-CM

## 2020-03-12 ENCOUNTER — OFFICE VISIT - HEALTHEAST (OUTPATIENT)
Dept: CARDIOLOGY | Facility: CLINIC | Age: 85
End: 2020-03-12

## 2020-03-12 ENCOUNTER — RECORDS - HEALTHEAST (OUTPATIENT)
Dept: ADMINISTRATIVE | Facility: OTHER | Age: 85
End: 2020-03-12

## 2020-03-12 DIAGNOSIS — I42.8 CARDIOMYOPATHY, NONISCHEMIC (H): ICD-10-CM

## 2020-03-12 DIAGNOSIS — I10 ESSENTIAL HYPERTENSION: ICD-10-CM

## 2020-03-12 DIAGNOSIS — I51.9 DECREASED LEFT VENTRICULAR FUNCTION: ICD-10-CM

## 2020-03-12 RX ORDER — ASPIRIN 325 MG
325 TABLET, DELAYED RELEASE (ENTERIC COATED) ORAL DAILY
Status: SHIPPED | COMMUNITY
Start: 2020-03-12

## 2020-03-12 ASSESSMENT — MIFFLIN-ST. JEOR: SCORE: 992

## 2020-03-16 ENCOUNTER — RECORDS - HEALTHEAST (OUTPATIENT)
Dept: ADMINISTRATIVE | Facility: OTHER | Age: 85
End: 2020-03-16

## 2020-04-02 ENCOUNTER — RECORDS - HEALTHEAST (OUTPATIENT)
Dept: ADMINISTRATIVE | Facility: OTHER | Age: 85
End: 2020-04-02

## 2020-04-13 ENCOUNTER — RECORDS - HEALTHEAST (OUTPATIENT)
Dept: ADMINISTRATIVE | Facility: OTHER | Age: 85
End: 2020-04-13

## 2020-04-27 ENCOUNTER — COMMUNICATION - HEALTHEAST (OUTPATIENT)
Dept: INTERNAL MEDICINE | Facility: CLINIC | Age: 85
End: 2020-04-27

## 2020-04-27 DIAGNOSIS — M54.9 CHRONIC BILATERAL BACK PAIN, UNSPECIFIED BACK LOCATION: ICD-10-CM

## 2020-04-27 DIAGNOSIS — G89.29 CHRONIC BILATERAL BACK PAIN, UNSPECIFIED BACK LOCATION: ICD-10-CM

## 2020-04-28 ENCOUNTER — COMMUNICATION - HEALTHEAST (OUTPATIENT)
Dept: INTERNAL MEDICINE | Facility: CLINIC | Age: 85
End: 2020-04-28

## 2020-04-28 DIAGNOSIS — F03.90 DEMENTIA WITHOUT BEHAVIORAL DISTURBANCE, UNSPECIFIED DEMENTIA TYPE: ICD-10-CM

## 2020-05-01 ENCOUNTER — COMMUNICATION - HEALTHEAST (OUTPATIENT)
Dept: INTERNAL MEDICINE | Facility: CLINIC | Age: 85
End: 2020-05-01

## 2020-05-01 DIAGNOSIS — G89.29 CHRONIC BILATERAL LOW BACK PAIN WITH BILATERAL SCIATICA: ICD-10-CM

## 2020-05-01 DIAGNOSIS — M54.41 CHRONIC BILATERAL LOW BACK PAIN WITH BILATERAL SCIATICA: ICD-10-CM

## 2020-05-01 DIAGNOSIS — M54.42 CHRONIC BILATERAL LOW BACK PAIN WITH BILATERAL SCIATICA: ICD-10-CM

## 2020-05-04 ENCOUNTER — AMBULATORY - HEALTHEAST (OUTPATIENT)
Dept: INTERNAL MEDICINE | Facility: CLINIC | Age: 85
End: 2020-05-04

## 2020-05-04 ENCOUNTER — OFFICE VISIT - HEALTHEAST (OUTPATIENT)
Dept: INTERNAL MEDICINE | Facility: CLINIC | Age: 85
End: 2020-05-04

## 2020-05-04 DIAGNOSIS — I42.8 CARDIOMYOPATHY, NONISCHEMIC (H): ICD-10-CM

## 2020-05-04 DIAGNOSIS — G89.29 CHRONIC BILATERAL LOW BACK PAIN WITHOUT SCIATICA: ICD-10-CM

## 2020-05-04 DIAGNOSIS — F51.04 CHRONIC INSOMNIA: ICD-10-CM

## 2020-05-04 DIAGNOSIS — I10 ESSENTIAL HYPERTENSION: ICD-10-CM

## 2020-05-04 DIAGNOSIS — M81.0 AGE-RELATED OSTEOPOROSIS WITHOUT CURRENT PATHOLOGICAL FRACTURE: ICD-10-CM

## 2020-05-04 DIAGNOSIS — M54.50 CHRONIC BILATERAL LOW BACK PAIN WITHOUT SCIATICA: ICD-10-CM

## 2020-05-04 DIAGNOSIS — I63.9 ACUTE CVA (CEREBROVASCULAR ACCIDENT) (H): ICD-10-CM

## 2020-05-04 DIAGNOSIS — Z86.73 HISTORY OF CVA (CEREBROVASCULAR ACCIDENT): ICD-10-CM

## 2020-05-04 DIAGNOSIS — G30.1 LATE ONSET ALZHEIMER'S DISEASE WITHOUT BEHAVIORAL DISTURBANCE (H): ICD-10-CM

## 2020-05-04 DIAGNOSIS — F02.80 LATE ONSET ALZHEIMER'S DISEASE WITHOUT BEHAVIORAL DISTURBANCE (H): ICD-10-CM

## 2020-05-07 ENCOUNTER — OFFICE VISIT - HEALTHEAST (OUTPATIENT)
Dept: CARDIOLOGY | Facility: CLINIC | Age: 85
End: 2020-05-07

## 2020-05-07 DIAGNOSIS — I42.9 CARDIOMYOPATHY, UNSPECIFIED TYPE (H): ICD-10-CM

## 2020-05-29 ENCOUNTER — COMMUNICATION - HEALTHEAST (OUTPATIENT)
Dept: INTERNAL MEDICINE | Facility: CLINIC | Age: 85
End: 2020-05-29

## 2020-05-29 DIAGNOSIS — G89.29 CHRONIC BILATERAL LOW BACK PAIN WITH BILATERAL SCIATICA: ICD-10-CM

## 2020-05-29 DIAGNOSIS — M54.41 CHRONIC BILATERAL LOW BACK PAIN WITH BILATERAL SCIATICA: ICD-10-CM

## 2020-05-29 DIAGNOSIS — M54.42 CHRONIC BILATERAL LOW BACK PAIN WITH BILATERAL SCIATICA: ICD-10-CM

## 2020-06-27 ENCOUNTER — COMMUNICATION - HEALTHEAST (OUTPATIENT)
Dept: INTERNAL MEDICINE | Facility: CLINIC | Age: 85
End: 2020-06-27

## 2020-06-27 DIAGNOSIS — E55.9 VITAMIN D DEFICIENCY: ICD-10-CM

## 2020-07-07 ENCOUNTER — OFFICE VISIT - HEALTHEAST (OUTPATIENT)
Dept: INTERNAL MEDICINE | Facility: CLINIC | Age: 85
End: 2020-07-07

## 2020-07-07 DIAGNOSIS — G89.29 CHRONIC BILATERAL LOW BACK PAIN WITH SCIATICA, SCIATICA LATERALITY UNSPECIFIED: ICD-10-CM

## 2020-07-07 DIAGNOSIS — R53.82 CHRONIC FATIGUE: ICD-10-CM

## 2020-07-07 DIAGNOSIS — M81.0 AGE-RELATED OSTEOPOROSIS WITHOUT CURRENT PATHOLOGICAL FRACTURE: ICD-10-CM

## 2020-07-07 DIAGNOSIS — E55.9 VITAMIN D DEFICIENCY: ICD-10-CM

## 2020-07-07 DIAGNOSIS — G30.1 LATE ONSET ALZHEIMER'S DISEASE WITHOUT BEHAVIORAL DISTURBANCE (H): ICD-10-CM

## 2020-07-07 DIAGNOSIS — I25.10 ASCVD (ARTERIOSCLEROTIC CARDIOVASCULAR DISEASE): ICD-10-CM

## 2020-07-07 DIAGNOSIS — E03.9 HYPOTHYROIDISM, UNSPECIFIED TYPE: ICD-10-CM

## 2020-07-07 DIAGNOSIS — F02.80 LATE ONSET ALZHEIMER'S DISEASE WITHOUT BEHAVIORAL DISTURBANCE (H): ICD-10-CM

## 2020-07-07 DIAGNOSIS — Z51.81 ENCOUNTER FOR THERAPEUTIC DRUG MONITORING: ICD-10-CM

## 2020-07-07 DIAGNOSIS — I10 ESSENTIAL HYPERTENSION: ICD-10-CM

## 2020-07-07 DIAGNOSIS — I42.8 CARDIOMYOPATHY, NONISCHEMIC (H): ICD-10-CM

## 2020-07-07 DIAGNOSIS — D64.9 CHRONIC ANEMIA: ICD-10-CM

## 2020-07-07 DIAGNOSIS — M54.40 CHRONIC BILATERAL LOW BACK PAIN WITH SCIATICA, SCIATICA LATERALITY UNSPECIFIED: ICD-10-CM

## 2020-07-07 DIAGNOSIS — E78.2 MIXED HYPERLIPIDEMIA: ICD-10-CM

## 2020-07-07 DIAGNOSIS — F51.04 CHRONIC INSOMNIA: ICD-10-CM

## 2020-07-07 LAB
ERYTHROCYTE [DISTWIDTH] IN BLOOD BY AUTOMATED COUNT: 12.2 % (ref 11–14.5)
HCT VFR BLD AUTO: 34 % (ref 35–47)
HGB BLD-MCNC: 11.6 G/DL (ref 12–16)
MCH RBC QN AUTO: 33.1 PG (ref 27–34)
MCHC RBC AUTO-ENTMCNC: 34.1 G/DL (ref 32–36)
MCV RBC AUTO: 97 FL (ref 80–100)
PLATELET # BLD AUTO: 213 THOU/UL (ref 140–440)
PMV BLD AUTO: 7.2 FL (ref 7–10)
RBC # BLD AUTO: 3.51 MILL/UL (ref 3.8–5.4)
WBC: 6.8 THOU/UL (ref 4–11)

## 2020-07-07 ASSESSMENT — MIFFLIN-ST. JEOR: SCORE: 973.85

## 2020-07-08 LAB
ALBUMIN SERPL-MCNC: 3.4 G/DL (ref 3.5–5)
ALP SERPL-CCNC: 50 U/L (ref 45–120)
ALT SERPL W P-5'-P-CCNC: 13 U/L (ref 0–45)
ANION GAP SERPL CALCULATED.3IONS-SCNC: 11 MMOL/L (ref 5–18)
AST SERPL W P-5'-P-CCNC: 19 U/L (ref 0–40)
BILIRUB SERPL-MCNC: 0.3 MG/DL (ref 0–1)
BUN SERPL-MCNC: 32 MG/DL (ref 8–28)
CALCIUM SERPL-MCNC: 9.1 MG/DL (ref 8.5–10.5)
CHLORIDE BLD-SCNC: 102 MMOL/L (ref 98–107)
CHOLEST SERPL-MCNC: 150 MG/DL
CK SERPL-CCNC: 129 U/L (ref 30–190)
CO2 SERPL-SCNC: 21 MMOL/L (ref 22–31)
CREAT SERPL-MCNC: 1.47 MG/DL (ref 0.6–1.1)
FASTING STATUS PATIENT QL REPORTED: YES
GFR SERPL CREATININE-BSD FRML MDRD: 34 ML/MIN/1.73M2
GLUCOSE BLD-MCNC: 77 MG/DL (ref 70–125)
HDLC SERPL-MCNC: 52 MG/DL
LDLC SERPL CALC-MCNC: 72 MG/DL
POTASSIUM BLD-SCNC: 5.6 MMOL/L (ref 3.5–5)
PROT SERPL-MCNC: 6.1 G/DL (ref 6–8)
SODIUM SERPL-SCNC: 134 MMOL/L (ref 136–145)
TRIGL SERPL-MCNC: 132 MG/DL
TSH SERPL DL<=0.005 MIU/L-ACNC: 0.28 UIU/ML (ref 0.3–5)

## 2020-07-09 ENCOUNTER — COMMUNICATION - HEALTHEAST (OUTPATIENT)
Dept: INTERNAL MEDICINE | Facility: CLINIC | Age: 85
End: 2020-07-09

## 2020-07-09 ENCOUNTER — AMBULATORY - HEALTHEAST (OUTPATIENT)
Dept: INTERNAL MEDICINE | Facility: CLINIC | Age: 85
End: 2020-07-09

## 2020-07-09 DIAGNOSIS — E03.9 HYPOTHYROIDISM, UNSPECIFIED TYPE: ICD-10-CM

## 2020-07-09 DIAGNOSIS — N17.9 ACUTE KIDNEY INJURY (H): ICD-10-CM

## 2020-07-16 ENCOUNTER — HOSPITAL ENCOUNTER (OUTPATIENT)
Dept: PHYSICAL MEDICINE AND REHAB | Facility: CLINIC | Age: 85
Discharge: HOME | End: 2020-07-16
Attending: PHYSICIAN ASSISTANT

## 2020-07-16 DIAGNOSIS — M41.9 SCOLIOSIS OF THORACOLUMBAR SPINE, UNSPECIFIED SCOLIOSIS TYPE: ICD-10-CM

## 2020-07-16 DIAGNOSIS — M48.062 SPINAL STENOSIS OF LUMBAR REGION WITH NEUROGENIC CLAUDICATION: ICD-10-CM

## 2020-07-16 ASSESSMENT — MIFFLIN-ST. JEOR: SCORE: 973.85

## 2020-07-22 ENCOUNTER — HOSPITAL ENCOUNTER (OUTPATIENT)
Dept: PHYSICAL MEDICINE AND REHAB | Facility: CLINIC | Age: 85
Discharge: HOME | End: 2020-07-22
Attending: PHYSICIAN ASSISTANT

## 2020-07-22 DIAGNOSIS — M48.062 SPINAL STENOSIS OF LUMBAR REGION WITH NEUROGENIC CLAUDICATION: ICD-10-CM

## 2020-07-25 ENCOUNTER — COMMUNICATION - HEALTHEAST (OUTPATIENT)
Dept: INTERNAL MEDICINE | Facility: CLINIC | Age: 85
End: 2020-07-25

## 2020-07-25 DIAGNOSIS — E03.9 HYPOTHYROIDISM, UNSPECIFIED TYPE: ICD-10-CM

## 2020-07-27 ENCOUNTER — COMMUNICATION - HEALTHEAST (OUTPATIENT)
Dept: INTERNAL MEDICINE | Facility: CLINIC | Age: 85
End: 2020-07-27

## 2020-07-27 DIAGNOSIS — R11.0 NAUSEA: ICD-10-CM

## 2020-08-17 ENCOUNTER — HOSPITAL ENCOUNTER (OUTPATIENT)
Dept: CARDIOLOGY | Facility: HOSPITAL | Age: 85
Discharge: HOME | End: 2020-08-17
Attending: INTERNAL MEDICINE

## 2020-08-17 ENCOUNTER — AMBULATORY - HEALTHEAST (OUTPATIENT)
Dept: LAB | Facility: CLINIC | Age: 85
End: 2020-08-17

## 2020-08-17 ENCOUNTER — AMBULATORY - HEALTHEAST (OUTPATIENT)
Dept: INTERNAL MEDICINE | Facility: CLINIC | Age: 85
End: 2020-08-17

## 2020-08-17 DIAGNOSIS — N17.9 ACUTE KIDNEY INJURY (H): ICD-10-CM

## 2020-08-17 DIAGNOSIS — I42.9 CARDIOMYOPATHY, UNSPECIFIED TYPE (H): ICD-10-CM

## 2020-08-17 DIAGNOSIS — E03.9 HYPOTHYROIDISM, UNSPECIFIED TYPE: ICD-10-CM

## 2020-08-17 LAB
ANION GAP SERPL CALCULATED.3IONS-SCNC: 10 MMOL/L (ref 5–18)
BUN SERPL-MCNC: 33 MG/DL (ref 8–28)
CALCIUM SERPL-MCNC: 8.8 MG/DL (ref 8.5–10.5)
CHLORIDE BLD-SCNC: 105 MMOL/L (ref 98–107)
CO2 SERPL-SCNC: 24 MMOL/L (ref 22–31)
CREAT SERPL-MCNC: 1.46 MG/DL (ref 0.6–1.1)
GFR SERPL CREATININE-BSD FRML MDRD: 34 ML/MIN/1.73M2
GLUCOSE BLD-MCNC: 136 MG/DL (ref 70–125)
POTASSIUM BLD-SCNC: 5 MMOL/L (ref 3.5–5)
SODIUM SERPL-SCNC: 139 MMOL/L (ref 136–145)
TSH SERPL DL<=0.005 MIU/L-ACNC: 0.2 UIU/ML (ref 0.3–5)

## 2020-08-17 ASSESSMENT — MIFFLIN-ST. JEOR
SCORE: 973.85
SCORE: 973.85

## 2020-08-18 LAB
AORTIC ROOT: 2.9 CM
AORTIC VALVE MEAN VELOCITY: 111 CM/S
ASCENDING AORTA: 3.3 CM
AV DIMENSIONLESS INDEX VTI: 0.6
AV MEAN GRADIENT: 5 MMHG
AV PEAK GRADIENT: 8.1 MMHG
AV VALVE AREA: 1.6 CM2
AV VELOCITY RATIO: 0.7
BSA FOR ECHO PROCEDURE: 1.57 M2
CV BLOOD PRESSURE: ABNORMAL MMHG
CV ECHO HEIGHT: 64 IN
CV ECHO WEIGHT: 121 LBS
DOP CALC AO PEAK VEL: 142 CM/S
DOP CALC AO VTI: 35.4 CM
DOP CALC LVOT AREA: 2.83 CM2
DOP CALC LVOT DIAMETER: 1.9 CM
DOP CALC LVOT PEAK VEL: 94.6 CM/S
DOP CALC LVOT STROKE VOLUME: 57.5 CM3
DOP CALCLVOT PEAK VEL VTI: 20.3 CM
EJECTION FRACTION: 45 % (ref 55–75)
FRACTIONAL SHORTENING: 13.3 % (ref 28–44)
INTERVENTRICULAR SEPTUM IN END DIASTOLE: 1.15 CM (ref 0.6–0.9)
IVS/PW RATIO: 1.1
LA AREA 1: 9.6 CM2
LA AREA 2: 19.1 CM2
LEFT ATRIUM LENGTH: 3.3 CM
LEFT ATRIUM SIZE: 2.6 CM
LEFT ATRIUM TO AORTIC ROOT RATIO: 0.9 NO UNITS
LEFT ATRIUM VOLUME INDEX: 30.1 ML/M2
LEFT ATRIUM VOLUME: 47.2 ML
LEFT VENTRICLE CARDIAC INDEX: 1.9 L/MIN/M2
LEFT VENTRICLE CARDIAC OUTPUT: 3 L/MIN
LEFT VENTRICLE DIASTOLIC VOLUME INDEX: 26.5 CM3/M2 (ref 29–61)
LEFT VENTRICLE DIASTOLIC VOLUME: 41.6 CM3 (ref 46–106)
LEFT VENTRICLE HEART RATE: 52 BPM
LEFT VENTRICLE MASS INDEX: 92.2 G/M2
LEFT VENTRICLE SYSTOLIC VOLUME INDEX: 14.5 CM3/M2 (ref 8–24)
LEFT VENTRICLE SYSTOLIC VOLUME: 22.7 CM3 (ref 14–42)
LEFT VENTRICULAR INTERNAL DIMENSION IN DIASTOLE: 4 CM (ref 3.8–5.2)
LEFT VENTRICULAR INTERNAL DIMENSION IN SYSTOLE: 3.47 CM (ref 2.2–3.5)
LEFT VENTRICULAR MASS: 144.7 G
LEFT VENTRICULAR OUTFLOW TRACT MEAN GRADIENT: 2 MMHG
LEFT VENTRICULAR OUTFLOW TRACT MEAN VELOCITY: 61.9 CM/S
LEFT VENTRICULAR OUTFLOW TRACT PEAK GRADIENT: 4 MMHG
LEFT VENTRICULAR POSTERIOR WALL IN END DIASTOLE: 1.04 CM (ref 0.6–0.9)
LV STROKE VOLUME INDEX: 36.6 ML/M2
MITRAL VALVE DECELERATION SLOPE: 2030 MM/S2
MITRAL VALVE E/A RATIO: 0.7
MITRAL VALVE PRESSURE HALF-TIME: 83 MS
MV AVERAGE E/E' RATIO: 12.3 CM/S
MV DECELERATION TIME: 282 MS
MV E'TISSUE VEL-LAT: 5.33 CM/S
MV E'TISSUE VEL-MED: 3.96 CM/S
MV LATERAL E/E' RATIO: 10.7
MV MEDIAL E/E' RATIO: 14.4
MV PEAK A VELOCITY: 80.5 CM/S
MV PEAK E VELOCITY: 57.2 CM/S
MV VALVE AREA PRESSURE 1/2 METHOD: 2.7 CM2
NUC REST DIASTOLIC VOLUME INDEX: 1936 LBS
NUC REST SYSTOLIC VOLUME INDEX: 64 IN
PR MAX PG: 5 MMHG
PR PEAK VELOCITY: 107 CM/S
TRICUSPID REGURGITATION PEAK PRESSURE GRADIENT: 19.4 MMHG
TRICUSPID VALVE ANULAR PLANE SYSTOLIC EXCURSION: 1.8 CM
TRICUSPID VALVE PEAK REGURGITANT VELOCITY: 220 CM/S

## 2020-10-10 ENCOUNTER — COMMUNICATION - HEALTHEAST (OUTPATIENT)
Dept: INTERNAL MEDICINE | Facility: CLINIC | Age: 85
End: 2020-10-10

## 2020-10-10 DIAGNOSIS — I42.8 NONISCHEMIC CARDIOMYOPATHY (H): ICD-10-CM

## 2020-10-10 DIAGNOSIS — F03.90 DEMENTIA WITHOUT BEHAVIORAL DISTURBANCE, UNSPECIFIED DEMENTIA TYPE: ICD-10-CM

## 2020-10-13 RX ORDER — CARVEDILOL 12.5 MG/1
12.5 TABLET ORAL 2 TIMES DAILY
Qty: 180 TABLET | Refills: 2 | Status: SHIPPED | OUTPATIENT
Start: 2020-10-13 | End: 2021-07-08

## 2020-11-07 ENCOUNTER — COMMUNICATION - HEALTHEAST (OUTPATIENT)
Dept: INTERNAL MEDICINE | Facility: CLINIC | Age: 85
End: 2020-11-07

## 2020-11-07 DIAGNOSIS — G89.29 CHRONIC BILATERAL LOW BACK PAIN WITH BILATERAL SCIATICA: ICD-10-CM

## 2020-11-07 DIAGNOSIS — M54.42 CHRONIC BILATERAL LOW BACK PAIN WITH BILATERAL SCIATICA: ICD-10-CM

## 2020-11-07 DIAGNOSIS — M54.41 CHRONIC BILATERAL LOW BACK PAIN WITH BILATERAL SCIATICA: ICD-10-CM

## 2021-01-11 ENCOUNTER — COMMUNICATION - HEALTHEAST (OUTPATIENT)
Dept: INTERNAL MEDICINE | Facility: CLINIC | Age: 86
End: 2021-01-11

## 2021-01-12 ENCOUNTER — COMMUNICATION - HEALTHEAST (OUTPATIENT)
Dept: FAMILY MEDICINE | Facility: CLINIC | Age: 86
End: 2021-01-12

## 2021-01-12 DIAGNOSIS — R11.0 NAUSEA: ICD-10-CM

## 2021-01-14 RX ORDER — LANSOPRAZOLE 30 MG/1
30 CAPSULE, DELAYED RELEASE ORAL DAILY
Qty: 90 CAPSULE | Refills: 3 | Status: SHIPPED | OUTPATIENT
Start: 2021-01-14 | End: 2021-12-30

## 2021-02-01 ENCOUNTER — COMMUNICATION - HEALTHEAST (OUTPATIENT)
Dept: FAMILY MEDICINE | Facility: CLINIC | Age: 86
End: 2021-02-01

## 2021-02-02 ENCOUNTER — OFFICE VISIT - HEALTHEAST (OUTPATIENT)
Dept: INTERNAL MEDICINE | Facility: CLINIC | Age: 86
End: 2021-02-02

## 2021-02-02 DIAGNOSIS — E78.5 HYPERLIPIDEMIA, UNSPECIFIED HYPERLIPIDEMIA TYPE: ICD-10-CM

## 2021-02-02 DIAGNOSIS — D64.9 CHRONIC ANEMIA: ICD-10-CM

## 2021-02-02 DIAGNOSIS — E55.9 VITAMIN D DEFICIENCY: ICD-10-CM

## 2021-02-02 DIAGNOSIS — I47.29 NSVT (NONSUSTAINED VENTRICULAR TACHYCARDIA) (H): ICD-10-CM

## 2021-02-02 DIAGNOSIS — M48.062 SPINAL STENOSIS OF LUMBAR REGION WITH NEUROGENIC CLAUDICATION: ICD-10-CM

## 2021-02-02 DIAGNOSIS — I10 ESSENTIAL HYPERTENSION: ICD-10-CM

## 2021-02-02 DIAGNOSIS — K21.9 GASTROESOPHAGEAL REFLUX DISEASE WITHOUT ESOPHAGITIS: ICD-10-CM

## 2021-02-02 DIAGNOSIS — F02.80 LATE ONSET ALZHEIMER'S DISEASE WITHOUT BEHAVIORAL DISTURBANCE (H): ICD-10-CM

## 2021-02-02 DIAGNOSIS — Z86.73 HISTORY OF CVA (CEREBROVASCULAR ACCIDENT): ICD-10-CM

## 2021-02-02 DIAGNOSIS — Z85.828 HISTORY OF SKIN CANCER: ICD-10-CM

## 2021-02-02 DIAGNOSIS — Z00.00 MEDICARE ANNUAL WELLNESS VISIT, SUBSEQUENT: ICD-10-CM

## 2021-02-02 DIAGNOSIS — E03.9 HYPOTHYROIDISM, UNSPECIFIED TYPE: ICD-10-CM

## 2021-02-02 DIAGNOSIS — Z78.0 POSTMENOPAUSAL: ICD-10-CM

## 2021-02-02 DIAGNOSIS — I42.8 CARDIOMYOPATHY, NONISCHEMIC (H): ICD-10-CM

## 2021-02-02 DIAGNOSIS — M81.0 AGE-RELATED OSTEOPOROSIS WITHOUT CURRENT PATHOLOGICAL FRACTURE: ICD-10-CM

## 2021-02-02 DIAGNOSIS — G30.1 LATE ONSET ALZHEIMER'S DISEASE WITHOUT BEHAVIORAL DISTURBANCE (H): ICD-10-CM

## 2021-02-02 DIAGNOSIS — I25.10 ASCVD (ARTERIOSCLEROTIC CARDIOVASCULAR DISEASE): ICD-10-CM

## 2021-02-02 DIAGNOSIS — H61.23 BILATERAL IMPACTED CERUMEN: ICD-10-CM

## 2021-02-02 LAB
ALBUMIN SERPL-MCNC: 3.3 G/DL (ref 3.5–5)
ALBUMIN UR-MCNC: NEGATIVE MG/DL
ALP SERPL-CCNC: 73 U/L (ref 45–120)
ALT SERPL W P-5'-P-CCNC: 15 U/L (ref 0–45)
ANION GAP SERPL CALCULATED.3IONS-SCNC: 10 MMOL/L (ref 5–18)
APPEARANCE UR: CLEAR
AST SERPL W P-5'-P-CCNC: 20 U/L (ref 0–40)
BACTERIA #/AREA URNS HPF: ABNORMAL HPF
BILIRUB SERPL-MCNC: 0.3 MG/DL (ref 0–1)
BILIRUB UR QL STRIP: NEGATIVE
BUN SERPL-MCNC: 29 MG/DL (ref 8–28)
CALCIUM SERPL-MCNC: 9.1 MG/DL (ref 8.5–10.5)
CHLORIDE BLD-SCNC: 104 MMOL/L (ref 98–107)
CHOLEST SERPL-MCNC: 180 MG/DL
CO2 SERPL-SCNC: 25 MMOL/L (ref 22–31)
COLOR UR AUTO: YELLOW
CREAT SERPL-MCNC: 1.2 MG/DL (ref 0.6–1.1)
ERYTHROCYTE [DISTWIDTH] IN BLOOD BY AUTOMATED COUNT: 12.1 % (ref 11–14.5)
FASTING STATUS PATIENT QL REPORTED: YES
GFR SERPL CREATININE-BSD FRML MDRD: 43 ML/MIN/1.73M2
GLUCOSE BLD-MCNC: 81 MG/DL (ref 70–125)
GLUCOSE UR STRIP-MCNC: NEGATIVE MG/DL
GRAN CASTS #/AREA URNS LPF: ABNORMAL LPF
HCT VFR BLD AUTO: 36.1 % (ref 35–47)
HDLC SERPL-MCNC: 62 MG/DL
HGB BLD-MCNC: 11.7 G/DL (ref 12–16)
HGB UR QL STRIP: ABNORMAL
KETONES UR STRIP-MCNC: NEGATIVE MG/DL
LDLC SERPL CALC-MCNC: 97 MG/DL
LEUKOCYTE ESTERASE UR QL STRIP: NEGATIVE
MCH RBC QN AUTO: 32.2 PG (ref 27–34)
MCHC RBC AUTO-ENTMCNC: 32.4 G/DL (ref 32–36)
MCV RBC AUTO: 99 FL (ref 80–100)
NITRATE UR QL: NEGATIVE
PH UR STRIP: 5 [PH] (ref 5–8)
PLATELET # BLD AUTO: 225 THOU/UL (ref 140–440)
PMV BLD AUTO: 9.3 FL (ref 7–10)
POTASSIUM BLD-SCNC: 4.7 MMOL/L (ref 3.5–5)
PROT SERPL-MCNC: 6.2 G/DL (ref 6–8)
RBC # BLD AUTO: 3.63 MILL/UL (ref 3.8–5.4)
RBC #/AREA URNS AUTO: ABNORMAL HPF
SODIUM SERPL-SCNC: 139 MMOL/L (ref 136–145)
SP GR UR STRIP: 1.02 (ref 1–1.03)
SQUAMOUS #/AREA URNS AUTO: ABNORMAL LPF
TRIGL SERPL-MCNC: 107 MG/DL
TSH SERPL DL<=0.005 MIU/L-ACNC: 0.26 UIU/ML (ref 0.3–5)
UROBILINOGEN UR STRIP-ACNC: ABNORMAL
WBC #/AREA URNS AUTO: ABNORMAL HPF
WBC: 7.4 THOU/UL (ref 4–11)

## 2021-02-02 ASSESSMENT — MIFFLIN-ST. JEOR: SCORE: 945.51

## 2021-02-03 ENCOUNTER — AMBULATORY - HEALTHEAST (OUTPATIENT)
Dept: NURSING | Facility: CLINIC | Age: 86
End: 2021-02-03

## 2021-02-03 ENCOUNTER — AMBULATORY - HEALTHEAST (OUTPATIENT)
Dept: SCHEDULING | Facility: CLINIC | Age: 86
End: 2021-02-03

## 2021-02-03 DIAGNOSIS — Z78.0 POSTMENOPAUSAL: ICD-10-CM

## 2021-02-03 LAB
25(OH)D3 SERPL-MCNC: 83.7 NG/ML (ref 30–80)
25(OH)D3 SERPL-MCNC: 83.7 NG/ML (ref 30–80)

## 2021-02-04 ENCOUNTER — AMBULATORY - HEALTHEAST (OUTPATIENT)
Dept: INTERNAL MEDICINE | Facility: CLINIC | Age: 86
End: 2021-02-04

## 2021-02-04 DIAGNOSIS — E55.9 VITAMIN D DEFICIENCY: ICD-10-CM

## 2021-02-04 DIAGNOSIS — E03.9 HYPOTHYROIDISM, UNSPECIFIED TYPE: ICD-10-CM

## 2021-02-04 RX ORDER — LEVOTHYROXINE SODIUM 75 UG/1
75 TABLET ORAL DAILY
Qty: 90 TABLET | Refills: 3 | Status: SHIPPED | OUTPATIENT
Start: 2021-02-04 | End: 2021-12-12

## 2021-02-05 ENCOUNTER — COMMUNICATION - HEALTHEAST (OUTPATIENT)
Dept: CARDIOLOGY | Facility: CLINIC | Age: 86
End: 2021-02-05

## 2021-02-08 ENCOUNTER — OFFICE VISIT - HEALTHEAST (OUTPATIENT)
Dept: OTOLARYNGOLOGY | Facility: CLINIC | Age: 86
End: 2021-02-08

## 2021-02-08 ENCOUNTER — OFFICE VISIT - HEALTHEAST (OUTPATIENT)
Dept: CARDIOLOGY | Facility: CLINIC | Age: 86
End: 2021-02-08

## 2021-02-08 DIAGNOSIS — H61.23 BILATERAL IMPACTED CERUMEN: ICD-10-CM

## 2021-02-08 DIAGNOSIS — I42.8 CARDIOMYOPATHY, NONISCHEMIC (H): ICD-10-CM

## 2021-02-08 ASSESSMENT — MIFFLIN-ST. JEOR: SCORE: 959.11

## 2021-02-24 ENCOUNTER — AMBULATORY - HEALTHEAST (OUTPATIENT)
Dept: NURSING | Facility: CLINIC | Age: 86
End: 2021-02-24

## 2021-02-25 ENCOUNTER — COMMUNICATION - HEALTHEAST (OUTPATIENT)
Dept: INTERNAL MEDICINE | Facility: CLINIC | Age: 86
End: 2021-02-25

## 2021-02-25 DIAGNOSIS — I10 ESSENTIAL HYPERTENSION: ICD-10-CM

## 2021-02-26 RX ORDER — LOSARTAN POTASSIUM 50 MG/1
50 TABLET ORAL DAILY
Qty: 90 TABLET | Refills: 3 | Status: SHIPPED | OUTPATIENT
Start: 2021-02-26 | End: 2022-02-07

## 2021-03-11 ENCOUNTER — AMBULATORY - HEALTHEAST (OUTPATIENT)
Dept: OTHER | Facility: CLINIC | Age: 86
End: 2021-03-11

## 2021-04-12 ENCOUNTER — COMMUNICATION - HEALTHEAST (OUTPATIENT)
Dept: INTERNAL MEDICINE | Facility: CLINIC | Age: 86
End: 2021-04-12

## 2021-04-12 DIAGNOSIS — F03.90 DEMENTIA WITHOUT BEHAVIORAL DISTURBANCE, UNSPECIFIED DEMENTIA TYPE: ICD-10-CM

## 2021-04-13 RX ORDER — MEMANTINE HYDROCHLORIDE 10 MG/1
TABLET ORAL
Qty: 180 TABLET | Refills: 1 | Status: SHIPPED | OUTPATIENT
Start: 2021-04-13 | End: 2021-11-09

## 2021-04-26 ENCOUNTER — RECORDS - HEALTHEAST (OUTPATIENT)
Dept: ADMINISTRATIVE | Facility: OTHER | Age: 86
End: 2021-04-26

## 2021-05-26 ENCOUNTER — RECORDS - HEALTHEAST (OUTPATIENT)
Dept: INTERNAL MEDICINE | Facility: CLINIC | Age: 86
End: 2021-05-26

## 2021-05-27 ENCOUNTER — RECORDS - HEALTHEAST (OUTPATIENT)
Dept: ADMINISTRATIVE | Facility: CLINIC | Age: 86
End: 2021-05-27

## 2021-05-27 NOTE — TELEPHONE ENCOUNTER
Patient dropped off letter from her insurance regarding Prolia, would this letter to be given to Dr Tate. Patient unsure of her last Prolia shot, would like someone to call her with this info.

## 2021-05-27 NOTE — TELEPHONE ENCOUNTER
Form placed in provider in box to review. Patient given last Prolia date of 12/21/18. Patient notified PCP out of office until Thursday.    Laura Ashby, CMA

## 2021-05-27 NOTE — TELEPHONE ENCOUNTER
She had her seventh injection of Prolia in December 2018.  She will need 3 more injections with the next due in June 2019.  Will obtain prior authorization.

## 2021-05-27 NOTE — TELEPHONE ENCOUNTER
Notified patient Shell would start a PA and call when completed to help set up Prolia appointment in June  Chelsy Eduardo CSS

## 2021-05-28 ENCOUNTER — RECORDS - HEALTHEAST (OUTPATIENT)
Dept: ADMINISTRATIVE | Facility: CLINIC | Age: 86
End: 2021-05-28

## 2021-05-28 NOTE — TELEPHONE ENCOUNTER
New Appointment Needed  What is the reason for the visit:    Same Date/Next Day Appt Request  What is the reason for your visit?: jaw pain X    Provider Preference: PCP only  How soon do you need to be seen?: today  Waitlist offered?: no  Okay to leave a detailed message:  Yes

## 2021-05-28 NOTE — TELEPHONE ENCOUNTER
I have no availability today on my schedule.  I agree that she needs to be assessed with an exam and x-ray.  Taking her to the local ER would be appropriate.

## 2021-05-28 NOTE — TELEPHONE ENCOUNTER
Spoke with patient and let her know Kehinde Tate MD message below.    Patient agrees to plan.    Didi BUTT LPN .......... 1:16 PM  05/10/19

## 2021-05-28 NOTE — TELEPHONE ENCOUNTER
Called [facility]. Pt fell on Tuesday night and hit head, face and jaw. Pt having jaw pain and he would like a xray. He would like her to see you as you know her well especially with her memory issues. Please advise.

## 2021-05-29 ENCOUNTER — RECORDS - HEALTHEAST (OUTPATIENT)
Dept: ADMINISTRATIVE | Facility: CLINIC | Age: 86
End: 2021-05-29

## 2021-05-29 NOTE — TELEPHONE ENCOUNTER
RN cannot approve Refill Requests x 2     RN can NOT refill this medication med is not covered by policy/route to provider. Last office visit: 6/14/2018 Kehinde Tate MD Last Physical: 12/21/2018 Last MTM visit: Visit date not found Last visit same specialty: 6/14/2018 Kehinde Tate MD.  Next visit within 3 mo: Visit date not found  Next physical within 3 mo: Visit date not found      Robyn Goddard, [facility] Triage/Med Refill 6/19/2019    Requested Prescriptions   Pending Prescriptions Disp Refills     nabumetone (RELAFEN) 750 MG tablet [Pharmacy Med Name: Nabumetone Oral Tablet 750 MG] 114 tablet 2     Sig: take 1 tablet by mouth twice daily.       There is no refill protocol information for this order        VITAMIN D2 50,000 unit capsule [Pharmacy Med Name: Vitamin D (Ergocalciferol) Oral Capsule 51188 UNIT] 12 capsule 2     Sig: TAKE 1 CAPSULE (50,000 UNITS TOTAL) BY MOUTH ONCE A WEEK.       There is no refill protocol information for this order

## 2021-05-29 NOTE — TELEPHONE ENCOUNTER
Refill Approved    Rx renewed per Medication Renewal Policy. Medication was last renewed on 3/13/19.    Kami Blackwell, [facility] Triage/Med Refill 5/29/2019     Requested Prescriptions   Pending Prescriptions Disp Refills     levothyroxine (SYNTHROID, LEVOTHROID) 100 MCG tablet [Pharmacy Med Name: Levothyroxine Sodium Oral Tablet 100 MCG] 90 tablet 0     Sig: Take 1 tablet (100 mcg total) by mouth daily.       Thyroid Hormones Protocol Passed - 5/29/2019  9:31 AM        Passed - Provider visit in past 12 months or next 3 months     Last office visit with prescriber/PCP: 6/14/2018 Kehinde Tate MD OR same dept: 6/14/2018 Kehinde Tate MD OR same specialty: 6/14/2018 Kehinde Tate MD  Last physical: 12/21/2018 Last MTM visit: Visit date not found   Next visit within 3 mo: Visit date not found  Next physical within 3 mo: Visit date not found  Prescriber OR PCP: Kehinde Tate MD  Last diagnosis associated with med order: 1. Hypothyroidism, unspecified type  - levothyroxine (SYNTHROID, LEVOTHROID) 100 MCG tablet [Pharmacy Med Name: Levothyroxine Sodium Oral Tablet 100 MCG]; Take 1 tablet (100 mcg total) by mouth daily.  Dispense: 90 tablet; Refill: 0    If protocol passes may refill for 12 months if within 3 months of last provider visit (or a total of 15 months).             Passed - TSH on file in past 12 months for patient age 12 & older     TSH   Date Value Ref Range Status   12/21/2018 1.17 0.30 - 5.00 uIU/mL Final

## 2021-05-30 ENCOUNTER — RECORDS - HEALTHEAST (OUTPATIENT)
Dept: ADMINISTRATIVE | Facility: CLINIC | Age: 86
End: 2021-05-30

## 2021-05-30 VITALS — HEIGHT: 63 IN | WEIGHT: 126 LBS | BODY MASS INDEX: 22.32 KG/M2

## 2021-05-30 VITALS — BODY MASS INDEX: 22.15 KG/M2 | HEIGHT: 63 IN | WEIGHT: 125 LBS

## 2021-05-30 VITALS — WEIGHT: 127 LBS | BODY MASS INDEX: 22.5 KG/M2 | HEIGHT: 63 IN

## 2021-05-30 VITALS — BODY MASS INDEX: 21.97 KG/M2 | WEIGHT: 124 LBS | HEIGHT: 63 IN

## 2021-05-30 VITALS — WEIGHT: 123 LBS | HEIGHT: 63 IN | BODY MASS INDEX: 21.79 KG/M2

## 2021-05-30 NOTE — TELEPHONE ENCOUNTER
"Prolia Injection Phone Screen      Screening questions have been asked 2-3 days prior to administration visit for Prolia. If any questions are answered with \"Yes,\" this phone encounter were will routed to ordering provider for further evaluation.     1.  When was the last injection?  12/18/18    2.  Has insurance for this injection been verified?  Yes    3.  Did you experience any new onset achiness or rashes that lasted for over a month with your previous Prolia injection?   No    4.  Do you have a fever over 101?F or a new deep cough that is unusual for you today? No    5.  Have you started any new medications in the last 6 months that you were told could affect your immune system? These may have been prescribed by oncologist, transplant, rheumatology, or dermatology.   No    6.  In the last 6 months have you have gastric bypass or parathyroid surgery?   No    7.  Do you plan dental work requiring drilling into the bone such as implants/extractions or oral surgery in the next 2-3 months?   No    Patient informed if symptoms discussed above present prior to their administration appointment, they are to notify clinic immediately.     Shell Eason               "

## 2021-05-30 NOTE — PROGRESS NOTES
The following steps were completed to comply with the REMS program for Prolia:  1. Ordering provider has previously reviewed information in the Medication Guide and Patient Counseling Chart, including the serious risks of Prolia  and the symptoms of each risk and have been advised to seek prompt medical attention if they have signs or symptoms of any of the serious risks.  2. Provided each patient a copy of the Medication Guide and Patient Brochure.  See MAR for administration details.   Indication: Prolia  (denosumab) is a prescription medicine used to treat osteoporosis in patients who:   Are at high risk for fracture, meaning patients who have had a fracture related to osteoporosis, or who have multiple risk factors for fracture; Cannot use another osteoporosis medicine or other osteoporosis medicines did not work well.   The timeline for early/late injections would be 4 weeks early and any time after the 6 month jonny. If a patient receives their injection late, then the subsequent injection would be 6 months from the date that they actually received the injection    Have the screening questions been asked prior to this administration? Yes     Carina Mckoy, CMA

## 2021-05-30 NOTE — TELEPHONE ENCOUNTER
RN cannot approve Refill Request    RN can NOT refill this medication overdue for office visits and/or labs.    Irvin Hudson, [facility] Triage/Med Refill 7/10/2019    Requested Prescriptions   Pending Prescriptions Disp Refills     memantine (NAMENDA) 10 MG tablet [Pharmacy Med Name: Memantine HCl Oral Tablet 10 MG] 180 tablet 2     Sig: TAKE ONE TABLET BY MOUTH TWICE DAILY       Cholinesterase Inhibitor/Anti-Alzheimer Agent Refill Protocol Failed - 7/10/2019  9:35 AM        Failed - Visit with PCP or prescribing provider visit in last 6 months or next 3 months     Last office visit with prescriber/PCP: Visit date not found OR same dept: Visit date not found OR same specialty: 6/14/2018 Kehinde Tate MD Last physical: Visit date not found Last MTM visit: Visit date not found     Next appt within 3 mo: Visit date not found  Next physical within 3 mo: Visit date not found  Prescriber OR PCP: Kehinde Tate MD  Last diagnosis associated with med order: There are no diagnoses linked to this encounter.  If protocol passes may refill for 6 months if within 3 months of last provider visit (or a total of 9 months).

## 2021-05-30 NOTE — PROGRESS NOTES
Office Visit - Follow Up   Karen Arthur   84 y.o. female    Date of Visit: 7/18/2019    Chief Complaint   Patient presents with     Hypertension     Back Pain     more difficulty walking        Assessment and Plan   1. Low back pain radiating to both legs  Chronic low back pain secondary to severe arthritis and scoliosis but with worsening pain now radiating down both legs, concern for developing lumbar spinal stenosis.  We will proceed with MRI lumbar spine and appointment at spine clinic.  - MR Lumbar Spine Without Contrast; Future  - Ambulatory referral to [facility]    2. Scoliosis of lumbar spine, unspecified scoliosis type  Contributing to her chronic low back pain normally managed with Relafen and Tylenol    3. Age related osteoporosis, unspecified pathological fracture presence  Continues on Prolia every 6 months    4. Hypothyroidism, unspecified type  Recheck TSH and adjust Synthroid if necessary  - Thyroid Stimulating Hormone (TSH)    5. Essential hypertension  Good blood pressure control with current medication    6. Dementia without behavioral disturbance  Alzheimer's type dementia.  Continue Namenda 10 mg twice daily.  She was unable to tolerate Aricept/Exelon secondary to side effects    7. Chronic fatigue  Increasing fatigue.  Proceed with metabolic evaluation  - HM2(CBC w/o Differential)  - Comprehensive Metabolic Panel    Return in about 6 months (around 1/18/2020) for Annual physical.     History of Present Illness   This 84 y.o. old woman with dementia, hypertension, hypothyroidism, severe osteoarthritis and chronic low back pain secondary to scoliosis and lumbar spondylosis along with osteoporosis on Prolia.  She is here with increasing pain across her lower back.  No recent fall or injury.  She is noticing more pain in her legs especially with walking.  Legs feeling weaker.  Denies numbness or tingling.  Feeling more tired and less energy.    Continues on Prolia for osteoporosis treatment.   She is do her for her next injection this week.    Review of Systems:  Otherwise, a comprehensive review of systems was negative except as noted.     Medications, Allergies and Problem List   Patient Active Problem List   Diagnosis     Hypothyroidism     Chronic back pain     Hyperlipidemia     Osteoarthritis of both hands     Vitamin D deficiency     Gastritis     RLS (restless legs syndrome)     Osteoarthritis, hip, bilateral     Chronic constipation     Chronic anemia     Dementia     Near syncope     Essential hypertension     NSVT (nonsustained ventricular tachycardia) (H)     GERD (gastroesophageal reflux disease)     Chronic right shoulder pain     Chronic fatigue     Nonischemic cardiomyopathy (H)     Osteoporosis     Hyponatremia     Scoliosis of lumbar spine, unspecified scoliosis type     Skin lesion of right ear     Low back pain radiating to both legs       She has a past surgical history that includes Lumbar laminectomy (2005); Cataract extraction, bilateral; Cholecystectomy (2007); Hemorroidectomy; Dilation and curettage of uterus; Tonsillectomy; Colonoscopy (2014); ORIF hip fracture (2015); and Breast lumpectomy.    Allergies   Allergen Reactions     Aricept [Donepezil]      Leg cramps       Exelon [Rivastigmine]      Nausea/vomiting     Lisinopril      Hypotension     Statins-Hmg-Coa Reductase Inhibitors Myalgia     Sulfa (Sulfonamide Antibiotics) Rash       Current Outpatient Medications   Medication Sig Dispense Refill     acetaminophen (TYLENOL) 500 MG tablet Take 1,000 mg by mouth 3 (three) times a day.       aspirin 325 MG EC tablet Take 325 mg by mouth daily.       carvedilol (COREG) 12.5 MG tablet Take 1 tablet (12.5 mg total) by mouth 2 (two) times a day with meals. 180 tablet 0     diphenhydrAMINE (BENADRYL) 25 mg tablet Take 50 mg by mouth bedtime.       ferrous sulfate 325 (65 FE) MG tablet Take 1 tablet (325 mg total) by mouth 3 (three) times a week. (Patient taking differently: Take 1  "tablet by mouth daily with breakfast. )  0     lansoprazole (PREVACID) 30 MG capsule Take 1 capsule (30 mg total) by mouth 2 (two) times a day. 180 capsule 2     levothyroxine (SYNTHROID, LEVOTHROID) 100 MCG tablet Take 1 tablet (100 mcg total) by mouth daily. 90 tablet 1     losartan (COZAAR) 50 MG tablet Take 1 tablet (50 mg total) by mouth at bedtime. 90 tablet 3     memantine (NAMENDA) 10 MG tablet TAKE ONE TABLET BY MOUTH TWICE DAILY 180 tablet 0     nabumetone (RELAFEN) 750 MG tablet take 1 tablet by mouth twice daily. 114 tablet 2     polyethylene glycol (MIRALAX) 17 gram packet Take 17 g by mouth daily as needed.        TURMERIC ROOT EXTRACT ORAL Take 300 mg by mouth 2 (two) times a day.              VITAMIN D2 50,000 unit capsule TAKE 1 CAPSULE (50,000 UNITS TOTAL) BY MOUTH ONCE A WEEK. 12 capsule 2     Current Facility-Administered Medications   Medication Dose Route Frequency Provider Last Rate Last Dose     denosumab 60 mg (PROLIA 60 mg/ml)  60 mg Subcutaneous Q6 Months Kehinde Tate MD   60 mg at 12/21/18 1156        Physical Exam   General Appearance:   Pleasant elderly woman    /64 (Patient Site: Left Arm, Patient Position: Sitting, Cuff Size: Adult Large)   Pulse 69   Ht 5' 4\" (1.626 m)   Wt 123 lb (55.8 kg)   LMP  (LMP Unknown)   SpO2 100%   BMI 21.11 kg/m          No reproducible tenderness in lumbar region  Negative straight leg raises bilaterally  Normal motor bilateral lower extremities     Additional Information   Social History     Tobacco Use     Smoking status: Never Smoker     Smokeless tobacco: Never Used   Substance Use Topics     Alcohol use: Yes     Alcohol/week: 0.6 oz     Types: 1 Glasses of wine per week     Comment: glass of wine with dinner     Drug use: No              Kehinde Tate MD  "

## 2021-05-31 VITALS — HEIGHT: 64 IN | WEIGHT: 123 LBS | BODY MASS INDEX: 21 KG/M2

## 2021-05-31 VITALS — HEIGHT: 63 IN | WEIGHT: 121.8 LBS | BODY MASS INDEX: 21.58 KG/M2

## 2021-05-31 VITALS — HEIGHT: 64 IN | BODY MASS INDEX: 20.49 KG/M2 | WEIGHT: 120 LBS

## 2021-05-31 VITALS — WEIGHT: 121 LBS | BODY MASS INDEX: 21.44 KG/M2 | HEIGHT: 63 IN

## 2021-05-31 NOTE — PROGRESS NOTES
ASSESSMENT: Karen Arthur is a 84 y.o. female with past medical history significant for hypothyroidism, hyperlipidemia, vitamin D deficiency, restless leg syndrome, dementia, hypertension, GERD, nonischemic cardiomyopathy, osteoporosis who presents today for new patient evaluation of chronic and progressive bilateral low back pain with radiation into the bilateral lower extremity's with limited walking and standing tolerance.  MRI lumbar spine shows extensive dextroconvex scoliosis with severe spinal stenosis at L3-4 and moderate spinal stenosis L2-3 and L4-5.  There is also severe right foraminal stenosis L5-S1 and moderate to severe right foraminal stenosis L4-5.  I believe patient is primarily symptomatic from the lumbar spinal stenosis.  She does have limited walking tolerance with a positive shopping cart sign.  She is likely secondarily symptomatic from lumbar facet arthropathy and disc degeneration.  On exam, patient remains neurologically intact.    KILEY: 38  Who 5: 18    PLAN:  A shared decision making model was used.  The patient's values and choices were respected.  The following represents what was discussed and decided upon by the physician assistant and the patient.      1.  DIAGNOSTIC TESTS: I reviewed the MRI lumbar spine.  No further diagnostic tests were ordered.    2.  PHYSICAL THERAPY:  Discussed the importance of core strengthening, ROM, stretching exercises with the patient and how each of these entities is important in decreasing pain.  Explained to the patient that the purpose of physical therapy is to teach the patient a home exercise program.  These exercises need to be performed every day in order to decrease pain and prevent future occurrences of pain.  I entered a referral for the patient begin physical therapy.  Patient requests to go to a facility in [place].  We will have her go to [place] for athletic medicine.    3.  MEDICATIONS: No changes are made to the patient's medications.   Uses Tylenol 1000 mill grams 3 times daily, nabumetone twice daily, and turmeric twice daily.  -Patient and her family note that they would like to avoid any additional pain relief medications.    4.  INTERVENTIONS:    -I offer the patient in L5-S1 interlaminar epidural steroid injection.  I chose this injection initially because she does have lumbar spinal stenosis with neurogenic claudication.  This is most severe at L3-4 but there is also moderate spinal stenosis L4-5.  Patient indicated she would like to proceed and an order was placed.  -If this does not help, we could consider a bilateral L3-4 transforaminal epidural steroid injection.  -If that did not help, we could try lumbar facet joint injections.    5.  PATIENT EDUCATION: Patient is in agree with the above plan.  All questions were answered.  -I told the patient that some people to go on to need spine surgery for this problem.  I am hopeful that we will be able to manage her pain with nonsurgical treatment options.  Surgery would be difficult given her significant deformity.    6.  FOLLOW-UP:   I will see patient back in clinic for a 2-week post procedure follow-up visit after her L5-S1 interlaminar epidural steroid injection.  If she has any questions or concerns in the meantime, she should not hesitate to call.      SUBJECTIVE:  Karen Arthur  Is a 84 y.o. female who presents today in consultation at the request of Dr. Tate for new patient evaluation of low back pain with radiation into the bilateral lower extremities.  Patient states that she has had low back pain for many years.  Patient has a history of lumbar spine surgery in 2005 with Dr. Dick.  Pain has been getting gradually worse.  Patient and her family note that she has been complaining more about her back and leg pain over the past 1 year.  She denies any specific injury or event to cause worsening pain.  Patient does have known scoliosis.  She found out about scoliosis as an  adult.    Patient complains of bilateral low back pain.  Pain spans across the low back at the waist.  She then has pain which radiates into the bilateral buttocks and down the lateral thighs to the knees.  Patient states that the leg pain switches sides.  She describes the back pain is aching.  Pain tends to be worse with increased activity.  Walking aggravates the pain.  Her family notes that she has difficulty walking down the block and back.  Often upon returning home she has to lay down to get some relief of her pain.  Patient notes that she is able to walk further and with less discomfort if she is leaning forward, such as on a shopping cart.  She states that she also applies some force with her hands to her left pelvis and right ribs when she is walking in order to minimize pain.  Patient reports that with sitting her pain resolves completely.  With laying down her pain also resolved completely.  She denies any difficulty sleeping.  Patient denies any numbness down the legs.  She does feel that her legs are weak.  She denies loss of bowel or bladder control.  Denies any recent fevers, chills, sweats.    Patient did physical therapy for her lower back years ago.  She states that she still does her home exercise program every morning.  She finds it to be very helpful to get her back loosened up.  She states that there are 8 or 9 exercises that she does every single day.  She does not go to a chiropractor.  She has not had any spine injections.  As mentioned above, she had lumbar spine surgery in 2005.  I suspect this may been a hemilaminectomy.  They were told it was only on one side of her spine.  Patient uses Tylenol 1000 mg 3 times per day, nabumetone twice daily, and turmeric twice daily.    Current Outpatient Medications on File Prior to Visit   Medication Sig Dispense Refill     acetaminophen (TYLENOL) 500 MG tablet Take 1,000 mg by mouth 3 (three) times a day.       aspirin 325 MG EC tablet Take 325 mg  by mouth daily.       carvedilol (COREG) 12.5 MG tablet Take 1 tablet (12.5 mg total) by mouth 2 (two) times a day with meals. 180 tablet 0     diphenhydrAMINE (BENADRYL) 25 mg tablet Take 50 mg by mouth bedtime.       ferrous sulfate 325 (65 FE) MG tablet Take 1 tablet (325 mg total) by mouth 3 (three) times a week. (Patient taking differently: Take 1 tablet by mouth daily with breakfast. )  0     lansoprazole (PREVACID) 30 MG capsule Take 1 capsule (30 mg total) by mouth 2 (two) times a day. 180 capsule 2     levothyroxine (SYNTHROID, LEVOTHROID) 100 MCG tablet Take 1 tablet (100 mcg total) by mouth daily. 90 tablet 1     losartan (COZAAR) 50 MG tablet Take 1 tablet (50 mg total) by mouth at bedtime. 90 tablet 3     memantine (NAMENDA) 10 MG tablet TAKE ONE TABLET BY MOUTH TWICE DAILY 180 tablet 0     nabumetone (RELAFEN) 750 MG tablet take 1 tablet by mouth twice daily. 114 tablet 2     polyethylene glycol (MIRALAX) 17 gram packet Take 17 g by mouth daily as needed.        TURMERIC ROOT EXTRACT ORAL Take 300 mg by mouth 2 (two) times a day.              VITAMIN D2 50,000 unit capsule TAKE 1 CAPSULE (50,000 UNITS TOTAL) BY MOUTH ONCE A WEEK. 12 capsule 2     Current Facility-Administered Medications on File Prior to Visit   Medication Dose Route Frequency Provider Last Rate Last Dose     denosumab 60 mg (PROLIA 60 mg/ml)  60 mg Subcutaneous Q6 Months Kehinde Tate MD   60 mg at 07/26/19 1007       Allergies   Allergen Reactions     Aricept [Donepezil]      Leg cramps       Exelon [Rivastigmine]      Nausea/vomiting     Lisinopril      Hypotension     Statins-Hmg-Coa Reductase Inhibitors Myalgia     Sulfa (Sulfonamide Antibiotics) Rash       Past Medical History:   Diagnosis Date     Appetite loss 5/5/2017     Benign microscopic hematuria     neg workup     Chest pain 11/2016    Hospitalization with chest pain, normal pharmacologic nuclear stress test, GERD suspected     Chronic anemia 1/4/2016     Chronic  back pain      Chronic constipation      Chronic fatigue 5/5/2017     Chronic right shoulder pain 4/4/2017     Dementia 07/28/2016    SLUMS 20 out of 30 in July 2016 started Namenda earlier this year     Diverticulosis      Eczema of scalp      Gallstones      Gastritis 2015    NSAIDs     GERD (gastroesophageal reflux disease) 11/8/2016    Responsible for recurrent chest pain      Hip fracture (H) 2015    ORIF hip fracture 2015     HTN (hypertension)      Hyperlipidemia      Hypothyroidism      Low back pain radiating to both legs 07/18/2019    MRI with severe central canal stenosis L3-L4     Lumbar radiculopathy     SHERIF 2014 L1-L2     Lumbar spinal stenosis     Low back pain with bilateral leg pain with MRI showing severe spinal stenosis L3-L4     Moderate protein-calorie malnutrition (H) 11/27/2017     Nausea and vomiting 11/27/2017    Recurrent, occurring monthly, hospitalization November 2017, GI evaluation including EGD suggesting esophageal dysmotility but no other abnormalities.  Questioning whether secondary to chronic constipation and now using MiraLAX.  Also holding Exelon patch, mildly elevated lipase of unclear significance March 2018     Near syncope 10/3/2016     Nonischemic cardiomyopathy (H)     Echocardiogram November 2016 with ejection fraction 35% with global moderate reduction in left ventricular systolic function, aortic sclerosis without significant aortic stenosis, however, cardiac MRI with ejection fraction 45% December 2016 which is more consistent with findings on nuclear stress test, follow-up echocardiogram July 2017 with improved ejection fraction of 50%     NSVT (nonsustained ventricular tachycardia) (H) 11/8/2016    Event monitor 2016     Occipital neuritis 2011     Osteoarthritis of both hands      Osteoarthritis, hip, bilateral      Osteoporosis     started Prolia 7/20/15 after hip fx Tscore -1.9, fourth injection given April 4, 2017, follow-up DEXA August 2017 T score -1.6 involving  left hip suggesting improvement, -2.4 distal radius     Pulmonary nodules 01/05/2017    5 mm nodules bilateral apices, indeterminate but follow-up scan July 2017 unchanged.  Appeared to be benign and no further imaging required     RLS (restless legs syndrome)      Scoliosis of lumbar spine, unspecified scoliosis type 12/21/2018     Skin lesion of right ear 12/21/2018     Vitamin D deficiency       Patient Active Problem List   Diagnosis     Hypothyroidism     Chronic back pain     Hyperlipidemia     Osteoarthritis of both hands     Vitamin D deficiency     Gastritis     RLS (restless legs syndrome)     Osteoarthritis, hip, bilateral     Chronic constipation     Chronic anemia     Dementia     Near syncope     Essential hypertension     NSVT (nonsustained ventricular tachycardia) (H)     GERD (gastroesophageal reflux disease)     Chronic right shoulder pain     Chronic fatigue     Nonischemic cardiomyopathy (H)     Osteoporosis     Hyponatremia     Scoliosis of lumbar spine, unspecified scoliosis type     Skin lesion of right ear     Low back pain radiating to both legs     Lumbar spinal stenosis         Past Surgical History:   Procedure Laterality Date     BREAST LUMPECTOMY       CATARACT EXTRACTION, BILATERAL       CHOLECYSTECTOMY  2007     COLONOSCOPY  2014     DILATION AND CURETTAGE OF UTERUS       HEMORROIDECTOMY       LUMBAR LAMINECTOMY  2005    L3 - L4     ORIF HIP FRACTURE  2015     TONSILLECTOMY       Family History   Problem Relation Age of Onset     Rheumatologic disease Mother      Heart attack Father      Sudden death Father 71     No Medical Problems Sister      No Medical Problems Brother      No Medical Problems Brother        Social history: Patient is .  She is retired.  She denies tobacco use.  She has 1 glass of wine or one half beer every day.  She denies illicit drug use.    ROS: Positive for sciatica, imbalance.  Specifically negative for bowel/bladder dysfunction, fevers,chills,  appetite changes, unexplained weight loss.   Otherwise 13 systems reviewed are negative.  Please see the patient's intake questionnaire from today for details.      OBJECTIVE:  PHYSICAL EXAMINATION:    CONSTITUTIONAL:  Vital signs as above.  No acute distress.  The patient is well nourished and well groomed.  PSYCHIATRIC:  The patient is awake, alert, oriented to person, place, time and answering questions appropriately with clear speech.    HEENT: Normocephalic, atraumatic.  Sclera clear.  Neck is supple.  SKIN:  Skin over the face, bilateral lower extremities, and posterior torso is clean, dry, intact without rashes.    GAIT:  Gait is non-antalgic.  The patient is able to rise onto toes and heels without difficulty.  STANDING EXAMINATION: Patient is a significant scoliotic deformity.  No significant tenderness palpation bilateral lower lumbar paraspinous muscles.  Lumbar flexion within normal limits.  Lumbar extension mildly restricted.  Lumbar facet loading maneuvers did not reproduce pain.  MUSCLE STRENGTH:  The patient has 5/5 strength for the bilateral hip flexors, knee flexors/extensors, ankle dorsiflexors/plantar flexors, great toe extensors, ankle evertors/invertors.  NEUROLOGICAL: 1-2+ patellar, and trace Achilles reflexes bilaterally.  Negative Babinski's bilaterally.  No ankle clonus bilaterally. Sensation to light touch is intact in the bilateral L4, L5, and S1 dermatomes.  VASCULAR:  2/4 dorsalis pedis pulses bilaterally.  Bilateral lower extremities are warm.  There is no pitting edema of the bilateral lower extremities.  ABDOMINAL:  Soft, non-distended, non-tender throughout all quadrants.  No pulsatile mass palpated in the left lower quadrant.  LYMPH NODES:  No palpable or tender inguinal lymph nodes.  MUSCULOSKELETAL: Straight leg raise negative bilaterally.    RESULTS: I reviewed the MRI lumbar spine from [facility] dated July 30, 2019.  This shows extensive dextroconvex scoliosis of the lumbar  spine.  There is severe spinal stenosis at L3-4 with redundancy of the cauda equina nerve roots.  There is moderate spinal stenosis L2-3 and L4-5.  Patient has multilevel foraminal stenosis which is most severe on the right at L5-S1.  There is also moderate to severe right foraminal stenosis L4-5.  Please see report for further details.

## 2021-05-31 NOTE — TELEPHONE ENCOUNTER
Reason contacted:  results  Information relayed:  Relayed message below to   Additional questions:  No  Further follow-up needed:  No  Okay to leave a detailed message:  No

## 2021-05-31 NOTE — TELEPHONE ENCOUNTER
----- Message from Kehinde Tate MD sent at 7/31/2019 12:34 PM CDT -----  Please call and tell her and her  that her MRI does show severe spinal stenosis that is likely explaining her leg symptoms.  She should keep appointment at spine clinic scheduled for August 2

## 2021-05-31 NOTE — PATIENT INSTRUCTIONS - HE
DISCHARGE INSTRUCTIONS    During office hours (8:00 a.m.- 4:30 p.m.) questions or concerns may be answered  by calling Spine Navigation Nurses at  [phone].     If you experience any problems after hours  please call [phone] and you will be connected to [facility].     All Patients:    ? You may experience an increase in your symptoms for the first 2 days (It may take anywhere between 2 days- 2 weeks for the steroid to have maximum effect).    ? You may use ice on the injection site, as frequently as 20 minutes each hour if needed.    ? You may take your pain medicine.    ? You may continue taking your regular medication after your injection. If you have had a Medial Branch Block you may resume pain medication once your pain diary is completed.    ? You may shower. No swimming, tub bath or hot tub for 48 hours.  You may remove your bandaid/bandage as soon as you are home.    ? You may resume light activities, as tolerated.    ? Resume your usual diet as tolerated.    ? It is strongly advised that you do not drive for 1-3 hours post injection.    ? If you have had oral sedation:  Do not drive for 8 hours post injection.      ? If you have had IV sedation:  Do not drive for 24 hours post injection.  Do not operate hazardous machinery or make important personal/business decisions for 24 hours.      POSSIBLE STEROID SIDE EFFECTS (If steroid/cortisone was used for your procedure)    -If you experience these symptoms, it should only last for a short period      Swelling of the legs                Skin redness (flushing)       Mouth (oral) irritation     Blood sugar (glucose) levels              Sweats                      Mood changes    Headache    Sleeplessness         POSSIBLE PROCEDURE SIDE EFFECTS  -Call the Spine Center if you are concerned    Increased Pain             Increased numbness/tingling        Nausea/Vomiting            Bruising/bleeding at site        Redness or swelling                                                 Difficulty walking        Weakness             Fever greater than 100.5    *In the event of a severe headache after an epidural steroid injection that is relieved by lying down, please call the [facility] to speak with a clinical staff member*

## 2021-06-01 VITALS — WEIGHT: 117.04 LBS | BODY MASS INDEX: 19.98 KG/M2 | HEIGHT: 64 IN

## 2021-06-01 VITALS — HEIGHT: 64 IN | WEIGHT: 120 LBS | BODY MASS INDEX: 20.49 KG/M2

## 2021-06-01 NOTE — PROGRESS NOTES
Assessment:   Karen Arthur is a 84 y.o. y.o. female with past medical history significant for hypothyroidism, hyperlipidemia, vitamin D deficiency, restless leg syndrome, dementia, hypertension, GERD, nonischemic cardiomyopathy, osteoporosis who presents today for follow-up regarding chronic and progressive bilateral low back pain with radiation into the bilateral lower activities with limited walking and standing tolerance.  MRI lumbar spine shows extensive dextroconvex scoliosis with severe spinal stenosis L3-4 and moderate spinal stenosis L2-3 and L4-5.  There is also high-grade foraminal stenosis.  Patient status post an L5-S1 interlaminar epidural steroid injection on August 19, 2019 which has provided 75% relief of her pain.  She is neurologically intact on exam.       Plan:     A shared decision making plan was used.  The patient's values and choices were respected.  The following represents what was discussed and decided upon by the physician assistant and the patient.      1.  DIAGNOSTIC TESTS: I reviewed the MRI lumbar spine.  No further diagnostic tests were ordered.    2.  PHYSICAL THERAPY: Patient has had physical therapy for her lower back in the past and she has been compliant with home exercise program.  She is planning to return for a refresher course of physical therapy at [facility] for athletic medicine in [place].    3.  MEDICATIONS: No changes are made to the patient's medications.  She uses Tylenol 1000 mill grams 3 times daily, nabumetone twice daily, and turmeric twice daily.  -Patient and her family know they would like to avoid additional pain relief medications.    4.  INTERVENTIONS: No additional interventions were ordered.  If pain were to return, recommend repeating the L5-S1 interlaminar epidural steroid injection.  - If pain seems more radicular in nature, we could also consider transforaminal epidural steroid injections.  -Of axial low back pain were to worsen, we could consider  facet joint injections.    5.  PATIENT EDUCATION:    -I told the patient that there are alternative options out there including acupuncture that can help with chronic pain.  Massage can provide short-term relief of her pain.  I would not recommend chiropractic.  -Patient is in agreement the above plan.  All questions were answered.    6.  FOLLOW-UP: I offered to follow-up with the patient in 6 weeks to check in.  They prefer to follow-up as needed.  If she has questions or concerns, she should not hesitate to call.    Subjective:     Karen Arthur is a 84 y.o. female who presents today for follow-up regarding chronic impressive bilateral low back pain with radiation into bilateral lower cavities.  Patient status post an L5-S1 interlaminar epidural steroid injection on 919, 2019.  Patient reports 75% improvement in her pain.  Patient has dementia so history is also given by the patient's  and son.    Patient states that she continues to have some pain in the back.  Patient states the pain extends from the mid thoracic region to the lumbar region in the midline.  She states that occasionally she feels pain in the left lower leg.  She denies any significant pain into the buttocks and thighs.  She rates her pain today as a 0-10.  At its worst it is a 3 out of 10.  At its best it is a 0 out of 10.  Pain tends to be aggravated with long walking and sitting for prolonged periods of time, such as long car rides.  Patient does note that she was able to walk 1000 steps after her injection which is further than she can normally walk.  The pain is alleviated with lying down.  She denies any numbness or tingling down the legs.  She feels generally weak.  She denies any new symptoms and she was last seen.    Patient has had physical therapy for her lower back in the past.  She does her home exercises.  She is planning to return to physical therapy at [facility] for athletic medicine in [place].  She uses Tylenol 1000  mill grams 3 times daily, nabumetone twice daily, and turmeric twice daily.    Past medical history is reviewed and is unchanged.    Review of Systems:  Positive for weakness, stumbles.  Negative for numbness/tingling, loss of bowel/bladder control, inability to urinate, headache, pain much worse at night, difficulty swallowing, difficulty with hand skills, fevers, unintentional weight loss.     Objective:   CONSTITUTIONAL:  Vital signs as above.  No acute distress.  The patient is well nourished and well groomed.    PSYCHIATRIC:  The patient is awake, alert, oriented to person, place and time.  The patient is answering questions appropriately with clear speech.  Normal affect.  HEENT: Normocephalic, atraumatic.  Sclera clear.    SKIN:  Skin over the face, posterior torso, bilateral upper and lower extremities is clean, dry, intact without rashes.  MUSCULOSKELETAL:  Gait is guarded but not antalgic.  Patient has a significant scoliotic deformity.  The patient is able to rise onto toes and heels bilaterally with support.  No  tenderness over the bilateral lower lumbar paraspinal muscles.      The patient has 5/5 strength for the bilateral hip flexors, knee flexors/extensors, ankle dorsiflexors/plantar flexors, ankle evertors/invertors.    NEUROLOGICAL:    Sensation to light touch is intact in the bilateral L4, L5, and S1 dermatomes.       RESULTS:  I reviewed the MRI lumbar spine from [facility] dated July 30, 2019.  This shows extensive dextroconvex scoliosis of the lumbar spine.  There is severe spinal stenosis at L3-4 with redundancy of the cauda equina nerve roots.  There is moderate spinal stenosis L2-3 and L4-5.  Patient has multilevel foraminal stenosis which is most severe on the right at L5-S1.  There is also moderate to severe right foraminal stenosis L4-5.  Please see report for further details.

## 2021-06-02 VITALS — WEIGHT: 124 LBS | BODY MASS INDEX: 21.17 KG/M2 | HEIGHT: 64 IN

## 2021-06-02 VITALS — HEIGHT: 64 IN | BODY MASS INDEX: 20.32 KG/M2 | WEIGHT: 119 LBS

## 2021-06-02 NOTE — TELEPHONE ENCOUNTER
Refill Approved    Rx renewed per Medication Renewal Policy. Medication was last renewed on 7/10/19.    Kami Blackwell, [facility] Triage/Med Refill 10/18/2019     Requested Prescriptions   Pending Prescriptions Disp Refills     memantine (NAMENDA) 10 MG tablet [Pharmacy Med Name: Memantine HCl Oral Tablet 10 MG] 180 tablet 0     Sig: TAKE 1 TABLET BY MOUTH TWICE DAILY       Cholinesterase Inhibitor/Anti-Alzheimer Agent Refill Protocol Passed - 10/18/2019 10:22 AM        Passed - Visit with PCP or prescribing provider visit in last 6 months or next 3 months     Last office visit with prescriber/PCP: Visit date not found OR same dept: 7/18/2019 Kehinde Tate MD OR same specialty: 7/18/2019 Kehinde Tate MD Last physical: Visit date not found Last MTM visit: Visit date not found     Next appt within 3 mo: Visit date not found  Next physical within 3 mo: Visit date not found  Prescriber OR PCP: Girish Tinoco MD  Last diagnosis associated with med order: 1. Dementia without behavioral disturbance, unspecified dementia type (H)  - memantine (NAMENDA) 10 MG tablet [Pharmacy Med Name: Memantine HCl Oral Tablet 10 MG]; TAKE 1 TABLET BY MOUTH TWICE DAILY  Dispense: 180 tablet; Refill: 0    If protocol passes may refill for 6 months if within 3 months of last provider visit (or a total of 9 months).

## 2021-06-03 VITALS — WEIGHT: 123 LBS | BODY MASS INDEX: 21.11 KG/M2

## 2021-06-03 VITALS — HEIGHT: 64 IN | WEIGHT: 123 LBS | BODY MASS INDEX: 21 KG/M2

## 2021-06-03 NOTE — TELEPHONE ENCOUNTER
Refill Approved    Rx renewed per Medication Renewal Policy. Medication was last renewed on 5/29/19.    Kami Blackwell, [facility] Triage/Med Refill 11/22/2019     Requested Prescriptions   Pending Prescriptions Disp Refills     levothyroxine (SYNTHROID, LEVOTHROID) 100 MCG tablet [Pharmacy Med Name: Levothyroxine Sodium Oral Tablet 100 MCG] 90 tablet 0     Sig: Take 1 tablet (100 mcg total) by mouth daily.       Thyroid Hormones Protocol Passed - 11/21/2019  7:00 AM        Passed - Provider visit in past 12 months or next 3 months     Last office visit with prescriber/PCP: 7/18/2019 Kehinde Tate MD OR same dept: 7/18/2019 Kehinde Tate MD OR same specialty: 7/18/2019 Kehinde Tate MD  Last physical: 12/21/2018 Last MTM visit: Visit date not found   Next visit within 3 mo: Visit date not found  Next physical within 3 mo: Visit date not found  Prescriber OR PCP: Kehinde Tate MD  Last diagnosis associated with med order: 1. Hypothyroidism, unspecified type  - levothyroxine (SYNTHROID, LEVOTHROID) 100 MCG tablet [Pharmacy Med Name: Levothyroxine Sodium Oral Tablet 100 MCG]; Take 1 tablet (100 mcg total) by mouth daily.  Dispense: 90 tablet; Refill: 0    If protocol passes may refill for 12 months if within 3 months of last provider visit (or a total of 15 months).             Passed - TSH on file in past 12 months for patient age 12 & older     TSH   Date Value Ref Range Status   07/18/2019 0.89 0.30 - 5.00 uIU/mL Final

## 2021-06-04 VITALS — BODY MASS INDEX: 20.49 KG/M2 | HEIGHT: 64 IN | WEIGHT: 120 LBS

## 2021-06-04 VITALS
SYSTOLIC BLOOD PRESSURE: 154 MMHG | DIASTOLIC BLOOD PRESSURE: 72 MMHG | WEIGHT: 122 LBS | HEART RATE: 68 BPM | RESPIRATION RATE: 16 BRPM | BODY MASS INDEX: 20.83 KG/M2 | HEIGHT: 64 IN

## 2021-06-04 VITALS
BODY MASS INDEX: 21.34 KG/M2 | RESPIRATION RATE: 16 BRPM | WEIGHT: 125 LBS | DIASTOLIC BLOOD PRESSURE: 80 MMHG | SYSTOLIC BLOOD PRESSURE: 140 MMHG | HEART RATE: 64 BPM | HEIGHT: 64 IN

## 2021-06-04 VITALS
BODY MASS INDEX: 21 KG/M2 | DIASTOLIC BLOOD PRESSURE: 74 MMHG | WEIGHT: 123 LBS | HEIGHT: 64 IN | SYSTOLIC BLOOD PRESSURE: 138 MMHG

## 2021-06-04 VITALS — HEIGHT: 64 IN | WEIGHT: 123 LBS | BODY MASS INDEX: 21 KG/M2

## 2021-06-04 VITALS
RESPIRATION RATE: 20 BRPM | HEIGHT: 64 IN | SYSTOLIC BLOOD PRESSURE: 138 MMHG | HEART RATE: 68 BPM | DIASTOLIC BLOOD PRESSURE: 70 MMHG | WEIGHT: 123 LBS | BODY MASS INDEX: 21 KG/M2

## 2021-06-04 VITALS
HEART RATE: 69 BPM | DIASTOLIC BLOOD PRESSURE: 62 MMHG | OXYGEN SATURATION: 100 % | WEIGHT: 122 LBS | BODY MASS INDEX: 20.83 KG/M2 | HEIGHT: 64 IN | SYSTOLIC BLOOD PRESSURE: 120 MMHG

## 2021-06-04 VITALS — BODY MASS INDEX: 20.66 KG/M2 | HEIGHT: 64 IN | WEIGHT: 121 LBS

## 2021-06-04 VITALS
BODY MASS INDEX: 21.28 KG/M2 | DIASTOLIC BLOOD PRESSURE: 54 MMHG | HEART RATE: 65 BPM | SYSTOLIC BLOOD PRESSURE: 124 MMHG | WEIGHT: 124 LBS

## 2021-06-04 VITALS
HEART RATE: 58 BPM | SYSTOLIC BLOOD PRESSURE: 138 MMHG | DIASTOLIC BLOOD PRESSURE: 73 MMHG | BODY MASS INDEX: 21.11 KG/M2 | WEIGHT: 123 LBS

## 2021-06-04 VITALS
SYSTOLIC BLOOD PRESSURE: 126 MMHG | HEIGHT: 64 IN | HEART RATE: 51 BPM | WEIGHT: 121 LBS | BODY MASS INDEX: 20.66 KG/M2 | DIASTOLIC BLOOD PRESSURE: 72 MMHG | OXYGEN SATURATION: 99 %

## 2021-06-04 VITALS — WEIGHT: 121 LBS | BODY MASS INDEX: 20.66 KG/M2 | HEIGHT: 64 IN

## 2021-06-04 NOTE — PROGRESS NOTES
Assessment and Plan:       1. Medicare annual wellness visit, subsequent  Immunizations are reviewed and everything is up-to-date.  Living will discussed.  Non-smoker.  Uses alcohol in moderation.  Regular exercise discussed.  Further colonoscopies are not recommended given age and comorbidities.  Breast exam completed but she declines having further mammograms.   Last DEXA was 2017 and this should be repeated next year. Dementia and depression screening completed.  She sees an ophthalmologist regularly and gets glaucoma screening.  Skin exam performed and recommending regular use of sunblock.  Will screen for diabetes with fasting glucose.      2. Dementia without behavioral disturbance, unspecified dementia type (H)  Alzheimer's type dementia getting progressively worse according to  who I was able to speak with prior to the appointment.  Still able to perform her hygiene needs and gets herself dressed but needing help with other activities of daily living including medication supervision and meals.  Needs 24-hour supervision.  Unable to tolerate Aricept or Exelon.  Continue Namenda 10 mg twice daily.    3. Nonischemic cardiomyopathy (H)  Last echocardiogram with improved left ventricular systolic function    4. NSVT (nonsustained ventricular tachycardia) (H)  Stable with carvedilol    5. Essential hypertension  Blood pressure not at goal and will increase losartan to 100 mg daily  - Comprehensive Metabolic Panel  - Urinalysis-UC if Indicated  - losartan (COZAAR) 100 MG tablet; Take 1 tablet (100 mg total) by mouth daily.  Dispense: 90 tablet; Refill: 3  - Culture, Urine    6. Age-related osteoporosis without current pathological fracture  She will be due for her 10th and final dose of Prolia in February.  We will plan to repeat DEXA in the spring or summer 2020.  She tries to get adequate calcium and vitamin D    7. Low back pain radiating to both legs  Chronic low back pain secondary to severe  degenerative disc disease and scoliosis.  MRI earlier this year showing severe stenosis at L3-L4.  Evaluated at spine clinic and further physical therapy recommended.  He continues to do her home exercises.  Using scheduled acetaminophen and nabumetone to manage her pain.    8. Chronic anemia  Continues on iron replacement.  Will recheck CBC.  - ferrous sulfate 325 (65 FE) MG tablet; Take 1 tablet (325 mg total) by mouth 3 (three) times a week.; Refill: 0  - HM2(CBC w/o Differential)    9. Vitamin D deficiency  Recheck vitamin D level  - Vitamin D, Total (25-Hydroxy)    10. Hypothyroidism, unspecified type  Monitor TSH and adjust dose of Synthroid as needed  - Thyroid Stimulating Hormone (TSH)    11. Hyperlipidemia, unspecified hyperlipidemia type    - Lipid Cascade    12. Gastroesophageal reflux disease without esophagitis  Well-controlled with lansoprazole    13. Chronic fatigue  Checking appropriate metabolic studies    14. History of skin cancer  Lesion removed from left ear.  New lesion posterior to ear needs evaluation by dermatology    Over 25 minutes was spent addressing these chronic and new medical problems beyond time spent performing annual wellness visit with over 50% of the time spent counseling and coordination of care including discussing worsening dementia, chronic low back pain, osteoporosis treatment    The patient's current medical problems were reviewed.    I have had an Advance Directives discussion with the patient.  The following health maintenance schedule was reviewed with the patient and provided in printed form in the after visit summary:   Health Maintenance   Topic Date Due     INFLUENZA VACCINE RULE BASED (1) 08/01/2019     DXA SCAN  08/31/2019     MEDICARE ANNUAL WELLNESS VISIT  12/21/2019     FALL RISK ASSESSMENT  12/30/2020     TD 18+ HE  06/08/2022     ADVANCE CARE PLANNING  12/30/2024     PNEUMOCOCCAL IMMUNIZATION 65+ LOW/MEDIUM RISK  Completed     ZOSTER VACCINES  Completed         Subjective:   Chief Complaint: Karen Arthur is an 84 y.o. female here for an Annual Wellness visit.   HPI: In addition to annual wellness visit, multiple chronic medical problems discussed at today's visit    She has dementia and  reports worsening of cognition becoming more forgetful.  Needs help with many of her ADLs and requires 24-hour supervision by himself or children.  She is still able to dress herself and take care of her toileting needs.  She needs supervision with medications.    Chronic low back pain secondary to arthritis and scoliosis.  Evaluated at spine clinic.  Physical therapy recommended.  She has not scheduled but continues with home exercise routine which has been helpful    Osteoporosis completing 5 years of Prolia with next dose due in February.  Trying to maintain good calcium and vitamin D intake    Reflux well controlled with lansoprazole    Review of Systems:    Please see above.  The rest of the review of systems are negative for all systems.    Patient Care Team:  Kehinde Tate MD as PCP - General (Internal Medicine)  Kehinde Tate MD as Assigned PCP     Patient Active Problem List   Diagnosis     Hypothyroidism     Chronic back pain     Hyperlipidemia     Osteoarthritis of both hands     Vitamin D deficiency     Gastritis     RLS (restless legs syndrome)     Osteoarthritis, hip, bilateral     Chronic constipation     Chronic anemia     Dementia (H)     Near syncope     Essential hypertension     NSVT (nonsustained ventricular tachycardia) (H)     GERD (gastroesophageal reflux disease)     Chronic right shoulder pain     Chronic fatigue     Nonischemic cardiomyopathy (H)     Osteoporosis     Hyponatremia     Scoliosis of lumbar spine, unspecified scoliosis type     Skin lesion of right ear     Low back pain radiating to both legs     Lumbar spinal stenosis     Past Medical History:   Diagnosis Date     Appetite loss 5/5/2017     Benign microscopic hematuria      neg workup     Chest pain 11/2016    Hospitalization with chest pain, normal pharmacologic nuclear stress test, GERD suspected     Chronic anemia 1/4/2016     Chronic back pain      Chronic constipation      Chronic fatigue 5/5/2017     Chronic right shoulder pain 4/4/2017     Dementia (H) 07/28/2016    SLUMS 20 out of 30 in July 2016 started Namenda earlier this year     Diverticulosis      Eczema of scalp      Gallstones      Gastritis 2015    NSAIDs     GERD (gastroesophageal reflux disease) 11/8/2016    Responsible for recurrent chest pain      Hip fracture (H) 2015    ORIF hip fracture 2015     HTN (hypertension)      Hyperlipidemia      Hypothyroidism      Low back pain radiating to both legs 07/18/2019    MRI with severe central canal stenosis L3-L4     Lumbar radiculopathy     SHERIF 2014 L1-L2     Lumbar spinal stenosis     Low back pain with bilateral leg pain with MRI showing severe spinal stenosis L3-L4     Moderate protein-calorie malnutrition (H) 11/27/2017     Nausea and vomiting 11/27/2017    Recurrent, occurring monthly, hospitalization November 2017, GI evaluation including EGD suggesting esophageal dysmotility but no other abnormalities.  Questioning whether secondary to chronic constipation and now using MiraLAX.  Also holding Exelon patch, mildly elevated lipase of unclear significance March 2018     Near syncope 10/3/2016     Nonischemic cardiomyopathy (H)     Echocardiogram November 2016 with ejection fraction 35% with global moderate reduction in left ventricular systolic function, aortic sclerosis without significant aortic stenosis, however, cardiac MRI with ejection fraction 45% December 2016 which is more consistent with findings on nuclear stress test, follow-up echocardiogram July 2017 with improved ejection fraction of 50%     NSVT (nonsustained ventricular tachycardia) (H) 11/8/2016    Event monitor 2016     Occipital neuritis 2011     Osteoarthritis of both hands      Osteoarthritis,  hip, bilateral      Osteoporosis     started Prolia 7/20/15 after hip fx Tscore -1.9, fourth injection given April 4, 2017, follow-up DEXA August 2017 T score -1.6 involving left hip suggesting improvement, -2.4 distal radius     Pulmonary nodules 01/05/2017    5 mm nodules bilateral apices, indeterminate but follow-up scan July 2017 unchanged.  Appeared to be benign and no further imaging required     RLS (restless legs syndrome)      Scoliosis of lumbar spine, unspecified scoliosis type 12/21/2018     Skin lesion of right ear 12/21/2018     Vitamin D deficiency       Past Surgical History:   Procedure Laterality Date     BREAST LUMPECTOMY       CATARACT EXTRACTION, BILATERAL       CHOLECYSTECTOMY  2007     COLONOSCOPY  2014     DILATION AND CURETTAGE OF UTERUS       HEMORROIDECTOMY       LUMBAR LAMINECTOMY  2005    L3 - L4     ORIF HIP FRACTURE  2015     TONSILLECTOMY        Family History   Problem Relation Age of Onset     Rheumatologic disease Mother      Heart attack Father      Sudden death Father 71     No Medical Problems Sister      No Medical Problems Brother      No Medical Problems Brother       Social History     Socioeconomic History     Marital status:      Spouse name: Not on file     Number of children: 6     Years of education: Not on file     Highest education level: Not on file   Occupational History     Occupation: Homemaker   Social Needs     Financial resource strain: Not on file     Food insecurity:     Worry: Not on file     Inability: Not on file     Transportation needs:     Medical: Not on file     Non-medical: Not on file   Tobacco Use     Smoking status: Never Smoker     Smokeless tobacco: Never Used   Substance and Sexual Activity     Alcohol use: Yes     Alcohol/week: 1.0 standard drinks     Types: 1 Glasses of wine per week     Comment: glass of wine with dinner     Drug use: No     Sexual activity: Never   Lifestyle     Physical activity:     Days per week: Not on file      Minutes per session: Not on file     Stress: Not on file   Relationships     Social connections:     Talks on phone: Not on file     Gets together: Not on file     Attends Baptism service: Not on file     Active member of club or organization: Not on file     Attends meetings of clubs or organizations: Not on file     Relationship status: Not on file     Intimate partner violence:     Fear of current or ex partner: Not on file     Emotionally abused: Not on file     Physically abused: Not on file     Forced sexual activity: Not on file   Other Topics Concern     Not on file   Social History Narrative     Not on file      Current Outpatient Medications   Medication Sig Dispense Refill     acetaminophen (TYLENOL) 500 MG tablet Take 1,000 mg by mouth 3 (three) times a day.       aspirin 325 MG EC tablet Take 325 mg by mouth daily.       carvedilol (COREG) 12.5 MG tablet Take 1 tablet (12.5 mg total) by mouth 2 (two) times a day. 180 tablet 3     diphenhydrAMINE (BENADRYL) 25 mg tablet Take 50 mg by mouth bedtime.       ferrous sulfate 325 (65 FE) MG tablet Take 1 tablet (325 mg total) by mouth 3 (three) times a week.  0     lansoprazole (PREVACID) 30 MG capsule Take 1 capsule (30 mg total) by mouth 2 (two) times a day. 180 capsule 2     levothyroxine (SYNTHROID, LEVOTHROID) 100 MCG tablet Take 1 tablet (100 mcg total) by mouth daily. 90 tablet 2     losartan (COZAAR) 50 MG tablet Take 1 tablet (50 mg total) by mouth at bedtime. 90 tablet 3     memantine (NAMENDA) 10 MG tablet TAKE 1 TABLET BY MOUTH TWICE DAILY 180 tablet 1     nabumetone (RELAFEN) 750 MG tablet take 1 tablet by mouth twice daily. 114 tablet 2     polyethylene glycol (MIRALAX) 17 gram packet Take 17 g by mouth daily as needed.        TURMERIC ROOT EXTRACT ORAL Take 300 mg by mouth 2 (two) times a day.              VITAMIN D2 50,000 unit capsule TAKE 1 CAPSULE (50,000 UNITS TOTAL) BY MOUTH ONCE A WEEK. 12 capsule 2     Current Facility-Administered  "Medications   Medication Dose Route Frequency Provider Last Rate Last Dose     denosumab 60 mg (PROLIA 60 mg/ml)  60 mg Subcutaneous Q6 Months Kehinde Tate MD   60 mg at 07/26/19 1007      Objective:   Vital Signs:   Visit Vitals  /78 (Patient Site: Left Arm, Patient Position: Sitting, Cuff Size: Adult Regular)   Pulse 68   Resp 16   Ht 5' 4\" (1.626 m)   Wt 122 lb (55.3 kg)   LMP  (LMP Unknown)   BMI 20.94 kg/m           PHYSICAL EXAM  EYES: Eyelids, conjunctiva, and sclera were normal. Pupils were normal. Cornea, iris, and lens were normal bilaterally.  HEAD, EARS, NOSE, MOUTH, AND THROAT: Head and face were normal. Nose appearance was normal and there was no discharge. Oropharynx was normal.  NECK: Neck appearance was normal. There were no neck masses and the thyroid was not enlarged and no nodules are felt.  No lymphadenopathy.  RESPIRATORY: Breathing pattern was normal and the chest moved symmetrically.  Percussion/auscultatory percussion was normal.  Lung sounds were normal and there were no rales or wheezes.  CARDIOVASCULAR: Heart rate and rhythm were normal.  S1 and S2 were normal and there were no extra sounds or murmurs. Peripheral pulses in arms and legs were normal.  Jugular venous pressure was normal.  There was no peripheral edema.  No carotid bruits.  BREAST: No palpable masses or tenderness.  No axillary nodes.  GASTROINTESTINAL: The abdomen was normal in contour.  Bowel sounds were present.   Palpation detected no tenderness, mass, or enlarged organs.   MUSCULOSKELETAL: Skeletal configuration was normal and muscle mass was normal for age. Joint appearance was overall normal.  LYMPHATIC: There were no enlarged nodes.  SKIN/HAIR/NAILS: Skin color was normal.  Hair and nails were normal.suspicious lesion posterior to left ear, cannot exclude squamous cell skin cancer  NEUROLOGIC: The patient was alert and oriented to person, place, time, and circumstance. Speech was normal. Cranial nerves " were normal. Motor strength was normal for age. The patient was normally coordinated.  Sensation intact.  PSYCHIATRIC: Mood and affect normal.    Assessment Results 12/30/2019   Activities of Daily Living No help needed   Instrumental Activities of Daily Living -   Get Up and Go Score -   Mini Cog Total Score 1   Some recent data might be hidden     A Mini-Cog score of 0-2 suggests the possibility of dementia, score of 3-5 suggests no dementia    Identified Health Risks:     The patient was counseled and encouraged to consider modifying their diet and eating habits. She was provided with information on recommended healthy diet options.  Information on urinary incontinence and treatment options given to patient.  She is at risk for falling and has been provided with information to reduce the risk of falling at home.  Patient's advanced directive was discussed and I am comfortable with the patient's wishes.        The patient was provided with appropriate referrals to address her memory problem.

## 2021-06-05 VITALS
WEIGHT: 120 LBS | TEMPERATURE: 96.5 F | BODY MASS INDEX: 21.26 KG/M2 | SYSTOLIC BLOOD PRESSURE: 120 MMHG | HEART RATE: 62 BPM | OXYGEN SATURATION: 98 % | DIASTOLIC BLOOD PRESSURE: 70 MMHG | HEIGHT: 63 IN

## 2021-06-05 VITALS
WEIGHT: 123 LBS | BODY MASS INDEX: 21.79 KG/M2 | DIASTOLIC BLOOD PRESSURE: 62 MMHG | RESPIRATION RATE: 16 BRPM | HEIGHT: 63 IN | HEART RATE: 68 BPM | SYSTOLIC BLOOD PRESSURE: 136 MMHG

## 2021-06-05 NOTE — TELEPHONE ENCOUNTER
Notes recorded by Shanell Levine RN on 1/30/2020 at 4:19 PM CST  Called pt and spoke to pt and pts  regarding results and recommendations. Pt agrees to proceed with coronary angiogram. Will place order and have scheduling call pt to setup. Patient verbalizes understanding and agrees with plan. No further questions or concerns at this time.

## 2021-06-05 NOTE — TELEPHONE ENCOUNTER
Prior Authorization Request  Who s requesting:  Pharmacy  Pharmacy Name and Location: [facility] #[id number]  Medication Name: lansoprazole (PREVACID) 30 MG capsule  Insurance Plan: No info under the Verify Rx Benefits tab.   Insurance Member ID Number:  No info under the Verify Rx Benefits tab.   CoverMyMeds Key: [id number]  Informed patient that prior authorizations can take up to 10 business days for response:   NA  Okay to leave a detailed message: No

## 2021-06-05 NOTE — TELEPHONE ENCOUNTER
Dr. Tate,     There is an ABN for the stress test order. Please place in new order with different dx code.

## 2021-06-05 NOTE — TELEPHONE ENCOUNTER
I spoke with Diallo and Karen cho regarding her stress test showing new decreased left ventricular systolic function with area of hypokinesis and small area of infarction involving anterior wall raising concerns for recent MI.  With recent symptoms that she had including near syncope followed by prolonged fatigue, I would like her to be evaluated by cardiology especially as they are leaving for [place] next week.

## 2021-06-05 NOTE — TELEPHONE ENCOUNTER
Had a spell yesterday and has been dealing with it with Lea.    Patient upset he was just transferred while making appointment for his wife and self.  He declines triage.    Transferred to scheduling for an appointment.    Viridiana Olivares RN Triage and refills

## 2021-06-05 NOTE — PROGRESS NOTES
[facility] Note    Assessment:    Karen Arthur is a 84 y.o. old female with cardiomyopathy (LVEF 35-65-back to 37% now), HTN, HL, NSVT, episodes of near-syncope, dementia who is here for w/u of an abnormal stress test, again worsened cardiomyopathy.         Plan:  # Abnormal stress test with again worsened cardiomyopathy and small area of anterior/anteroseptal scar   - it is unlikely that these findings are correlated with her pre-syncopal episode  - it seems that LBBB is quite possibly explanation for her Nuc findings but since she has never had angiography, I think she would need one eventually - we have discussed CTA cor angio vs invasive angiography +/- PCI and are leaning towards the latter  - they are traveling to [place] next week, so can not follow with my recommendation of an urgent study and would prefer to do one upon return in 1 mos  - I asked her to at least obtain a TTE now to confirm LVEF and rule out significant valvular disease  - she knows to seek medical attention immediately for any CP, worsening shortness of breath, additional syncope  - if she has no obstructive CAD and/or persistent LV dysfunction after any revascularization, would consider utility of CRT  - in the meantime, continue ASA, carvedilol/losartan  - she knows to stay hydrated, allow time with positional changes    FRANNIE STEWART  [facility] HEART [facility]  [phone]  ______________________________________________________________________    Subjective:  CC: Abnormal stress test/cardiomyopathy    I had the opportunity to see Karen Arthur at the [facility]. Karen Arthur is a 84 y.o. female with a known history of cardiomyopathy (LVEF 35-65-back to 37% now), HTN, HL, NSVT, episodes of near-syncope, dementia who is here for w/u of an abnormal stress test, again worsened cardiomyopathy.     She has been worked up extensively for episodes of near syncope in '16-17, around which time her LVEF fluctuated from  "30's back to normal on guideline directed therapy for her HF w/ ARB/BB. A Nuc stress was reportedly normal, cardiac MRI revealed mid-layer inferobasilar scar consistent with prior viral myocarditis. As her LVEF recovered, she was managed conservatively, with pre-syncopal episodes reslving after discontinuation of rivastigmine patch.    Then, in early 1/20 she had another episode of pre-syncope with \"funny feeling in her mouth extending down to her stomach\" and feeling lightheaded, w/ SBP's in 150s, HR regular and 60's.  She then proceeded to lie down and slept for the next 3 hours, and felt normal after waking up with no recurrence of symptoms. Of note, she has recently had ARB dose up-titration a few days prior to this.    A Nuc stress was done demonstrating LVEF 38 w/ a small area of mild degree of infarction in mid anterior/anteroseptal segments but no ischemia, new LBBB on EKG. She presents today for evaluation of these findings.    She denies CP/SOB/WALTERS/orthopnea/PND/edema/syncope/falls/N/VD/F/C.  Per her , she does get more fatigued with doing laundry, needs more age.    Lives with her  in their house, no tobacco, minimal EtOH, retired mother of 5.   ______________________________________________________________________      Review of Systems:   As noted in HPI, all others reviewed and are negative      Problem List:  Patient Active Problem List   Diagnosis     Hypothyroidism     Chronic back pain     Hyperlipidemia     Osteoarthritis of both hands     Vitamin D deficiency     Gastritis     RLS (restless legs syndrome)     Osteoarthritis, hip, bilateral     Chronic constipation     Chronic anemia     Dementia (H)     Essential hypertension     NSVT (nonsustained ventricular tachycardia) (H)     GERD (gastroesophageal reflux disease)     Chronic right shoulder pain     Chronic fatigue     Osteoporosis     Hyponatremia     Scoliosis of lumbar spine, unspecified scoliosis type     Low back pain " radiating to both legs     Lumbar spinal stenosis     History of skin cancer     Near syncope     Medical History:  Past Medical History:   Diagnosis Date     Appetite loss 5/5/2017     Benign microscopic hematuria     neg workup     Chest pain 11/2016    Hospitalization with chest pain, normal pharmacologic nuclear stress test, GERD suspected     Chronic anemia 1/4/2016     Chronic back pain      Chronic constipation      Chronic fatigue 5/5/2017     Chronic right shoulder pain 4/4/2017     Dementia (H) 07/28/2016    SLUMS 20 out of 30 in July 2016 started Namenda earlier this year     Diverticulosis      Eczema of scalp      Gallstones      Gastritis 2015    NSAIDs     GERD (gastroesophageal reflux disease) 11/8/2016    Responsible for recurrent chest pain      Hip fracture (H) 2015    ORIF hip fracture 2015     History of skin cancer 12/30/2019     HTN (hypertension)      Hyperlipidemia      Hypothyroidism      Low back pain radiating to both legs 07/18/2019    MRI with severe central canal stenosis L3-L4     Lumbar radiculopathy     SHERIF 2014 L1-L2     Lumbar spinal stenosis     Low back pain with bilateral leg pain with MRI showing severe spinal stenosis L3-L4     Moderate protein-calorie malnutrition (H) 11/27/2017     Nausea and vomiting 11/27/2017    Recurrent, occurring monthly, hospitalization November 2017, GI evaluation including EGD suggesting esophageal dysmotility but no other abnormalities.  Questioning whether secondary to chronic constipation and now using MiraLAX.  Also holding Exelon patch, mildly elevated lipase of unclear significance March 2018     Near syncope 10/03/2016    Abnormal pharmacologic nuclear stress test with small area of infarction involving anterior wall with hypokinesis and decreased left ventricular systolic function.  Unclear if artifactual related to LBBB or recent MI     Nonischemic cardiomyopathy (H)     Echocardiogram November 2016 with ejection fraction 35% with global  moderate reduction in left ventricular systolic function, aortic sclerosis without significant aortic stenosis, however, cardiac MRI with ejection fraction 45% December 2016 which is more consistent with findings on nuclear stress test, follow-up echocardiogram July 2017 with improved ejection fraction of 50%     NSVT (nonsustained ventricular tachycardia) (H) 11/8/2016    Event monitor 2016     Occipital neuritis 2011     Osteoarthritis of both hands      Osteoarthritis, hip, bilateral      Osteoporosis     started Prolia 7/20/15 after hip fx Tscore -1.9, fourth injection given April 4, 2017, follow-up DEXA August 2017 T score -1.6 involving left hip suggesting improvement, -2.4 distal radius     Pulmonary nodules 01/05/2017    5 mm nodules bilateral apices, indeterminate but follow-up scan July 2017 unchanged.  Appeared to be benign and no further imaging required     RLS (restless legs syndrome)      Scoliosis of lumbar spine, unspecified scoliosis type 12/21/2018     Skin lesion of right ear 12/21/2018     Vitamin D deficiency      Surgical History:  Past Surgical History:   Procedure Laterality Date     BREAST LUMPECTOMY       CATARACT EXTRACTION, BILATERAL       CHOLECYSTECTOMY  2007     COLONOSCOPY  2014     DILATION AND CURETTAGE OF UTERUS       HEMORROIDECTOMY       LUMBAR LAMINECTOMY  2005    L3 - L4     ORIF HIP FRACTURE  2015     TONSILLECTOMY       Social History:  Social History     Socioeconomic History     Marital status:      Spouse name: Not on file     Number of children: 6     Years of education: Not on file     Highest education level: Not on file   Occupational History     Occupation: Homemaker   Social Needs     Financial resource strain: Not on file     Food insecurity:     Worry: Not on file     Inability: Not on file     Transportation needs:     Medical: Not on file     Non-medical: Not on file   Tobacco Use     Smoking status: Never Smoker     Smokeless tobacco: Never Used    Substance and Sexual Activity     Alcohol use: Yes     Alcohol/week: 1.0 standard drinks     Types: 1 Glasses of wine per week     Comment: glass of wine with dinner     Drug use: No     Sexual activity: Never   Lifestyle     Physical activity:     Days per week: Not on file     Minutes per session: Not on file     Stress: Not on file   Relationships     Social connections:     Talks on phone: Not on file     Gets together: Not on file     Attends Scientologist service: Not on file     Active member of club or organization: Not on file     Attends meetings of clubs or organizations: Not on file     Relationship status: Not on file     Intimate partner violence:     Fear of current or ex partner: Not on file     Emotionally abused: Not on file     Physically abused: Not on file     Forced sexual activity: Not on file   Other Topics Concern     Not on file   Social History Narrative     Not on file           Family History:  Family History   Problem Relation Age of Onset     Rheumatologic disease Mother      Heart attack Father      Sudden death Father 71     No Medical Problems Sister      No Medical Problems Brother      No Medical Problems Brother          Allergies:  Allergies   Allergen Reactions     Aricept [Donepezil]      Leg cramps       Exelon [Rivastigmine]      Nausea/vomiting     Lisinopril      Hypotension     Statins-Hmg-Coa Reductase Inhibitors Myalgia     Sulfa (Sulfonamide Antibiotics) Rash       Medications:  Current Outpatient Medications   Medication Sig Dispense Refill     acetaminophen (TYLENOL) 500 MG tablet Take 1,000 mg by mouth 3 (three) times a day.       aspirin 325 MG EC tablet Take 325 mg by mouth daily.       calcium carbonate-vitamin D3 (CALCIUM 600 + D,3,) 600 mg(1,500mg) -200 unit per tablet Take 1 tablet by mouth 2 (two) times a day.       carvedilol (COREG) 12.5 MG tablet Take 1 tablet (12.5 mg total) by mouth 2 (two) times a day. 180 tablet 3     diphenhydrAMINE (BENADRYL) 25 mg  "tablet Take 50 mg by mouth bedtime.       ferrous sulfate 325 (65 FE) MG tablet Take 1 tablet (325 mg total) by mouth 3 (three) times a week.  0     lansoprazole (PREVACID) 30 MG capsule Take 1 capsule (30 mg total) by mouth 2 (two) times a day. 180 capsule 2     levothyroxine (SYNTHROID, LEVOTHROID) 100 MCG tablet Take 1 tablet (100 mcg total) by mouth daily. 90 tablet 2     losartan (COZAAR) 100 MG tablet Take 1 tablet (100 mg total) by mouth daily. 90 tablet 3     memantine (NAMENDA) 10 MG tablet TAKE 1 TABLET BY MOUTH TWICE DAILY 180 tablet 1     nabumetone (RELAFEN) 750 MG tablet take 1 tablet by mouth twice daily. 114 tablet 2     polyethylene glycol (MIRALAX) 17 gram packet Take 17 g by mouth daily as needed.        TURMERIC ROOT EXTRACT ORAL Take 300 mg by mouth 2 (two) times a day.              VITAMIN D2 50,000 unit capsule TAKE 1 CAPSULE (50,000 UNITS TOTAL) BY MOUTH ONCE A WEEK. 12 capsule 2     Current Facility-Administered Medications   Medication Dose Route Frequency Provider Last Rate Last Dose     denosumab 60 mg (PROLIA 60 mg/ml)  60 mg Subcutaneous Q6 Months Kehinde Tate MD   60 mg at 07/26/19 1007       Objective:   Vital signs:  /70 (Patient Site: Right Arm, Patient Position: Sitting, Cuff Size: Adult Regular)   Pulse 68   Resp 20   Ht 5' 4\" (1.626 m)   Wt 123 lb (55.8 kg)   LMP  (LMP Unknown)   BMI 21.11 kg/m        Physical Exam:    GENERAL APPEARANCE: Alert, cooperative and in no acute distress.   HEENT: No scleral icterus. Oral mucuos membranes pink and moist.   NECK: JVP 6. No Hepatojugular reflux. Thyroid not visualized. No lymphadenopathy   CHEST: clear to auscultation   CARDIOVASCULAR: S1, S2 without murmur ,clicks or rubs. Brachial, radial and posterior tibial pulses are intact and symetric. No carotid bruits noted. No edema  ABDOMEN: Nontender. BS+. No bruits.   SKIN: No Xanthelasma   Musculoskeletal: No cyanosis, clubbing or swelling.      Lab " Results:  LIPIDS:  Lab Results   Component Value Date    CHOL 272 (H) 12/30/2019    CHOL 290 (H) 12/21/2018    CHOL 258 (H) 08/01/2017     Lab Results   Component Value Date    HDL 56 12/30/2019    HDL 66 12/21/2018    HDL 56 08/01/2017     Lab Results   Component Value Date    LDLCALC 179 (H) 12/30/2019    LDLCALC 190 (H) 12/21/2018    LDLCALC 169 (H) 08/01/2017     Lab Results   Component Value Date    TRIG 183 (H) 12/30/2019    TRIG 168 (H) 12/21/2018    TRIG 167 (H) 08/01/2017     No components found for: CHOLHDL    BMP:  Lab Results   Component Value Date    CREATININE 0.96 12/30/2019    BUN 24 12/30/2019     12/30/2019    K 4.5 12/30/2019     12/30/2019    CO2 24 12/30/2019         [facility]

## 2021-06-05 NOTE — TELEPHONE ENCOUNTER
Refill Approved    Rx renewed per Medication Renewal Policy. Medication was last renewed on 9/26/18.    Valentine Escobar, [facility] Connection Triage/Med Refill 1/26/2020     Requested Prescriptions   Pending Prescriptions Disp Refills     lansoprazole (PREVACID) 30 MG capsule [Pharmacy Med Name: Lansoprazole Oral Capsule Delayed Release 30 MG] 180 capsule 1     Sig: Take 1 capsule (30 mg total) by mouth 2 (two) times a day.       GI Medications Refill Protocol Passed - 1/25/2020  3:40 PM        Passed - PCP or prescribing provider visit in last 12 or next 3 months.     Last office visit with prescriber/PCP: 1/9/2020 Kehinde Tate MD OR same dept: 1/9/2020 Kehinde Tate MD OR same specialty: 1/9/2020 Kehinde Tate MD  Last physical: 12/30/2019 Last MTM visit: Visit date not found   Next visit within 3 mo: Visit date not found  Next physical within 3 mo: Visit date not found  Prescriber OR PCP: Kehinde Tate MD  Last diagnosis associated with med order: 1. Nausea  - lansoprazole (PREVACID) 30 MG capsule [Pharmacy Med Name: Lansoprazole Oral Capsule Delayed Release 30 MG]; Take 1 capsule (30 mg total) by mouth 2 (two) times a day.  Dispense: 180 capsule; Refill: 1    If protocol passes may refill for 12 months if within 3 months of last provider visit (or a total of 15 months).

## 2021-06-05 NOTE — PATIENT INSTRUCTIONS - HE
# Abnormal stress test with again worsened heart function and small area of anterior/anteroseptal scar   - it is unlikely that these findings are correlated with your episode of lightheadedness  - it seems that left bundle branch block is quite possibly explanation for your stress test findings, but since you have never had angiography, I think you would need one eventually - we have discussed CTA cor angio vs invasive angiography +/- stent and are leaning towards the latter  - since you are traveling to [place] next week, and can not follow with my recommendation of an urgent study and would prefer to do one upon return in 1 mos, let's at least obtain an echocardiogram now to confirm heart function and rule out significant valvular disease  - please, seek medical attention immediately for any chest pain, worsening shortness of breath, additional lightheadeness  - if you have no obstructive coronary disease (heart vessel blockages) and/or persistent heart dysfunction after any revascularization (I.e. stets), would consider utility of pacemaker  - in the meantime, continue ASA, carvedilol/losartan  - please, stay hydrated, allow time with positional changes

## 2021-06-05 NOTE — TELEPHONE ENCOUNTER
Central PA team  [phone]  Pool: HE PA MED ([id number])          PA has been initiated.       PA form completed and faxed insurance via Cover My Meds     Key:  [id number] - PA Case ID: [id number] - Rx #: [id number]     Medication:  Lansoprazole 30MG dr capsules    Insurance:  [facility] FEP        Response will be received via fax and may take up to 5-10 business days depending on plan

## 2021-06-05 NOTE — PROGRESS NOTES
"  Office Visit - Follow Up   Karen Arthur   84 y.o. female    Date of Visit: 1/9/2020    Chief Complaint   Patient presents with     Hypertension     spouse calls it an \"episode\", patient has a funny feeling from her taste buds down to her stomach and feels extremely run down and then sleeps for several hours, denies chest pain of loss of conciousness. Spouse checks BP during episode and it was in the 150's.        Assessment and Plan   1. Near syncope  Unclear etiology of recent episode sounding like near syncope although blood pressure readings obtained by  were reasonable with systolic in the 150s and heart rate 60s.  Recent labs looked good.  EKG is showing new left bundle branch block.  Cannot exclude coronary ischemia.  She does have some dyspnea with exertion.  Proceed with pharmacologic nuclear stress test.   - Electrocardiogram Perform and Read  - NM Pharmacologic Stress Test; Future    2. Essential hypertension  Losartan recently increased to 100 mg.  I am questioning whether this may have been a contributing factor to recent symptoms causing hypotension.  She had not had similar episode for nearly 2 years.  However, blood pressure was checked 10 minutes after episode began and while she was still symptomatic and systolic was 155.    Return in about 1 year (around 1/9/2021) for Annual physical.     History of Present Illness   This 84 y.o. old woman with hypertension, dementia, and osteoarthritis here after episode of near syncope that occurred earlier this week.  She has a history of recurrent near syncopal episodes where she develops generalized weakness with nausea followed by profound fatigue.  No chest pain or palpitations.  Extensive cardiac work-up in 2016 and 2017.  Symptoms finally resolved after discontinuing Exelon patch.  However, 2 days ago, she had recurrent symptoms.  Witnessed by her .  She developed funny feeling in her mouth extending down to her stomach and was feeling " lightheaded.  Denies any chest pain.  No dyspnea.  No nausea or emesis.  Her  checked her blood pressure and found systolic in the 150s.  Pulse regular and 60s.  She then proceeded to lie down and slept for the next 3 hours.  When she awoke she felt fine and has continued to feel normal over the last 2 days.  Her losartan dose was increased from 50 to 100 mg last week after her recent physical when she was found to have blood pressure not at goal.  Her last stress test was in 2016.  Echocardiogram completed at that time.    Review of Systems:  Otherwise, a comprehensive review of systems was negative except as noted.     Medications, Allergies and Problem List   Patient Active Problem List   Diagnosis     Hypothyroidism     Chronic back pain     Hyperlipidemia     Osteoarthritis of both hands     Vitamin D deficiency     Gastritis     RLS (restless legs syndrome)     Osteoarthritis, hip, bilateral     Chronic constipation     Chronic anemia     Dementia (H)     Near syncope     Essential hypertension     NSVT (nonsustained ventricular tachycardia) (H)     GERD (gastroesophageal reflux disease)     Chronic right shoulder pain     Chronic fatigue     Osteoporosis     Hyponatremia     Scoliosis of lumbar spine, unspecified scoliosis type     Low back pain radiating to both legs     Lumbar spinal stenosis     History of skin cancer       She has a past surgical history that includes Lumbar laminectomy (2005); Cataract extraction, bilateral; Cholecystectomy (2007); Hemorroidectomy; Dilation and curettage of uterus; Tonsillectomy; Colonoscopy (2014); ORIF hip fracture (2015); and Breast lumpectomy.    Allergies   Allergen Reactions     Aricept [Donepezil]      Leg cramps       Exelon [Rivastigmine]      Nausea/vomiting     Lisinopril      Hypotension     Statins-Hmg-Coa Reductase Inhibitors Myalgia     Sulfa (Sulfonamide Antibiotics) Rash       Current Outpatient Medications   Medication Sig Dispense Refill      "acetaminophen (TYLENOL) 500 MG tablet Take 1,000 mg by mouth 3 (three) times a day.       aspirin 325 MG EC tablet Take 325 mg by mouth daily.       carvedilol (COREG) 12.5 MG tablet Take 1 tablet (12.5 mg total) by mouth 2 (two) times a day. 180 tablet 3     diphenhydrAMINE (BENADRYL) 25 mg tablet Take 50 mg by mouth bedtime.       ferrous sulfate 325 (65 FE) MG tablet Take 1 tablet (325 mg total) by mouth 3 (three) times a week.  0     lansoprazole (PREVACID) 30 MG capsule Take 1 capsule (30 mg total) by mouth 2 (two) times a day. 180 capsule 2     levothyroxine (SYNTHROID, LEVOTHROID) 100 MCG tablet Take 1 tablet (100 mcg total) by mouth daily. 90 tablet 2     losartan (COZAAR) 100 MG tablet Take 1 tablet (100 mg total) by mouth daily. 90 tablet 3     memantine (NAMENDA) 10 MG tablet TAKE 1 TABLET BY MOUTH TWICE DAILY 180 tablet 1     nabumetone (RELAFEN) 750 MG tablet take 1 tablet by mouth twice daily. 114 tablet 2     polyethylene glycol (MIRALAX) 17 gram packet Take 17 g by mouth daily as needed.        TURMERIC ROOT EXTRACT ORAL Take 300 mg by mouth 2 (two) times a day.              VITAMIN D2 50,000 unit capsule TAKE 1 CAPSULE (50,000 UNITS TOTAL) BY MOUTH ONCE A WEEK. 12 capsule 2     Current Facility-Administered Medications   Medication Dose Route Frequency Provider Last Rate Last Dose     denosumab 60 mg (PROLIA 60 mg/ml)  60 mg Subcutaneous Q6 Months Kehinde Tate MD   60 mg at 07/26/19 1007        Physical Exam   General Appearance: Well-appearing pleasant elderly woman who is forgetful    /62   Pulse 69   Ht 5' 4\" (1.626 m)   Wt 122 lb (55.3 kg)   LMP  (LMP Unknown)   SpO2 100%   BMI 20.94 kg/m      Respiratory: Normal respiratory effort.  Lungs are clear with no rales or wheezes.  Heart: Regular rate and rhythm without murmurs, rubs, or gallops.    Extremities: No peripheral edema.  Neurologic: Grossly nonfocal           Additional Information   Social History     Tobacco Use     " Smoking status: Never Smoker     Smokeless tobacco: Never Used   Substance Use Topics     Alcohol use: Yes     Alcohol/week: 1.0 standard drinks     Types: 1 Glasses of wine per week     Comment: glass of wine with dinner     Drug use: No           Review and/or order of radiology tests: Ordering pharmacologic nuclear stress test    Independent visualization of image, tracing or specimen itself: I personally reviewed and interpreted her EKG showing normal sinus rhythm and new left bundle branch block not previously seen         Kehinde Tate MD

## 2021-06-06 NOTE — TELEPHONE ENCOUNTER
Who is calling:  Nayely Zimmer From [facility]    Reason for Call:  Caller states Due to high volume of referrals delaying start of Physical Therapy and Occupational Therapy  . Patient is discharged form hospital . For questions please reach out Home Care nurse Nayely  Phone [phone].  Date of last appointment with primary care: Unknown   Okay to leave a detailed message: Yes

## 2021-06-06 NOTE — TELEPHONE ENCOUNTER
Dr Tate put in an order for her prolia. Please check with her insurance and let us know coverage for 2020. Patient would like to get shot soon.     Thank you  Carina Mckoy CMA

## 2021-06-06 NOTE — TELEPHONE ENCOUNTER
Karen Arthur  [address]  [facility] [address]  [phone] (home)     Procedure cardiologist:  Dr. Hickman  PCP:  Kehinde Tate MD  H&P completed by:  1/24/2020  Admit date 2/13  Arrival time:  0930  Anticoagulation: None  CPAP: No  Previous PCI: None  Bypass Grafts: No  Renal Issues: No  Diabetic?: No  Device?: No      Angiogram Teaching    Reason for Visit:  Telephone call to discuss pre-procedure education in preparation for: Coronary angiogram possible PCI    Procedure Prep:  Primary Cardiologist note dated: 1/24  EKG results obtained, dated: Morning of procedure  Hemogram results obtained: Morning of procedure  Basic Metabolic Panel results obtained: Morning of procedure  Lipid Profile results obtained: 12/30/19    Patient Education  Patient states understanding of procedure and risks and agrees to proceed.    Pre-procedure instructions  Patient instructed to be NPO after midnight.  Patient instructed to shower the evening before or the morning of the procedure.  Patient instructed to arrange for transportation home following procedure from a responsible family member of friend. No driving for at least 24 hours.  Patient understands to have their  () at the hospital by 9am the next day for discharge instructions and discharge ride.  Patient instructed to have a responsible adult with them for 24 hours post-procedure.  Post-procedure follow up process.  Conscious sedation discussed.    Pre-procedure medication instructions  Patient instructed on antiplatelet medication.  Continue medications as scheduled, with a small amount of water on the day of the procedure unless indicated.  Patient instructed to take 325 mg of Aspirin am of procedure: Yes  Other medication: instructed to only take aspirin, carvedilol, levothyroxine a.m. of the procedure.    *PATIENTS RECORDS AVAILABLE IN [employer] UNLESS OTHERWISE INDICATED*      Patient Active Problem List   Diagnosis     Hypothyroidism     Chronic  back pain     Hyperlipidemia     Osteoarthritis of both hands     Vitamin D deficiency     Gastritis     RLS (restless legs syndrome)     Osteoarthritis, hip, bilateral     Chronic constipation     Chronic anemia     Dementia (H)     Essential hypertension     NSVT (nonsustained ventricular tachycardia) (H)     GERD (gastroesophageal reflux disease)     Chronic right shoulder pain     Chronic fatigue     Osteoporosis     Hyponatremia     Scoliosis of lumbar spine, unspecified scoliosis type     Low back pain radiating to both legs     Lumbar spinal stenosis     History of skin cancer     Near syncope     Abnormal cardiovascular stress test     Decreased left ventricular function       Current Outpatient Medications   Medication Sig Dispense Refill     acetaminophen (TYLENOL) 500 MG tablet Take 1,000 mg by mouth 3 (three) times a day.       aspirin 325 MG EC tablet Take 325 mg by mouth daily.       calcium carbonate-vitamin D3 (CALCIUM 600 + D,3,) 600 mg(1,500mg) -200 unit per tablet Take 1 tablet by mouth 2 (two) times a day.       carvedilol (COREG) 12.5 MG tablet Take 1 tablet (12.5 mg total) by mouth 2 (two) times a day. 180 tablet 3     diphenhydrAMINE (BENADRYL) 25 mg tablet Take 50 mg by mouth bedtime.       ferrous sulfate 325 (65 FE) MG tablet Take 1 tablet (325 mg total) by mouth 3 (three) times a week.  0     lansoprazole (PREVACID) 30 MG capsule Take 1 capsule (30 mg total) by mouth 2 (two) times a day. 180 capsule 3     levothyroxine (SYNTHROID, LEVOTHROID) 100 MCG tablet Take 1 tablet (100 mcg total) by mouth daily. 90 tablet 2     losartan (COZAAR) 100 MG tablet Take 1 tablet (100 mg total) by mouth daily. 90 tablet 3     memantine (NAMENDA) 10 MG tablet TAKE 1 TABLET BY MOUTH TWICE DAILY 180 tablet 1     nabumetone (RELAFEN) 750 MG tablet take 1 tablet by mouth twice daily. 114 tablet 2     polyethylene glycol (MIRALAX) 17 gram packet Take 17 g by mouth daily as needed.        TURMERIC ROOT EXTRACT  ORAL Take 300 mg by mouth 2 (two) times a day.              VITAMIN D2 50,000 unit capsule TAKE 1 CAPSULE (50,000 UNITS TOTAL) BY MOUTH ONCE A WEEK. 12 capsule 2     Current Facility-Administered Medications   Medication Dose Route Frequency Provider Last Rate Last Dose     denosumab 60 mg (PROLIA 60 mg/ml)  60 mg Subcutaneous Q6 Months Kehinde Tate MD   60 mg at 07/26/19 1007       Allergies   Allergen Reactions     Aricept [Donepezil]      Leg cramps       Exelon [Rivastigmine]      Nausea/vomiting     Lisinopril      Hypotension     Statins-Hmg-Coa Reductase Inhibitors Myalgia     Sulfa (Sulfonamide Antibiotics) Rash         RN Shanell Levine

## 2021-06-06 NOTE — PROGRESS NOTES
The following steps were completed to comply with the REMS program for Prolia:  1. Ordering provider has previously reviewed information in the Medication Guide and Patient Counseling Chart, including the serious risks of Prolia  and the symptoms of each risk and have been advised to seek prompt medical attention if they have signs or symptoms of any of the serious risks.  2. Provided each patient a copy of the Medication Guide and Patient Brochure.  See MAR for administration details.   Indication: Prolia  (denosumab) is a prescription medicine used to treat osteoporosis in patients who:   Are at high risk for fracture, meaning patients who have had a fracture related to osteoporosis, or who have multiple risk factors for fracture; Cannot use another osteoporosis medicine or other osteoporosis medicines did not work well.   The timeline for early/late injections would be 4 weeks early and any time after the 6 month jonny. If a patient receives their injection late, then the subsequent injection would be 6 months from the date that they actually received the injection    Have the screening questions been asked prior to this administration? Yes     Mrs. Arthur arrived at clinic for Prolia injection given Subcutaneous at Left arm.      Injection was given without incidence.      Moni KENNEY CMA/ILA....................3:47 PM

## 2021-06-06 NOTE — TELEPHONE ENCOUNTER
From: Yan Hickman MD  Sent: 3/5/2020   6:33 PM CST  To: Shanell Levine RN, Alfa Heath MD    Thank you Alfa!    Yan  ----- Message -----  From: Alfa Heath MD  Sent: 3/5/2020   4:45 PM CST  To: Shanell Levine RN, Yan Hickman MD    Agree, you can continue to watch her QRS duration and proceed with the CRT system implantation if needed.  Thank you

## 2021-06-06 NOTE — TELEPHONE ENCOUNTER
Called pt and discussed holter results and recommendations with both the pt and pts . Patient verbalizes understanding and agrees with plan. No further questions or concerns at this time.    Pt transferred to scheduling.

## 2021-06-06 NOTE — TELEPHONE ENCOUNTER
Spoke with the patient and relayed message below from Dr. Tate.  She verbalized understanding and had no further questions at this time.  Moni KENNEY, ARTEMIO/CMT....................11:10 AM

## 2021-06-06 NOTE — TELEPHONE ENCOUNTER
Orders being requested: Ok for Occupational Therapy Revisit for next week   Reason service is needed/diagnosis: Arm Exercise   When are orders needed by: As soon as possible   Where to send Orders: Phone:  [phone]  Okay to leave detailed message?  Yes

## 2021-06-06 NOTE — TELEPHONE ENCOUNTER
----- Message from Yan Hickman MD sent at 2/13/2020  1:42 PM CST -----  Edriss,    Theme of the day - this lady is a patient I saw in clinic, an 84F w/ cardiomyopathy (LVEF 35-65-back to 37% now), HTN, HL, h/o NSVT, episodes of near-syncope, dementia who had an abnormal stress test (aneterior/septal infarct?), again worsened cardiomyopathy, pre-syncope.    We confirmed her LVEF was down by TTE, so proceeded w/ angio today, which showed moderate disease in dLAD and mRCA - assessed w/ FFR for LAD and negative, PD/PA 1, so no need for FFR in RCA, normal LVEDP.    This suggests a possibility of LBBB as the culprit for her cardiomyopathy; PVC's may be another (albeit less likely) explanation. She is on good doses of carvedilol/losartan already but will add spironolactone, get a Holter study to assess PVC burden.    She doesn't have the most impressive EKG, but what would be your take on her potential candidacy fort CRT-D?    Shanell, she has statin intolerance, but concerning plaque appearance, so will start rosuva 10 and plan to check LFT's/CK in 6-8 weeks. In the meantime let's work aggressively on increasing that dose as tolerated - she is to call us bi-weekly and if tolerating, would plan to go up by 10mg increments.    Thank you both!    Yan

## 2021-06-06 NOTE — PROGRESS NOTES
MTM Transition of Care Encounter  Assessment & Plan                                                     Post Discharge Medication Reconciliation Status: discharge medications reconciled, continue medications without change    Acute CVA: Stable - No increasing numbness or new symptoms of stroke noted and patient taking medicines as directed. Following PT/OT for left arm numbness due to stroke.    Alzheimer's dementia: Needs improvement - Patient's taste in food and appetite differ day to day which is consistent with this type of dementia progression. Patient is taking an anticholinergic medication (diphenhydramine) for help with insomnia which is generally not recommended for those with dementia as it can increase cognitive decline and confusion. Recommended talking with PCP about sleep aide options. Consider starting patient on low dose of the antidepressant mirtazapine (7.5 - 15 mg) nightly as this helps with sleep and also can help stimulate appetite.  PLAN:  1. Consider substituting nightly Benadryl with mirtazapine to help with sleep and appetite    Hypertension/CAD/Cardiomyopathy: Needs improvement  - Consider titrating rosuvastatin to 10 mg daily at follow up appointment on 2/27/2020 as long as patient isn't experiencing any myalgias. Per cardiology notes from inpatient stay the plan is to slowly increase dose of rosuvastatin to 40 mg daily for maximum CV event prophylaxis. BP is at goal according to ACC/AHA guidelines of <130/80 mmHg with no issues with hypotension. Continue to monitor electrolytes with addition of spironolactone recently.  PLAN:  1. Consider increasing to rosuvastatin 10 mg daily at follow up, if no myalgias    GERD: Needs improvement - Patient's daughter mentioned that patient was being started on acetylcholinesterase inhibitors (AChEI's) at the time that she was started on the current PPI therapy. While patient was being initiated on the AChEI's she was found to have issues with nausea and  vomiting that was relieved once she switched to memantine. It's possible that the patient was experiencing GERD like symptoms that were actually being caused by these other medications. Due to the patient's comorbidity of osteoporosis and the negative effects PPI's have on bone health long term consider tapering patient off of lansoprazole in the future. Consider checking Mg levels due to chronic PPI use and correlation to muscle cramps/muscle pain.  PLAN:  1. Consider tapering off of lansoprazole starting with decreasing to once daily dosing  2. Consider checking Mg levels due to chronic PPI use    Osteoporosis: Needs improvement -  Patient is due for another DEXA scan as the last one was in 2017 and her last dose of Prolia was this month. Getting adequate vitamin D and calcium supplementation.  PLAN:  1. Consider follow up DEXA scan    Pain: Stable - Pain currently well controlled. Patient interested in restarting nabumetone once off of Plavix in around 3 weeks time as her pain was well managed previously with this and her other pain medications. We also discussed topical NSAIDS like Voltaren gel as an option if nabumetone isn't an option in the future.  PLAN:  1. Consider restarting nabumetone after Plavix therapy is complete    Chronic anemia: Stable - Patient taking medication as prescribed with intermittent constipation that could be due to supplementation.    Hypothyroidism: Stable - Patient taking medication as prescribed.    Restless leg syndrome: Stable - Patient has symptoms of RLS around 1 time a month that causes her to not be able to sleep. Discussed keeping a journal of how often this happens to the patient to see if the patient wants to start on a medication to treat these symptoms. Patient not interested in medication for this at this time.    General health: Stable - Constipation is well managed with current bowel regimen and can continue eye supplement.    Follow Up  Return in about 1 week (around  2/25/2020) for with PCP.    Subjective & Objective                                                       Karen Arthur is a 84 y.o. female, accompanied by daughter and , called for a transitions of care visit. she was discharged from [facility] on 2/15/2020 for acute cerebrovascular accident.    Chief Complaint: Long term management of pain plan and statin titration plan    Medication Adherence/Access: Misses a few doses of lansoprazole a week due to before dinner dosing,  (Diallo) and daughter (Elodia?) help manage and administer medications.    Acute CVA: Underwent coronary angiogram on 2/13/20 and discharged to home only to experience progressive left arm weakness. MRI on 2/14/20 showed multiple infarcts in right frontal lobe, right parietal lobe, and left occipital lobe; cause was thought to be secondary to angiogram. Started on dual anti-platelet therapy of clopidogrel 75 mg daily and aspirin 81 mg EC daily for 3 weeks overlap. Still has left arm weakness but no other weakness issues/symptoms since discharge.     Alzheimer's dementia: Taking memantine 10 mg twice daily with no changes in cognition and no GI side effects noted.  reported that she had a negative reaction to the Exelon patch with skin irritation and also had a negative reaction to donepezil with nausea/vomiting.  is also wondering if a medication Karen is taking might cause changes in her taste as some days she loves a certain food and the next day she hates that food. He said this could be related to appetite changes day to day but still thinks it might be taste related. Has history of not being able to sleep through the night and not able to fall alseep so she takes diphenhydramine 50 mg daily at bedtime for help with sleep, which she reports works great.    Hypertension/CAD/Cardiomyopathy: Started on rosuvastatin 5 mg daily and plan to slowly increase to 40 mg daily as tolerated by patient. Daughter  was wondering what the titration schedule was for rosuvastatin and when to suggest increasing the dose as the directions didn't detail when to increase the dose. She was also just started on spironolactone 25 mg daily with no adverse effects reported. She's also been taking carvedilol 12.5 mg twice daily and losartan 50 mg daily at bedtime with no reports of dizziness or lightheadedness suggesting hypotension.  takes patients blood pressure at least 1 time a week and recently, since discharge, her blood pressure has been lower at 110/80 mmHg when her usual SBP is 125 mmHg.   Results for orders placed or performed during the hospital encounter of 02/14/20   Basic Metabolic Panel   Result Value Ref Range    Sodium 140 136 - 145 mmol/L    Potassium 4.4 3.5 - 5.0 mmol/L    Chloride 104 98 - 107 mmol/L    CO2 28 22 - 31 mmol/L    Anion Gap, Calculation 8 5 - 18 mmol/L    Glucose 80 70 - 125 mg/dL    Calcium 9.5 8.5 - 10.5 mg/dL    BUN 23 8 - 28 mg/dL    Creatinine 0.98 0.60 - 1.10 mg/dL    GFR MDRD Af Amer >60 >60 mL/min/1.73m2    GFR MDRD Non Af Amer 54 (L) >60 mL/min/1.73m2     Lab Results   Component Value Date    CHOL 272 (H) 12/30/2019    CHOL 290 (H) 12/21/2018    CHOL 258 (H) 08/01/2017     Lab Results   Component Value Date    HDL 56 12/30/2019    HDL 66 12/21/2018    HDL 56 08/01/2017     Lab Results   Component Value Date    LDLCALC 179 (H) 12/30/2019    LDLCALC 190 (H) 12/21/2018    LDLCALC 169 (H) 08/01/2017     Lab Results   Component Value Date    TRIG 183 (H) 12/30/2019    TRIG 168 (H) 12/21/2018    TRIG 167 (H) 08/01/2017     BP Readings from Last 3 Encounters:   02/15/20 146/77   02/13/20 119/65   01/27/20 110/70     Pulse Readings from Last 3 Encounters:   02/15/20 75   02/13/20 76   01/27/20 68     GERD: Taking lansoprazole 30 mg twice daily but sometimes misses 2nd dose before dinner. Was increased to twice daily dosing due to ED visit in 2016 where patient had chest pain/heart burn symptoms  that weren't managed by once daily Prilosec. However, daughter remembers around this time patient was being started on AChEI's like donepezil and the Exelon patch which also were causing nausea and stomach issues so she doesn't know if Karen needs this medicine.     Osteoporosis: Patient is getting Prolia shots once every 6 months along with taking vitamin D2 50,000 units once weekly, and calcium 600 + D3 200 IU twice daily.  Vitamin D, Total (25-Hydroxy)   Date Value Ref Range Status   12/30/2019 63.9 30.0 - 80.0 ng/mL Final     Pain: Patient has occasional back pain due to scoliosis and this is why she was on nabumetone, which was recently discontinued due to bleed risk with dual anti-platelet therapy. Daughter expressed concern about long term pain control as pain was well managed with nabumetone along with 1000 mg of Tylenol 3 times a day, and Tumeric 300 mg capsules twice daily. Patient isn't experiencing pain today but still has severe scoliosis and back pain.    Chronic anemia: Taking ferrous sulfate 325 mg three times a week. No complaints with this.  Lab Results   Component Value Date    WBC 8.4 02/14/2020    HGB 13.0 02/14/2020    HCT 40.7 02/14/2020     02/14/2020     02/14/2020     Hypothyroidism: Taking levothyroxine 100 mcg daily in the morning, no side effects noted and no symptoms of hypothyroidism either. Patient's daughter states Karen has always run cold and that it's hard to assess fatigue related to hypothyroidism right now because she's tired from being in the hospital.  Lab Results   Component Value Date    TSH 0.93 12/30/2019     Restless leg syndrome: Patient used to have issues with RLS waking her up often causing her to pace, and pour cold water on her legs to calm them down so she could sleep. Patient thinks it's not happening as often anymore but daughter wants to know if this gets worse if there's treatment for RLS. Thinks this only happens once a month at most  recently.    General health: She also take Miralax 17 g daily as needed when constipated which is only around a couple times a week. Her eye doctor recommended taking 2 tablets of AREDS eye supplement daily to prevent macular degeneration.    PMH: reviewed in EPIC   Allergies/ADRs: reviewed in EPIC   Alcohol: None  Tobacco:   Social History     Tobacco Use   Smoking Status Never Smoker   Smokeless Tobacco Never Used     Recent Vitals:   BP Readings from Last 3 Encounters:   02/15/20 146/77   02/13/20 119/65   01/27/20 110/70      Wt Readings from Last 3 Encounters:   02/14/20 119 lb 6 oz (54.1 kg)   02/13/20 120 lb (54.4 kg)   01/27/20 123 lb (55.8 kg)     ----------------    The patient declined an after visit summary    I spent 30 minutes with this patient today;  . All changes were made via collaborative practice agreement with Kehinde Tate MD. A copy of the visit note was provided to the patient's provider.     Keyla Marcano  4th Year PharmD APPE Student    I spent 100% of the time in direct supervision of the PharmD IV student. I have reviewed the note and agree with the assessment/plan as it is now written.   Norberto Ochoa, PharmD, BCACP  Medication Management (MTM) Pharmacist  [facility]      Current Outpatient Medications   Medication Sig Dispense Refill     acetaminophen (TYLENOL) 500 MG tablet Take 1,000 mg by mouth 3 (three) times a day.       aspirin 81 MG EC tablet Take 1 tablet (81 mg total) by mouth daily.  0     calcium carbonate-vitamin D3 (CALCIUM 600 + D,3,) 600 mg(1,500mg) -200 unit per tablet Take 1 tablet by mouth 2 (two) times a day.       carvedilol (COREG) 12.5 MG tablet Take 1 tablet (12.5 mg total) by mouth 2 (two) times a day. 180 tablet 3     clopidogreL (PLAVIX) 75 mg tablet Take 1 tablet (75 mg total) by mouth daily. 21 tablet 0     diphenhydrAMINE (BENADRYL) 25 mg tablet Take 50 mg by mouth bedtime.       ferrous sulfate 325 (65 FE) MG  tablet Take 1 tablet (325 mg total) by mouth 3 (three) times a week. (Patient taking differently: Take 1 tablet by mouth 3 (three) times a week. MWF)  0     lansoprazole (PREVACID) 30 MG capsule Take 1 capsule (30 mg total) by mouth 2 (two) times a day. 180 capsule 3     levothyroxine (SYNTHROID, LEVOTHROID) 100 MCG tablet Take 1 tablet (100 mcg total) by mouth daily. 90 tablet 2     losartan (COZAAR) 50 MG tablet Take 50 mg by mouth at bedtime.       memantine (NAMENDA) 10 MG tablet TAKE 1 TABLET BY MOUTH TWICE DAILY 180 tablet 1     polyethylene glycol (MIRALAX) 17 gram packet Take 17 g by mouth daily as needed.        rosuvastatin (CRESTOR) 5 MG tablet Take 1 tablet (5 mg total) by mouth at bedtime. 30 tablet 12     spironolactone (ALDACTONE) 25 MG tablet Take 1 tablet (25 mg total) by mouth daily. 30 tablet 12     TURMERIC ROOT EXTRACT ORAL Take 300 mg by mouth 2 (two) times a day. Noon and Evening       vit A/vit C/vit E/zinc/copper (ICAPS AREDS ORAL) Take 1 capsule by mouth 2 (two) times a day. Noon and Evening       VITAMIN D2 50,000 unit capsule TAKE 1 CAPSULE (50,000 UNITS TOTAL) BY MOUTH ONCE A WEEK. 12 capsule 2     Current Facility-Administered Medications   Medication Dose Route Frequency Provider Last Rate Last Dose     denosumab 60 mg (PROLIA 60 mg/ml)  60 mg Subcutaneous Q6 Months Kehinde Tate MD   60 mg at 07/26/19 1007

## 2021-06-06 NOTE — TELEPHONE ENCOUNTER
"Prolia Injection Phone Screen      Screening questions have been asked 2-3 days prior to administration visit for Prolia. If any questions are answered with \"Yes,\" this phone encounter were will routed to ordering provider for further evaluation.     1.  When was the last injection?  07/26/19    2.  Has insurance for this injection been verified?  Yes    3.  Did you experience any new onset achiness or rashes that lasted for over a month with your previous Prolia injection?   No    4.  Do you have a fever over 101?F or a new deep cough that is unusual for you today? No    5.  Have you started any new medications in the last 6 months that you were told could affect your immune system? These may have been prescribed by oncologist, transplant, rheumatology, or dermatology.   No    6.  In the last 6 months have you have gastric bypass or parathyroid surgery?   No    7.  Do you plan dental work requiring drilling into the bone such as implants/extractions or oral surgery in the next 2-3 months?   No    Patient informed if symptoms discussed above present prior to their administration appointment, they are to notify clinic immediately.     Shell Eason            "

## 2021-06-06 NOTE — TELEPHONE ENCOUNTER
Left detailed message for Harriet with  home care relaying message below from Dr. Tate regarding requested orders.  Asked that she call with any further questions.  Moni KENNEY CMA/ILA....................1:26 PM    
Orders being requested: occupational therapy- 2 times a week for 2 weeks  Reason service is needed/diagnosis: stroke  When are orders needed by: asap  Where to send Orders: Phone:  [phone]  Okay to leave detailed message?  Yes      
Please give verbal order for occupational therapy with home care as requested  
Preston Worley for orders requested?  Moni KENNEY, CMA/CMT....................12:03 PM    
normal...

## 2021-06-06 NOTE — TELEPHONE ENCOUNTER
"No blood should be drawn at this \"lab appointment\".  Future labs are for 2 months from now.  She is in need of her Prolia injection.  Did this get prior authorization completed?  "

## 2021-06-06 NOTE — TELEPHONE ENCOUNTER
Please give verbal order for home care as requested.  I am okay her taking both the calcium with vitamin D as prescribed along with the 50,000 units of vitamin D.  As for the statin, benefits outweigh risks and I would like her to continue rosuvastatin in the setting of recent CVA.

## 2021-06-06 NOTE — TELEPHONE ENCOUNTER
"Orders being requested: For Physical Therapy   Reason service is needed/diagnosis:     1.Seen at [facility] on 2/14, 15 for CVA. Main dx is left arm weakness.  Twice a week for 2 weeks to begin on Monday 2/24, address gait, transfer, balance, and strength training as well as fall risk reduction.     2.Also, Occupational evaluation and treatment needed: OT for left hand weakness, and arm weakness, bathroom safety.     3.Skilled nursing evaluation and treament also needed: Pressure ulcer on right foot and education regarding nutritcian. ASAP  When are orders needed by: ASAP, because wants to do the evaluation ASAP.    4.HASEEB Caller said his system also alerted him to one allergen and one duplication of medication. The duplication of medication is Vitamin D2, 50,000 unit cap once a week and the other med is a calcium 600 mg and vitamin D3 600 mg 200 unit. \"Needs an order to check it off on his system\". Or for patient to stop taking it.     5. She has an old history of an allergy for a statin. She is currently on a medication Rouvastatin dosage 5 mg once a day, it has been alerted to be an allergen in her past medical history. But no complaints about allergic reaction. Is is okay to proceed with this medication?      Where to send Orders: Verbal per protocol for all but the medication dupe.  Okay to leave detailed message?  Yes    "

## 2021-06-06 NOTE — TELEPHONE ENCOUNTER
Patient underwent uneventful angiography yesterday.  I saw her  in an outpatient appointment today, who tells me she is having some issues with L hand fine movement, possibly gait.    While she had no overt issues with the procedure yesterday, a CVA including bleed is always on the differential, and I have recommended that she be evaluated in the ED for the above today, with possible head imaging/Neurology evaluation depending on findings at presentation.    Yan Hickman MD  Interventional Cardiology

## 2021-06-06 NOTE — PATIENT INSTRUCTIONS - HE
Karen Arthur,    It was a pleasure to see you today at the [facility].     My recommendations after this visit include:  - No changes to your medications.    - Continue to monitor for signs of retaining fluid (increasing weights, shortness of breath, swelling) and call with any concerns.   - See Dr. Hickman in May   - Please call my nurse Valencia if you have any concerns: [phone]    Chanel King, CNP

## 2021-06-06 NOTE — TELEPHONE ENCOUNTER
Left detailed message for Jane with [facility] relaying message below from Dr. Corcoran regarding requested orders below.  Asked that she call with any further questions.  Moni KENNEY CMA/ILA....................2:26 PM

## 2021-06-06 NOTE — TELEPHONE ENCOUNTER
Karen has a lab appointment next week.  The schedule indicates the labs are for Dr. Tate, but the only orders are from a different doctor.  CK and Hepatic.  Do there need to be other orders?

## 2021-06-06 NOTE — TELEPHONE ENCOUNTER
----- Message from Dawna Block sent at 1/31/2020 10:09 AM CST -----  peg GENTILE PCI with Dr Hickman - 2/13, 930am admit  H&P - 1/24      Thanks

## 2021-06-06 NOTE — TELEPHONE ENCOUNTER
----- Message from Yan Hickman MD sent at 3/5/2020  1:01 PM CST -----  Shanell,      I would set her up in HF clinic regardless. As for the PVC burden, LBBB, and pronounced 1st degree AVB, in my mind since her LVEF is ~40 again, I would favor medical management at this time.    Alfa, would you agree?    Thx!  Yan  ----- Message -----  From: Shanell Levine RN  Sent: 3/5/2020  11:42 AM CST  To: MD Dr. Vick Cortez,    Patient had holter this week. Can you please review? After you respond, I will call the pt and discuss rec's which include setting up in HF clinic.    Thanks,  Shanell  ----- Message -----  From: Yan Hickman MD  Sent: 2/27/2020   7:55 AM CST  To: Shanell Levine RN, Alfa Heath MD    Thank you Alfa,    This is what I figured as well. Right now her EF improved to 40%, which seems to be around baseline for her, so no real need for ICD.   IF no significant PVC burden on Holter, would favor having her f/u in HF clinic for med titration.     Shanell,  Could we set her up to see them?    Thx!  Yan  ----- Message -----  From: Alfa Heath MD  Sent: 2/26/2020   4:03 PM CST  To: Shanell Levine RN, MD Al Cortez,     Despite a significant IVCD, Karen's QRS duration remains less than 130 ms, therefore she would be unlikely to benefit from left ventricular pacing.  If permanent pacing or ICD becomes indicated, we can consider implantation of a basal septal/bundle of His lead.   Remain off I can do anything to assist in her management.  Thank you very much  ----- Message -----  From: Yan Hickman MD  Sent: 2/13/2020   1:42 PM CST  To: Shanell Levine RN, MD Alfa Maynard,    Theme of the day - this lady is a patient I saw in clinic, an 84F w/ cardiomyopathy (LVEF 35-65-back to 37% now), HTN, HL, h/o NSVT, episodes of near-syncope, dementia who had an abnormal stress test (aneterior/septal infarct?), again worsened cardiomyopathy,  pre-syncope.    We confirmed her LVEF was down by TTE, so proceeded w/ angio today, which showed moderate disease in dLAD and mRCA - assessed w/ FFR for LAD and negative, PD/PA 1, so no need for FFR in RCA, normal LVEDP.    This suggests a possibility of LBBB as the culprit for her cardiomyopathy; PVC's may be another (albeit less likely) explanation. She is on good doses of carvedilol/losartan already but will add spironolactone, get a Holter study to assess PVC burden.    She doesn't have the most impressive EKG, but what would be your take on her potential candidacy fort CRT-D?    Shanell, she has statin intolerance, but concerning plaque appearance, so will start rosuva 10 and plan to check LFT's/CK in 6-8 weeks. In the meantime let's work aggressively on increasing that dose as tolerated - she is to call us bi-weekly and if tolerating, would plan to go up by 10mg increments.    Thank you both!    Yan

## 2021-06-06 NOTE — PROGRESS NOTES
Office Visit - Follow Up   Karen Arthur   84 y.o. female    Date of Visit: 2/27/2020    Chief Complaint   Patient presents with     Hospital Visit Follow Up        Assessment and Plan   1. Acute CVA (cerebrovascular accident) (H)  Acute CVA with left arm weakness following coronary angiogram.  MRI with multiple acute/subacute infarcts involving the mid and posterior right frontal lobe, right parietal lobe and left occipital lobe.  She is to complete 3 weeks of Plavix and then resume aspirin 325 mg daily.  Currently only on 81 mg of aspirin.  Started on rosuvastatin and will try increasing dose to 10 mg daily.  In 2 months we will see if we can push dose to 20 mg depending on tolerance.  Historically she was not able to tolerate statins.  - rosuvastatin (CRESTOR) 10 MG tablet; Take 1 tablet (10 mg total) by mouth at bedtime.  Dispense: 30 tablet; Refill: 11    2. Late onset Alzheimer's disease without behavioral disturbance (H)  Continues Namenda.  Aricept and Exelon were not tolerated.    3. Cardiomyopathy, nonischemic (H)  Echocardiogram with decreased left ventricular function with global hypokinesis.  Underwent coronary angiogram showing no significant stenosis.  Questioning whether left bundle branch block could be contributing factor or whether she is experiencing frequent PVCs.  Cardiology has recommended 24-hour Holter monitor and will get this completed.  Her follow-up echocardiogram during hospitalization did suggest improved left ventricular systolic function of 40%.  She is now on spironolactone and continues on losartan and carvedilol.  We will make arrangements for follow-up with cardiology in 3 months.  I would have great reluctance for her to undergo further interventions such as placement of ICD.  I would pursue ongoing medical management.  - Holter Monitor; Future  - Ambulatory referral to Cardiology    4. Essential hypertension  Blood pressure looks reasonably well controlled.  Occasional  readings are low at home and family will continue to monitor.  May need to cut back on spironolactone if this continues    5. Chronic bilateral low back pain with bilateral sciatica  She will restart Relafen and following 21-day course of Plavix.  Currently using Tylenol as needed.    6. Chronic insomnia  I agree with pharmacist recommendation to discontinue Benadryl given potential side effects and woman who already has cognitive problems.  Will try melatonin.  Alternatively could try 7.5 mg of Remeron  - melatonin 3 mg cap; Take 3 mg by mouth at bedtime.; Refill: 0    7. Age-related osteoporosis without current pathological fracture  She is due for 10th and final dose of Prolia which hopefully can get completed next week.  We will plan for DEXA this spring or summer.  This can be scheduled at her follow-up appointment in 2 months    8. Right foot pain  She has developed a sore on the outside of her right foot.  I agree with family finding her wider shoes.  If this does not take care of the problem, I can refer her to Dr. King podiatrist in [place]    9. Encounter for therapeutic drug monitoring  We will recheck electrolytes and renal function with new medication spironolactone  - Basic Metabolic Panel    She will need LFTs and CPK drawn in 2 months when she returns    Return in about 2 months (around 4/27/2020) for Recheck.     History of Present Illness   This 84 y.o. old woman with dementia, hypertension, and chronic low back pain recent coronary angiogram following abnormal stress test and near syncopal episodes.  Found to have moderate disease.  Subsequently developed left arm weakness and was found to have acute/subacute CVA.  Discharged home with home health care and home physical and Occupational Therapy.  She is recovering from stroke nicely.  Completing 21 days of Plavix.  Rosuvastatin 5 mg started.  Spironolactone added for nonischemic cardiomyopathy and she continues on losartan and carvedilol.  ICD  was discussed.  24-hour Holter to work-up frequent PVCs recommended.  Blood pressure being monitored at home and generally well controlled although occasionally low with systolic around 100.  She is asymptomatic.  Tolerating rosuvastatin 5 mg so far.  She is due for her 10th dose of Prolia.  Questions regarding safety of Benadryl for insomnia.  Problems with a sore on her right foot.    Review of Systems:  Otherwise, a comprehensive review of systems was negative except as noted.     Medications, Allergies and Problem List   Patient Active Problem List   Diagnosis     Hypothyroidism     Chronic back pain     Hyperlipidemia     Osteoarthritis of both hands     Vitamin D deficiency     Gastritis     RLS (restless legs syndrome)     Osteoarthritis, hip, bilateral     Chronic constipation     Chronic anemia     Alzheimer's dementia without behavioral disturbance (H)     Essential hypertension     NSVT (nonsustained ventricular tachycardia) (H)     GERD (gastroesophageal reflux disease)     Chronic right shoulder pain     Chronic fatigue     Cardiomyopathy, nonischemic (H)     Osteoporosis     Hyponatremia     Scoliosis of lumbar spine, unspecified scoliosis type     Low back pain radiating to both legs     Lumbar spinal stenosis     History of skin cancer     Near syncope     Abnormal cardiovascular stress test     Decreased left ventricular function     Acute CVA (cerebrovascular accident) (H)     Right foot pain     Chronic insomnia       She has a past surgical history that includes Lumbar laminectomy (2005); Cataract extraction, bilateral; Cholecystectomy (2007); Hemorroidectomy; Dilation and curettage of uterus; Tonsillectomy; Colonoscopy (2014); ORIF hip fracture (2015); Breast lumpectomy; Coronary Angiogram (N/A, 2/13/2020); and Left Heart Catheterization Without Left Ventriculogram (Left, 2/13/2020).    Allergies   Allergen Reactions     Aricept [Donepezil]      Leg cramps       Exelon [Rivastigmine]       Nausea/vomiting     Lisinopril      Hypotension     Statins-Hmg-Coa Reductase Inhibitors Myalgia     Sulfa (Sulfonamide Antibiotics) Rash       Current Outpatient Medications   Medication Sig Dispense Refill     acetaminophen (TYLENOL) 500 MG tablet Take 1,000 mg by mouth 3 (three) times a day.       aspirin 81 MG EC tablet Take 1 tablet (81 mg total) by mouth daily.  0     calcium carbonate-vitamin D3 (CALCIUM 600 + D,3,) 600 mg(1,500mg) -200 unit per tablet Take 1 tablet by mouth 2 (two) times a day.       carvedilol (COREG) 12.5 MG tablet Take 1 tablet (12.5 mg total) by mouth 2 (two) times a day. 180 tablet 3     clopidogreL (PLAVIX) 75 mg tablet Take 1 tablet (75 mg total) by mouth daily. 21 tablet 0     ferrous sulfate 325 (65 FE) MG tablet Take 1 tablet (325 mg total) by mouth 3 (three) times a week. (Patient taking differently: Take 1 tablet by mouth 3 (three) times a week. MWF)  0     lansoprazole (PREVACID) 30 MG capsule Take 1 capsule (30 mg total) by mouth 2 (two) times a day. 180 capsule 3     levothyroxine (SYNTHROID, LEVOTHROID) 100 MCG tablet Take 1 tablet (100 mcg total) by mouth daily. 90 tablet 2     losartan (COZAAR) 50 MG tablet Take 50 mg by mouth at bedtime.       memantine (NAMENDA) 10 MG tablet TAKE 1 TABLET BY MOUTH TWICE DAILY 180 tablet 1     polyethylene glycol (MIRALAX) 17 gram packet Take 17 g by mouth daily as needed.        spironolactone (ALDACTONE) 25 MG tablet Take 1 tablet (25 mg total) by mouth daily. 30 tablet 12     TURMERIC ROOT EXTRACT ORAL Take 300 mg by mouth 2 (two) times a day. Noon and Evening       vit A/vit C/vit E/zinc/copper (ICAPS AREDS ORAL) Take 1 capsule by mouth 2 (two) times a day. Noon and Evening       VITAMIN D2 50,000 unit capsule TAKE 1 CAPSULE (50,000 UNITS TOTAL) BY MOUTH ONCE A WEEK. 12 capsule 2     melatonin 3 mg cap Take 3 mg by mouth at bedtime.  0     rosuvastatin (CRESTOR) 10 MG tablet Take 1 tablet (10 mg total) by mouth at bedtime. 30 tablet 11  "    Current Facility-Administered Medications   Medication Dose Route Frequency Provider Last Rate Last Dose     [START ON 3/9/2020] denosumab 60 mg (PROLIA 60 mg/ml)  60 mg Subcutaneous Q6 Months Kehinde Tate MD            Physical Exam   General Appearance:   Pleasant well-appearing elderly woman    /74 (Patient Site: Right Arm, Patient Position: Sitting, Cuff Size: Adult Large)   Ht 5' 4\" (1.626 m)   Wt 123 lb (55.8 kg)   LMP  (LMP Unknown)   BMI 21.11 kg/m          Lungs clear bilaterally  Heart regular rate and rhythm  No peripheral edema  Shallow ulceration on lateral aspect of right foot at head of fifth metatarsal     Additional Information   Social History     Tobacco Use     Smoking status: Never Smoker     Smokeless tobacco: Never Used   Substance Use Topics     Alcohol use: Yes     Alcohol/week: 1.0 standard drinks     Types: 1 Glasses of wine per week     Comment: glass of wine with dinner     Drug use: No         Review and/or order of clinical lab tests: Rechecking BMP    Review and/or order of medicine tests: Ordering 24-hour Holter monitor.  Reviewed echocardiogram from February 15 showing decreased left ventricular systolic function with EF 40%.  Also reviewed results of coronary angiogram showing moderate disease.    Review and summarization of old records and/or obtaining history from someone other than the patient and.or discussion of case with another health care provider: Reviewed records from hospitalization on February 14 with acute CVA following coronary angiogram.  Plavix for 21 days added and started on rosuvastatin along with spironolactone.       Kehinde Tate MD  "

## 2021-06-06 NOTE — TELEPHONE ENCOUNTER
Spoke with Octavio at  and relayed message below from Dr. Tate.  He verbalized understanding and had no further questions at this time.  Moni KENNEY, ARTEMIO/ILA....................4:44 PM

## 2021-06-07 NOTE — TELEPHONE ENCOUNTER
Who is calling:  The patient's daughter, Karen  Reason for Call: Karen is requesting that Kehinde Tate MD reviews this message below pertaining to the Relafen refill. The caller is requesting that the prescription is sent to the [facility] pharmacy prior to the telephone appointment on 5/4/20 if Kehinde Tate MD agrees with the patient starting the Nabumetone again.  Date of last appointment with primary care:   Okay to leave a detailed message: Yes

## 2021-06-07 NOTE — PROGRESS NOTES
"Karen Arthur is a 85 y.o. female who is being evaluated via a billable telephone visit.      The patient has been notified of following:     \"This telephone visit will be conducted via a call between you and your physician/provider. We have found that certain health care needs can be provided without the need for a physical exam.  This service lets us provide the care you need with a short phone conversation.  If a prescription is necessary we can send it directly to your pharmacy.  If lab work is needed we can place an order for that and you can then stop by our lab to have the test done at a later time.    Telephone visits are billed at different rates depending on your insurance coverage. During this emergency period, for some insurers they may be billed the same as an in-person visit.  Please reach out to your insurance provider with any questions.    If during the course of the call the physician/provider feels a telephone visit is not appropriate, you will not be charged for this service.\"    Patient has given verbal consent to a Telephone visit? Yes    What phone number would you like to be contacted at?         Additional provider notes:  This 85 y.o. old woman with dementia, hypertension, and chronic low back pain recent coronary angiogram following abnormal stress test and near syncopal episodes.  Found to have moderate disease.  Subsequently developed left arm weakness and was found to have acute/subacute CVA.  Doing better.  Arm weakness is resolved.  Continues on aspirin and rosuvastatin.  Now having increasing low back pain despite Relafen and Tylenol.  No pain radiating through right.  Underwent SHERIF in 2019 but having more symptoms in her legs at that time.  She does get relief when lying down.  No chest pain.  No increasing shortness of breath or edema.  Sleeping better at night.  Completed 10 doses of Prolia.  Due for DEXA.    Assessment/Plan:  1. Chronic bilateral low back pain without " sciatica  Known scoliosis and severe arthritis involving lumbar spine with spinal stenosis and foraminal stenosis.  Increasing pain across lower back without radiation into her leg.  SHERIF was helpful last year but having more pain down her legs at that time.  Already using Relafen and acetaminophen.  Could retry lidocaine 4% patch although question whether it caused some skin irritation when previously tried.  Follow-up at spine clinic when able.    2. History of CVA (cerebrovascular accident)  Hospitalized with acute CVA earlier this year with left arm weakness following coronary angiogram.  MRI with multiple acute/subacute infarcts.  She completed 3 weeks of Plavix and is now on aspirin 325 mg.  Rosuvastatin is being increased to 20 mg daily.  Weakness involving left arm has resolved.  Recheck LFTs and CPK at next visit.    3. Cardiomyopathy, nonischemic (H)  Echocardiogram with decreased left ventricular systolic function and global hypokinesis.  Follow-up echo during hospitalization did show improvement with EF at 40%.  Stable with current medical management.    4. Late onset Alzheimer's disease without behavioral disturbance (H)  Continues Namenda.  Unable to tolerate Aricept or Exelon    5. Age-related osteoporosis without current pathological fracture  She completed 10 doses of Prolia.  We will plan for DEXA later this year    6. Essential hypertension  Good blood pressure control.  128/62.     Occasional days of feeling more tired may correlate with blood pressures running lower.  112/56 this weekend for example.  Maintain good hydration.    7. Chronic insomnia  Doing better with melatonin        Phone call duration:  30 minutes    Kehinde Tate MD

## 2021-06-07 NOTE — TELEPHONE ENCOUNTER
Refill Approved    Rx renewed per Medication Renewal Policy. Medication was last renewed on 10/18/19.    Kami Blackwell, [facility] Connection Triage/Med Refill 4/30/2020     Requested Prescriptions   Pending Prescriptions Disp Refills     memantine (NAMENDA) 10 MG tablet [Pharmacy Med Name: Memantine HCl Oral Tablet 10 MG] 180 tablet 0     Sig: TAKE 1 TABLET BY MOUTH TWICE DAILY.       Cholinesterase Inhibitor/Anti-Alzheimer Agent Refill Protocol Passed - 4/28/2020  4:39 PM        Passed - Visit with PCP or prescribing provider visit in last 6 months or next 3 months     Last office visit with prescriber/PCP: 2/27/2020 OR same dept: 2/27/2020 Kehinde Tate MD OR same specialty: 2/27/2020 Kehinde Tate MD Last physical: 12/30/2019 Last MTM visit: Visit date not found     Next appt within 3 mo: Visit date not found  Next physical within 3 mo: Visit date not found  Prescriber OR PCP: Kehinde Tate MD  Last diagnosis associated with med order: 1. Dementia without behavioral disturbance, unspecified dementia type (H)  - memantine (NAMENDA) 10 MG tablet [Pharmacy Med Name: Memantine HCl Oral Tablet 10 MG]; TAKE 1 TABLET BY MOUTH TWICE DAILY  Dispense: 180 tablet; Refill: 0    If protocol passes may refill for 6 months if within 3 months of last provider visit (or a total of 9 months).

## 2021-06-07 NOTE — TELEPHONE ENCOUNTER
This medicine was discontinued in the hospital and I am not sure if this is something that she should be taking.  Dr. Parker should decide next week if he wants her on this still.

## 2021-06-07 NOTE — TELEPHONE ENCOUNTER
Please review message below and advise. According to recent office visit on 2/27/20 with Dr. Tate.    5. Chronic bilateral low back pain with bilateral sciatica  She will restart Relafen and following 21-day course of Plavix.  Currently using Tylenol as needed.    Ok for to put in Rx or hold off tell PCP returns.

## 2021-06-07 NOTE — TELEPHONE ENCOUNTER
Medication Question or Clarification  Who is calling: Daughter  What medication are you calling about (include dose and sig)?:    Disp  Refills  Start  End     rosuvastatin (CRESTOR) 10 MG tablet  30 tablet  11  2/27/2020      Sig - Route: Take 1 tablet (10 mg total) by mouth at bedtime. - Oral     Sent to pharmacy as: rosuvastatin 10 mg tablet (Crestor)     Notes to Pharmacy: Replaces 5 mg dose     E-Prescribing Status: Receipt confirmed by pharmacy (2/27/2020  4:22 PM CST)       Who prescribed the medication?: Kehinde Tate MD   What is your question/concern?: Caller stated that she was advised by Kehinde Tate MD that this medication would slowly increase until up to 40 mg. Caller is questioning if it's time to increase the dose.   Requested Pharmacy: [id number]  Okay to leave a detailed message?: Yes  [phone]

## 2021-06-07 NOTE — TELEPHONE ENCOUNTER
Increasing dose of rosuvastatin 20 mg daily is recommended.  New prescription sent to her pharmacy.

## 2021-06-07 NOTE — TELEPHONE ENCOUNTER
RN cannot approve Refill Request    RN can NOT refill this medication med is not covered by policy/route to provider     . Last office visit: 2/27/2020 Kehinde Tate MD Last Physical: 12/30/2019 Last MTM visit: Visit date not found Last visit same specialty: 2/27/2020 Kehinde Tate MD.  Next visit within 3 mo: Visit date not found  Next physical within 3 mo: Visit date not found      Kami Blackwell, [facility] Connection Triage/Med Refill 4/28/2020    Requested Prescriptions   Pending Prescriptions Disp Refills     nabumetone (RELAFEN) 750 MG tablet [Pharmacy Med Name: Nabumetone Oral Tablet 750 MG] 114 tablet 0     Sig: take 1 tablet by mouth twice daily.       There is no refill protocol information for this order

## 2021-06-08 NOTE — PATIENT INSTRUCTIONS - HE
I am glad to see you have been doing well  Continue current medications  Exercise daily  Check an echo in 3 mos  Follow up in 1 year or as needed

## 2021-06-08 NOTE — TELEPHONE ENCOUNTER
RN cannot approve Refill Request    RN can NOT refill this medication Protocol failed and NO refill given. Last office visit: Visit date not found Last Physical: Visit date not found Last MTM visit: Visit date not found Last visit same specialty: 2/27/2020 Kehinde Tate MD.  Next visit within 3 mo: Visit date not found  Next physical within 3 mo: Visit date not found      Osiris Tejeda, [facility] Triage/Med Refill 5/30/2020    Requested Prescriptions   Pending Prescriptions Disp Refills     nabumetone (RELAFEN) 750 MG tablet [Pharmacy Med Name: Nabumetone Oral Tablet 750 MG] 60 tablet 0     Sig: Take 1 tablet (750 mg total) by mouth 2 (two) times a day.       There is no refill protocol information for this order

## 2021-06-08 NOTE — PROGRESS NOTES
Office Visit - Follow Up   Karen Arthur   81 y.o. female    Date of Visit: 1/9/2017    Chief Complaint   Patient presents with     Results     ct scan        Assessment and Plan   1. Dementia  We discussed potential side effects of Aricept including orthostatic hypotension.  I discussed its safety with cardiology.  While some small risk is present, I believe benefits outweigh risk and will start 5 mg of Aricept daily.  We also discussed side effects of nausea and diarrhea.  If she is tolerating will plan to increase to 10 mg in 3 months when she returns from [place].    2. Nonischemic cardiomyopathy  Most recent evaluation including cardiac MRI showing ejection fraction of 45%.  Continue current medical management including losartan and carvedilol.  She will follow up in cardiology clinic and I anticipate further titration of the carvedilol.    3. Near syncope  Thought to be vasovagal.  She has had extensive cardiac evaluation.  Normal nuclear stress test.  We'll also continue lansoprazole for GERD likely responsible for her chest pain symptoms    4. Pulmonary nodules  Incidentally noted on MRI and confirmed on CT scan of chest.  These are indeterminate but likely benign but will plan on follow-up CT scan in 6 months to confirm stability    Return in about 3 months (around 4/9/2017) for Recheck.     History of Present Illness   This 81 y.o. old woman with dementia, generalized anxiety, recurrent syncope and chest pain, and multiple other medical problems is here to discuss several issues.  She is completed cardiac evaluation for recurrent episodes of near syncope and chest pain.  This is felt to be primarily vasovagal and GERD is contributing to her chest pain symptoms.  There was some question regarding her cardiac function but most recent study, cardiac MRI showing ejection fraction of 45% more consistent with the nuclear stress test that was performed earlier this year.  An echocardiogram had suggested that  her ejection fraction was down to 35% but this was probably not correct.  She is feeling better and has had no recurrent episodes of chest pain/weakness except for on one occasion one month ago.  She continues to take lansoprazole.  She is trying to maintain good hydration drinking 2 L of water every day.  Incidentally noted during cardiac MRI were small 5 mm nodules in both lung apices.  A CT scan was obtained confirming their presence.  They are indeterminate but likely benign although six-month follow-up CT scan is recommended by radiology.  Of primary concern is her worsening dementia despite taking Namenda.  Her  who is present is providing additional history and indicates that there is clearly worsening over the last 12 months with the patient becoming more forgetful.  She scored a 20 out of 30 on her SLUMS evaluation this summer.    Review of Systems:  Otherwise, a comprehensive review of systems was negative except as noted.     Medications, Allergies and Problem List   Patient Active Problem List   Diagnosis     HTN (hypertension)     Osteoporosis     Hypothyroidism     Chronic back pain     Hyperlipidemia     Osteoarthritis of both hands     Vitamin D deficiency     Gastritis     RLS (restless legs syndrome)     Osteoarthritis, hip, bilateral     Chronic constipation     Chronic anemia     Dementia     Near syncope     Essential hypertension     NSVT (nonsustained ventricular tachycardia)     GERD (gastroesophageal reflux disease)     Nonischemic cardiomyopathy     Pulmonary nodules       She has a past surgical history that includes Lumbar laminectomy (2005); Cataract extraction, bilateral; Cholecystectomy (2007); Hemorroidectomy; Dilation and curettage of uterus; Tonsillectomy; Colonoscopy (2014); ORIF hip fracture (2015); and Breast lumpectomy.    Allergies   Allergen Reactions     Lisinopril      Hypotension     Statins-Hmg-Coa Reductase Inhibitors Myalgia     Sulfa (Sulfonamide Antibiotics)  Rash       Current Outpatient Prescriptions   Medication Sig Dispense Refill     acetaminophen (TYLENOL) 500 MG tablet Take 1,000 mg by mouth 3 (three) times a day.       aspirin 325 MG EC tablet Take 325 mg by mouth daily.       calcium carbonate-vitamin D2 500 mg(1,250mg) -200 unit tablet Take 1 tablet by mouth 2 (two) times a day.       carvedilol (COREG) 6.25 MG tablet Take 1 tablet (6.25 mg total) by mouth 2 (two) times a day with meals. 60 tablet 3     diphenhydrAMINE (BENADRYL) 25 mg tablet Take 50 mg by mouth bedtime.       ergocalciferol (VITAMIN D2) 50,000 unit capsule Take 1 capsule (50,000 Units total) by mouth once a week. 13 capsule 3     ferrous sulfate 325 (65 FE) MG tablet Take 1 tablet by mouth daily with supper.        lansoprazole (PREVACID) 30 MG capsule Take 1 capsule (30 mg total) by mouth 2 (two) times a day. 180 capsule 3     levothyroxine (SYNTHROID, LEVOTHROID) 100 MCG tablet Take 1 tablet (100 mcg total) by mouth daily. 90 tablet 3     losartan (COZAAR) 50 MG tablet Take 1 tablet (50 mg total) by mouth bedtime. 90 tablet 3     memantine (NAMENDA) 10 MG tablet Take 1 tablet (10 mg total) by mouth 2 (two) times a day. 180 tablet 3     nabumetone (RELAFEN) 750 MG tablet Take 1 tablet (750 mg total) by mouth daily. 90 tablet 3     polyethylene glycol (MIRALAX) 17 gram packet Take 17 g by mouth daily as needed.        TURMERIC ROOT EXTRACT ORAL Take 350 mg by mouth 2 (two) times a day.       donepezil (ARICEPT) 5 MG tablet Take 1 tablet (5 mg total) by mouth bedtime. 30 tablet 11     Current Facility-Administered Medications   Medication Dose Route Frequency Provider Last Rate Last Dose     denosumab 60 mg (PROLIA 60 mg/ml)  60 mg Subcutaneous Q6 Months Kehinde Tate MD   60 mg at 07/28/16 1451        Physical Exam   General Appearance:   Mildly anxious but otherwise well-appearing elderly woman    Visit Vitals     /82 (Patient Site: Left Arm, Patient Position: Sitting, Cuff Size:  "Adult Large)     Pulse 64     Ht 5' 3\" (1.6 m)     Wt 126 lb (57.2 kg)     SpO2 93%     BMI 22.32 kg/m2                Additional Information   Social History   Substance Use Topics     Smoking status: Never Smoker     Smokeless tobacco: Never Used     Alcohol use 0.6 oz/week     1 Glasses of wine per week      Comment: glass of wine with dinner           Review and/or order of radiology tests: Reviewed CT scan of chest with bilateral 5 mm nodules in both apices January 2017  Review and/or order of medicine tests: Reviewed result of cardiac MRI showing ejection fraction of 45% December 2016  Discussion of test results with performing physician: I spoke with Dr. Sutton regarding recent evaluation and precautions using Aricept.    Review and summarization of old records and/or obtaining history from someone other than the patient and.or discussion of case with another health care provider: I reviewed cardiology consultation from December 29, 2016 evaluating for recurrent syncope and chest pain.  Felt to be vasovagal.  Patient's  is also present providing additional history.       Kehinde Tate MD  "

## 2021-06-08 NOTE — PROGRESS NOTES
Review of Systems - History obtained from the patient  General ROS: negative  Psychological ROS: negative  Ophthalmic ROS: negative  ENT ROS: negative  Hematological and Lymphatic ROS: negative  Respiratory ROS: negative  Cardiovascular ROS: negative  Gastrointestinal ROS: negative  Genito-Urinary ROS: negative  Musculoskeletal ROS: negative   Neurological ROS: positive for - daytime sleepiness   Dermatological ROS: negative

## 2021-06-08 NOTE — PROGRESS NOTES
Office Visit - Follow Up   Karen Arthur   81 y.o. female    Date of Visit: 1/19/2017    Chief Complaint   Patient presents with     Cough     chest congestion        Assessment and Plan   1. Acute bronchitis  We'll treat with a Z-Calos.  She can use OTC cough syrup such as Robitussin-DM.  I will also get her  a prescription in case his symptoms worsen.    2. Dementia  Unable to tolerate Aricept causing leg pain.  Already on Namenda 10 mg twice a day but having progressive cognitive impairment.  We discussed other treatment options.  I'm recommending starting Exelon patch.  Hopefully this will avoid the same side effects as Aricept although she and her  understand that it is in the same family of medications and may cause similar trouble.    3. Essential hypertension  Blood pressure remains well controlled with current dose of losartan.  Stable from a cardiac standpoint and met with nurse practitioner at heart clinic this morning.  Continue current dose of carvedilol.    Return in about 3 months (around 4/19/2017) for Recheck.     History of Present Illness   This 81 y.o. old woman with dementia is here with cough that developed over the past week.  Cough productive of clear sputum.  Slightly feverish.  Mild sore throat.   is coming down with same symptoms.  Denies worsening dyspnea.  No chest pain.  We also discussed her response to Aricept 5 mg started for dementia.  She is already on Namenda 10 mg twice a day and Aricept was added as she is having progressive cognitive impairment.  Unfortunately, she developed leg pain bilaterally after starting Aricept and the symptoms persisted for the next 4 days and finally resolved after discontinuing the medication.  She is still interested as is her  and trying some other treatment strategies.  Other medical problems remain stable.  Remains on losartan and carvedilol.  She met with nurse practitioner at heart clinic this morning.  No changes to  medications.  She will be due for Prolia for osteoporosis when she returns from [place].    Review of Systems: No night sweats.  No chest pain.  No nausea or diarrhea      Medications, Allergies and Problem List   Patient Active Problem List   Diagnosis     HTN (hypertension)     Osteoporosis     Hypothyroidism     Chronic back pain     Hyperlipidemia     Osteoarthritis of both hands     Vitamin D deficiency     Gastritis     RLS (restless legs syndrome)     Osteoarthritis, hip, bilateral     Chronic constipation     Chronic anemia     Dementia     Near syncope     Essential hypertension     NSVT (nonsustained ventricular tachycardia)     GERD (gastroesophageal reflux disease)     Nonischemic cardiomyopathy     Pulmonary nodules     Acute bronchitis       She has a past surgical history that includes Lumbar laminectomy (2005); Cataract extraction, bilateral; Cholecystectomy (2007); Hemorroidectomy; Dilation and curettage of uterus; Tonsillectomy; Colonoscopy (2014); ORIF hip fracture (2015); and Breast lumpectomy.    Allergies   Allergen Reactions     Aricept [Donepezil]      Leg cramps       Lisinopril      Hypotension     Statins-Hmg-Coa Reductase Inhibitors Myalgia     Sulfa (Sulfonamide Antibiotics) Rash       Current Outpatient Prescriptions   Medication Sig Dispense Refill     acetaminophen (TYLENOL) 500 MG tablet Take 1,000 mg by mouth 3 (three) times a day.       aspirin 325 MG EC tablet Take 325 mg by mouth daily.       calcium carbonate-vitamin D2 500 mg(1,250mg) -200 unit tablet Take 1 tablet by mouth 2 (two) times a day.       carvedilol (COREG) 12.5 MG tablet Take 1 tablet (12.5 mg total) by mouth 2 (two) times a day with meals. 180 tablet 3     diphenhydrAMINE (BENADRYL) 25 mg tablet Take 50 mg by mouth bedtime.       ergocalciferol (VITAMIN D2) 50,000 unit capsule Take 1 capsule (50,000 Units total) by mouth once a week. 13 capsule 3     ferrous sulfate 325 (65 FE) MG tablet Take 1 tablet by mouth  "daily with supper.        lansoprazole (PREVACID) 30 MG capsule Take 1 capsule (30 mg total) by mouth 2 (two) times a day. 180 capsule 3     levothyroxine (SYNTHROID, LEVOTHROID) 100 MCG tablet Take 1 tablet (100 mcg total) by mouth daily. 90 tablet 3     losartan (COZAAR) 50 MG tablet Take 1 tablet (50 mg total) by mouth bedtime. 90 tablet 3     memantine (NAMENDA) 10 MG tablet Take 1 tablet (10 mg total) by mouth 2 (two) times a day. 180 tablet 3     nabumetone (RELAFEN) 750 MG tablet Take 1 tablet (750 mg total) by mouth daily. 90 tablet 3     polyethylene glycol (MIRALAX) 17 gram packet Take 17 g by mouth daily as needed.        TURMERIC ROOT EXTRACT ORAL Take 350 mg by mouth 2 (two) times a day.       azithromycin (ZITHROMAX Z-NILDA) 250 MG tablet Take 2 tablets (500 mg) on  Day 1,  followed by 1 tablet (250 mg) once daily on Days 2 through 5. 6 tablet 0     rivastigmine (EXELON) 4.6 mg/24 hr Place 1 patch on the skin daily. 30 patch 12     Current Facility-Administered Medications   Medication Dose Route Frequency Provider Last Rate Last Dose     denosumab 60 mg (PROLIA 60 mg/ml)  60 mg Subcutaneous Q6 Months Kehinde Tate MD   60 mg at 07/28/16 1451        Physical Exam   General Appearance:   Well-appearing elderly woman    Visit Vitals     /70 (Patient Site: Right Arm, Patient Position: Sitting, Cuff Size: Adult Large)     Pulse 69     Temp 98.6  F (37  C)     Ht 5' 3\" (1.6 m)     Wt 124 lb (56.2 kg)     SpO2 99%     BMI 21.97 kg/m2       HEENT: Normal, oropharynx normal, TMs normal  Neck without lymphadenopathy  Respiratory: Normal respiratory effort.  Lungs are clear with no rales or wheezes.  Heart: Regular rate and rhythm          Additional Information   Social History   Substance Use Topics     Smoking status: Never Smoker     Smokeless tobacco: Never Used     Alcohol use 0.6 oz/week     1 Glasses of wine per week      Comment: glass of wine with dinner              Kehinde Tate, " MD

## 2021-06-08 NOTE — PROGRESS NOTES
Assessment/Plan:     1. Nonischemic cardiomyopathy with systolic dysfunction, NYHA class II: Karen Arthur appears well compensated.  We discussed monitoring heart failure symptoms, following a low-sodium diet, monitoring daily weights, and heart failure treatment.  I increased her carvedilol to 12.5 mg twice a day.  I encouraged her to call the clinic if she experiences any dizziness or lightheadedness.  I encouraged her to continue to exercise on a regular basis.      Heart failure treatment includes:  - Beta blocker therapy with carvedilol 12.5 mg twice a day  - ARB therapy with losartan 50 mg daily    Follow-up in the heart failure clinic in April when you return from [place] and with Dr. Sutton in August    Subjective:     Karen Arthur is seen at [facility] heart failure clinic today for continued follow-up.  Her  Devaughn accompanies her today.  She follows up for nonischemic cardiomyopathy with systolic dysfunction.  Her most recent echocardiogram was done November 22, 2016 which revealed ejection fraction of 35%.  She also had a cardiac MRI on December 23, 2016 which showed her LVEF to be 45%.  She has a past medical history significant for hypertension and hyperlipidemia.    During the last clinic visit, Dr. Sutton increased her carvedilol to 6.25 mg twice a day.  She states that she tolerated the increase well.  Today, she has complaints of fatigue which is chronic and mild abdominal bloating.  She states that she's had a cold for the last 2 days which could be affecting her fatigue level.  She denies any other acute heart failure symptoms.  She denies lightheadedness, shortness of breath, dyspnea on exertion, orthopnea, PND, palpitations, chest pain and lower extremity edema.      She is not monitoring home weights. She participates in regular physical activity including walking.      Review of Systems:   General: WNL  Eyes: WNL  Ears/Nose/Throat: WNL  Lungs: WNL  Heart: WNL  Stomach:  WNL  Bladder: WNL  Muscle/Joints: WNL  Skin: WNL  Nervous System: WNL  Mental Health: WNL     Blood: WNL     Patient Active Problem List   Diagnosis     HTN (hypertension)     Osteoporosis     Hypothyroidism     Chronic back pain     Hyperlipidemia     Osteoarthritis of both hands     Vitamin D deficiency     Gastritis     RLS (restless legs syndrome)     Osteoarthritis, hip, bilateral     Chronic constipation     Chronic anemia     Dementia     Near syncope     Essential hypertension     NSVT (nonsustained ventricular tachycardia)     GERD (gastroesophageal reflux disease)     Nonischemic cardiomyopathy     Pulmonary nodules       Past Medical History   Diagnosis Date     Benign microscopic hematuria      neg workup     Chest pain 11/2016     Hospitalization with chest pain, normal pharmacologic nuclear stress test, GERD suspected     Chronic anemia 1/4/2016     Chronic back pain      Chronic constipation      Dementia 07/28/2016     SLUMS 20 out of 30 in July 2016 started Namenda earlier this year     Diverticulosis      Eczema of scalp      Gallstones      Gastritis 2015     NSAIDs     GERD (gastroesophageal reflux disease) 11/8/2016     Responsible for recurrent chest pain      Hip fracture 2015     ORIF hip fracture 2015     HTN (hypertension)      Hyperlipidemia      Hypothyroidism      Lumbar radiculopathy      SHERIF 2014 L1-L2     Near syncope 10/3/2016     Nonischemic cardiomyopathy      Echocardiogram November 2016 with ejection fraction 35% with global moderate reduction in left ventricular systolic function, aortic sclerosis without significant aortic stenosis, however, cardiac MRI with ejection fraction 45% December 2016 which is more consistent with findings on nuclear stress test     NSVT (nonsustained ventricular tachycardia) 11/8/2016     Event monitor 2016     Occipital neuritis 2011     Osteoarthritis of both hands      Osteoarthritis, hip, bilateral      Osteoporosis      started Prolia 7/20/15  after hip fx Tscore -1.9     Pulmonary nodules 01/05/2017     5 mm nodules bilateral apices, indeterminate but follow-up CT scan in 6 months to confirm stability     RLS (restless legs syndrome)      Vitamin D deficiency        Past Surgical History   Procedure Laterality Date     Lumbar laminectomy  2005     L3 - L4     Cataract extraction, bilateral       Cholecystectomy  2007     Hemorroidectomy       Dilation and curettage of uterus       Tonsillectomy       Colonoscopy  2014     Orif hip fracture  2015     Breast lumpectomy         Family History   Problem Relation Age of Onset     No Medical Problems Mother      age 96     Heart attack Father      Sudden death Father 71     No Medical Problems Sister      No Medical Problems Brother      No Medical Problems Brother        Social History     Social History     Marital status:      Spouse name: N/A     Number of children: 6     Years of education: N/A     Occupational History     Homemaker      Social History Main Topics     Smoking status: Never Smoker     Smokeless tobacco: Never Used     Alcohol use 0.6 oz/week     1 Glasses of wine per week      Comment: glass of wine with dinner     Drug use: No     Sexual activity: No     Other Topics Concern     Not on file     Social History Narrative       Current Outpatient Prescriptions   Medication Sig Dispense Refill     acetaminophen (TYLENOL) 500 MG tablet Take 1,000 mg by mouth 3 (three) times a day.       aspirin 325 MG EC tablet Take 325 mg by mouth daily.       calcium carbonate-vitamin D2 500 mg(1,250mg) -200 unit tablet Take 1 tablet by mouth 2 (two) times a day.       diphenhydrAMINE (BENADRYL) 25 mg tablet Take 50 mg by mouth bedtime.       donepezil (ARICEPT) 5 MG tablet Take 1 tablet (5 mg total) by mouth bedtime. 30 tablet 11     ergocalciferol (VITAMIN D2) 50,000 unit capsule Take 1 capsule (50,000 Units total) by mouth once a week. 13 capsule 3     ferrous sulfate 325 (65 FE) MG tablet Take 1  tablet by mouth daily with supper.        lansoprazole (PREVACID) 30 MG capsule Take 1 capsule (30 mg total) by mouth 2 (two) times a day. 180 capsule 3     levothyroxine (SYNTHROID, LEVOTHROID) 100 MCG tablet Take 1 tablet (100 mcg total) by mouth daily. 90 tablet 3     losartan (COZAAR) 50 MG tablet Take 1 tablet (50 mg total) by mouth bedtime. 90 tablet 3     memantine (NAMENDA) 10 MG tablet Take 1 tablet (10 mg total) by mouth 2 (two) times a day. 180 tablet 3     nabumetone (RELAFEN) 750 MG tablet Take 1 tablet (750 mg total) by mouth daily. 90 tablet 3     polyethylene glycol (MIRALAX) 17 gram packet Take 17 g by mouth daily as needed.        TURMERIC ROOT EXTRACT ORAL Take 350 mg by mouth 2 (two) times a day.       carvedilol (COREG) 12.5 MG tablet Take 1 tablet (12.5 mg total) by mouth 2 (two) times a day with meals. 180 tablet 3     Current Facility-Administered Medications   Medication Dose Route Frequency Provider Last Rate Last Dose     denosumab 60 mg (PROLIA 60 mg/ml)  60 mg Subcutaneous Q6 Months Kehinde Tate MD   60 mg at 07/28/16 1451       Allergies   Allergen Reactions     Lisinopril      Hypotension     Statins-Hmg-Coa Reductase Inhibitors Myalgia     Sulfa (Sulfonamide Antibiotics) Rash       Objective:     Vitals:    01/19/17 1038   BP: 124/78   Pulse: 74   Resp: 16   SpO2: 99%     Wt Readings from Last 3 Encounters:   01/19/17 123 lb (55.8 kg)   01/09/17 126 lb (57.2 kg)   12/29/16 125 lb (56.7 kg)       General Appearance:   Alert, cooperative and in no acute distress.   HEENT:  No scleral icterus; the mucous membranes were pink and moist.   Neck: JVP normal. No HJR.   Chest: The spine was straight. The chest was symmetric.   Lungs:   Respirations unlabored; the lungs are clear to auscultation.   Cardiovascular:   Regular rhythm. S1 and S2 without murmur, clicks or rubs. Radial and posterior tibial pulses are intact and symmetrical.    Abdomen:  Soft, nontender, nondistended, bowel  sounds present   Extremities: No cyanosis, clubbing, or edema.   Skin: No xanthelasma.   Neurologic: Mood and affect are appropriate.         Lab Review   Lab Results   Component Value Date    CREATININE 0.77 10/31/2016    BUN 15 10/31/2016     (L) 10/31/2016    K 3.8 10/31/2016    CL 99 10/31/2016    CO2 26 10/31/2016     Lab Results   Component Value Date     10/31/2016     BNP (pg/mL)   Date Value   10/31/2016 110     CREATININE (mg/dL)   Date Value   10/31/2016 0.77   07/28/2016 1.04   09/15/2015 0.84   05/23/2015 1.10       Cardiographics  Echocardiogram: 11/22/2016  Summary  1. The left ventricle is normal in size. Left ventricular systolic  performance is moderately reduced. The ejection fraction is estimated to  be 35%.  2. There is moderate global reduction in left ventricular systolic  performance.  3. There is mild-moderate aortic valve sclerosis without significant  valvular stenosis.    Cardiac MRI 12/23/2016  IMPRESSIONS:    1. Normal left ventricular size, wall thickness with mild global hypokinesis. The quantified left ventricular ejection fraction is 45%.   2. There is myocardial scar in mid layer of basal inferior segment which could be caused by viral myocarditis. This could be the reason for patient to have nonsustained V. tach.   2. Normal right ventricular size function.   3. No obvious valvular disease.      30 minutes were spent face to face with the patient with greater than 50% spent on education and counseling.      Mariama Kc, [facility]   Heart Failure Clinic

## 2021-06-09 NOTE — PROGRESS NOTES
Office Visit - Follow Up   Karen Arthur   85 y.o. female    Date of Visit: 7/7/2020    Chief Complaint   Patient presents with     Back Pain     Fatigue     medication check        Assessment and Plan   1. Late onset Alzheimer's disease without behavioral disturbance (H)  Continues on Namenda.  Good support from  at home.    2. Cardiomyopathy, nonischemic (H)  Most recent echocardiogram showing EF 40%.  Stable with current medical management.  Met with cardiology in May.  LBBB may be contributing factor.  CRT discussed but patient and family are reluctant to pursue any further interventions.    3. ASCVD (arteriosclerotic cardiovascular disease)  Underwent coronary angiogram showing only moderate disease.  Continue aspirin and rosuvastatin indefinitely.    4. Chronic bilateral low back pain with sciatica, sciatica laterality unspecified  Worsening back pain with known scoliosis and severe arthritis with spinal stenosis and foraminal stenosis.  Pain will radiate into her right leg.  SHERIF previously helpful at spine clinic and family are interested in reestablishing care.  She is already taking Relafen and nabumetone.  We discussed continuing acetaminophen 1000 mg 3 times daily although she can take on occasion and extra 1000 mg if needed.  I would avoid opioids given potential for side effects.  - [facility] referral [facility]    5. Chronic fatigue  I suspect combination of factors including age, medications, chronic pain.  Will look for any other metabolic contributing factor.  We discussed holding rosuvastatin for 2 weeks to see if this makes any difference.  - Comprehensive Metabolic Panel  - HM2(CBC w/o Differential)    6. Age-related osteoporosis without current pathological fracture  She has completed 10 doses of Prolia.  We will plan for a DEXA this winter to establish new baseline.    7. Vitamin D deficiency  Continue vitamin D replacement.  Vitamin D level was excellent when checked this past  year.    8. Chronic anemia  Monitor hemoglobin    9. Chronic insomnia  Continue melatonin as needed    10. Essential hypertension  Excellent blood pressure control with current medication    11. Mixed hyperlipidemia  Recheck lipid profile on rosuvastatin started earlier this year  - Lipid Cascade    12. Hypothyroidism, unspecified type  Monitor TSH and adjust Synthroid as necessary  - Thyroid Stimulating Hormone (TSH)    13. Encounter for therapeutic drug monitoring  Monitor CPK and LFTs while on statin  - CK Total    Return in about 6 months (around 1/7/2021) for Annual physical.     History of Present Illness   This 85 y.o. old woman with Alzheimer's dementia, coronary artery disease, nonischemic cardiomyopathy, history of CVA following coronary angiogram, chronic low back pain from severe degenerative disc disease and scoliosis, hypertension, and osteoporosis here with her  and daughter to follow-up these medical problems and several other concerns.  Hospitalized earlier this year with acute CVA with left arm weakness following coronary angiogram.  MRI showing multiple acute and subacute infarcts.  Continues on aspirin and rosuvastatin.  Symptoms of weakness did resolve.  Coronary angiogram showing only moderate disease.  Echocardiogram demonstrating decreased left ventricular systolic function with global hypokinesis.  Met with cardiology in May.  Most recent echocardiogram did show improve left ventricular systolic function at 40%.  Possibility of LBBB as culprit of cardiomyopathy.  Role of CRT discussed but patient and family are reluctant to pursue any further interventions.  Recommended to continue aspirin and rosuvastatin and consider repeating echocardiogram in 6 to 12 months.  She denies any chest pain.  No recurrent near syncopal episodes.  Denies feeling short of breath.  She does complain of generalized fatigue and low energy.  Daughter is questioning whether any of her medications can be  discontinued.  Rosuvastatin is only new addition to her long list.  Cognition continues to deteriorate but she has good support from her .  Continues on Namenda but could not tolerate Aricept or Exelon.  Complaining of severe pain across lower back which has been chronic but getting recently worse.  Pain will intermittently radiate into her right leg and there is weakness.  She uses a cane when up ambulating.  She is now completed 10 doses of Prolia.  We discussed the importance of maintaining good calcium intake.  She is due for DEXA.    Review of Systems:  Otherwise, a comprehensive review of systems was negative except as noted.     Medications, Allergies and Problem List   Patient Active Problem List   Diagnosis     Hypothyroidism     Chronic back pain     Hyperlipidemia     Osteoarthritis of both hands     Vitamin D deficiency     Gastritis     RLS (restless legs syndrome)     Osteoarthritis, hip, bilateral     Chronic constipation     Chronic anemia     Alzheimer's dementia without behavioral disturbance (H)     Essential hypertension     NSVT (nonsustained ventricular tachycardia) (H)     GERD (gastroesophageal reflux disease)     Chronic right shoulder pain     Chronic fatigue     Cardiomyopathy, nonischemic (H)     Osteoporosis     Hyponatremia     Scoliosis of lumbar spine, unspecified scoliosis type     Low back pain radiating to both legs     Lumbar spinal stenosis     History of skin cancer     Near syncope     Abnormal cardiovascular stress test     Decreased left ventricular function     Right foot pain     Chronic insomnia     ASCVD (arteriosclerotic cardiovascular disease)     History of CVA (cerebrovascular accident)       She has a past surgical history that includes Lumbar laminectomy (2005); Cataract extraction, bilateral; Cholecystectomy (2007); Hemorroidectomy; Dilation and curettage of uterus; Tonsillectomy; Colonoscopy (2014); ORIF hip fracture (2015); Breast lumpectomy; Coronary  Angiogram (N/A, 2/13/2020); and Left Heart Catheterization Without Left Ventriculogram (Left, 2/13/2020).    Allergies   Allergen Reactions     Aricept [Donepezil]      Leg cramps       Exelon [Rivastigmine]      Nausea/vomiting     Lisinopril      Hypotension     Statins-Hmg-Coa Reductase Inhibitors Myalgia     Sulfa (Sulfonamide Antibiotics) Rash       Current Outpatient Medications   Medication Sig Dispense Refill     acetaminophen (TYLENOL) 500 MG tablet Take 1,000 mg by mouth 3 (three) times a day.       aspirin 325 MG EC tablet Take 325 mg by mouth daily.       calcium carbonate-vitamin D3 (CALCIUM 600 + D,3,) 600 mg(1,500mg) -200 unit per tablet Take 1 tablet by mouth 2 (two) times a day.       carvedilol (COREG) 12.5 MG tablet Take 1 tablet (12.5 mg total) by mouth 2 (two) times a day. 180 tablet 3     ferrous sulfate 325 (65 FE) MG tablet Take 1 tablet (325 mg total) by mouth 3 (three) times a week. (Patient taking differently: Take 1 tablet by mouth 3 (three) times a week. MWF)  0     lansoprazole (PREVACID) 30 MG capsule Take 1 capsule (30 mg total) by mouth 2 (two) times a day. 180 capsule 3     levothyroxine (SYNTHROID, LEVOTHROID) 100 MCG tablet Take 1 tablet (100 mcg total) by mouth daily. 90 tablet 2     losartan (COZAAR) 50 MG tablet Take 1 tablet (50 mg total) by mouth daily. 90 tablet 3     melatonin 3 mg cap Take 3 mg by mouth at bedtime.  0     memantine (NAMENDA) 10 MG tablet TAKE 1 TABLET BY MOUTH TWICE DAILY. 180 tablet 1     nabumetone (RELAFEN) 750 MG tablet Take 1 tablet (750 mg total) by mouth 2 (two) times a day. 60 tablet 5     polyethylene glycol (MIRALAX) 17 gram packet Take 17 g by mouth daily as needed.        rosuvastatin (CRESTOR) 20 MG tablet Take 1 tablet (20 mg total) by mouth at bedtime. 90 tablet 3     spironolactone (ALDACTONE) 25 MG tablet Take 1 tablet (25 mg total) by mouth daily. 90 tablet 3     TURMERIC ROOT EXTRACT ORAL Take 300 mg by mouth 2 (two) times a day. Noon  "and Evening       vit A/vit C/vit E/zinc/copper (ICAPS AREDS ORAL) Take 1 capsule by mouth 2 (two) times a day. Noon and Evening       VITAMIN D2 1,250 mcg (50,000 unit) capsule TAKE 1 CAPSULE (50,000 UNITS TOTAL) BY MOUTH ONCE A WEEK. 12 capsule 3     No current facility-administered medications for this visit.         Physical Exam   General Appearance:   Pleasant elderly woman    /72 (Patient Site: Left Arm, Patient Position: Sitting, Cuff Size: Adult Large)   Pulse (!) 51   Ht 5' 4\" (1.626 m)   Wt 121 lb (54.9 kg)   LMP  (LMP Unknown)   SpO2 99%   BMI 20.77 kg/m        Respiratory: Normal respiratory effort.  Lungs are clear with no rales or wheezes.  Heart: Regular rate and rhythm   Extremities: No peripheral edema.  Neurologic: Grossly nonfocal  Skin: No cyanosis or pallor           Additional Information   Social History     Tobacco Use     Smoking status: Never Smoker     Smokeless tobacco: Never Used   Substance Use Topics     Alcohol use: Yes     Alcohol/week: 1.0 standard drinks     Types: 1 Glasses of wine per week     Comment: glass of wine with dinner     Drug use: No           Time: total time spent with the patient was 40 minutes of which >50% was spent in counseling and coordination of care      Kehinde Tate MD  "

## 2021-06-09 NOTE — TELEPHONE ENCOUNTER
----- Message from Kehinde Tate MD sent at 7/9/2020  8:01 AM CDT -----  I sent results on my chart.  However, please call her and her  and instruct her to stop taking Spironolactone.  It is causing some impairment of her kidney function and causing her potassium to be too high.  She should have her labs rechecked in 1 month.  This can be done on the same day that she is having her echocardiogram.  They can schedule a lab appointment at the [facility].

## 2021-06-09 NOTE — PROGRESS NOTES
Assessment:   Karen Arthur is a 85 y.o. y.o. female with past medical history significant for hypothyroidism, hyperlipidemia, vitamin D deficiency, restless leg syndrome, dementia, hypertension, GERD, nonischemic cardiomyopathy, osteoporosis who presents today for follow-up regarding chronic bilateral low back pain with radiation into the right lower extremity (previously bilateral) with limited walking and standing tolerance.  MRI lumbar spine shows extensive dextroconvex scoliosis with severe spinal stenosis L3-4 and moderate spinal stenosis L2-3 and L4-5.  There is also high-grade foraminal stenosis.  Patient status post an L5-S1 interlaminar epidural steroid injection on August 19, 2019 which provided 75% relief of her pain for approximately 6 months.  Since then, pain gradually returned.       Plan:     A shared decision making plan was used.  The patient's values and choices were respected.  The following represents what was discussed and decided upon by the physician assistant and the patient.  Patient is accompanied by her daughter who helps to provide history.    1.  DIAGNOSTIC TESTS: I reviewed the MRI lumbar spine.  No further diagnostic tests were ordered.    2.  PHYSICAL THERAPY: Patient participated in physical therapy at [place] for athletic medicine last year.  She still does home exercises on a daily basis.  No further physical therapy was ordered.  She will continue with home exercises for now.    3.  MEDICATIONS: No changes are made to the patient's medications.  She uses Tylenol 1000 mill grams 3 times daily, nabumetone 750 mg twice daily.      4.  INTERVENTIONS: I offered the patient a repeat L5-S1 interlaminar epidural steroid injection.  Patient indicated she would like to proceed and an order was placed.  -If axial low back pain were to worsen, we could consider facet joint injections.    5.  PATIENT EDUCATION:    -Patient is in agreement the above plan.  All questions were  answered.    6.  FOLLOW-UP: Patient can have a video visit follow-up 2 weeks after her repeat L5-S1 interlaminar epidural steroid injection.  If she has questions or concerns in the meantime, she should not hesitate to call.    Subjective:     Karen Arthur is a 85 y.o. female who presents today for follow-up regarding chronic  bilateral low back pain with radiation into right lower extremity.  I last saw the patient in September 5, 2019.  At that time she reported 75% improvement in her pain following an L5-S1 interlaminar epidural steroid injection performed August 19, 2019.  Patient reports that injection continue to provide relief of her pain for approximately 6 months.  After that, the pain gradually returned.    Patient complains of bilateral low back pain.  Pain spans across the low back at the waist.  Pain radiates into the right buttock and the right posterior lateral thigh to the knee.  She thinks occasionally it might extend below the knee, but that is not typical.  She denies any significant pain down the left leg, although she admits some days it might hurt there and she does not really pay attention.  She rates her pain today as a 3 out of 10.  At its worst it is an 8 of 10.  Pain is aggravated with standing, especially in one spot and prolonged walking.  Pain is alleviated with sitting down and laying down.  Her pain resolves completely with lying down.  She denies any new symptoms since he was last seen.  She denies any numbness or tingling down the legs.  She feels generally weak, but denies focal weakness.    Patient states that she went to physical therapy at [place] for athletic medicine in [place] last year.  She does her home exercises on a daily basis.  She uses Tylenol 1000 mill grams 3 times daily, nabumetone 750 mg twice daily.  These medications are helpful.    Past medical history is reviewed and is unchanged.    Review of Systems:  Positive for weakness, loss of bladder control  (chronic, stable), stumbles.  Negative for numbness/tingling, loss of bowel control, inability to urinate, headache, pain much worse at night, difficulty swallowing, difficulty with hand skills, fevers, unintentional weight loss.     Objective:   CONSTITUTIONAL:  Vital signs as above.  No acute distress.  The patient is well nourished and well groomed.    PSYCHIATRIC:  The patient is awake, alert, oriented to person, place and time.  The patient is answering questions appropriately with clear speech.  Normal affect.  HEENT: Normocephalic, atraumatic.  Sclera clear.    SKIN:  Skin over the face, posterior torso, bilateral upper and lower extremities is clean, dry, intact without rashes.  MUSCULOSKELETAL:  Gait is guarded but not antalgic.  Patient has a significant scoliotic deformity.  The patient is able to rise onto toes and heels bilaterally with support.  No  tenderness over the bilateral lower lumbar paraspinal muscles.      The patient has 5/5 strength for the bilateral hip flexors, knee flexors/extensors, ankle dorsiflexors/plantar flexors, ankle evertors/invertors.    NEUROLOGICAL: 1-2+ bilateral patellar and Achilles reflexes.  Negative Babinski's bilaterally.  No ankle clonus.  Sensation to light touch is intact in the bilateral L4, L5, and S1 dermatomes.       RESULTS:  I reviewed the MRI lumbar spine from [facility] dated July 30, 2019.  This shows extensive dextroconvex scoliosis of the lumbar spine.  There is severe spinal stenosis at L3-4 with redundancy of the cauda equina nerve roots.  There is moderate spinal stenosis L2-3 and L4-5.  Patient has multilevel foraminal stenosis which is most severe on the right at L5-S1.  There is also moderate to severe right foraminal stenosis L4-5.  Please see report for further details.

## 2021-06-09 NOTE — TELEPHONE ENCOUNTER
Patient Returning Call  Reason for call:  Return call with patient's daughter on the line.   Information relayed to patient:  Caller was informed of message below.   Patient has additional questions:  No  If YES, what are your questions/concerns:  n/a  Okay to leave a detailed message?: No

## 2021-06-09 NOTE — TELEPHONE ENCOUNTER
RN cannot approve Refill Request    RN can NOT refill this medication med is not covered by policy/route to provider. Last office visit: 2/27/2020 Kehinde Tate MD Last Physical: 12/30/2019 Last MTM visit: Visit date not found Last visit same specialty: 2/27/2020 Kehinde Tate MD.  Next visit within 3 mo: Visit date not found  Next physical within 3 mo: Visit date not found      Robyn Goddard, [facility] Triage/Med Refill 6/27/2020    Requested Prescriptions   Pending Prescriptions Disp Refills     VITAMIN D2 1,250 mcg (50,000 unit) capsule [Pharmacy Med Name: Vitamin D (Ergocalciferol) Oral Capsule 1.25 MG (89804 UT)] 12 capsule 0     Sig: TAKE 1 CAPSULE (50,000 UNITS TOTAL) BY MOUTH ONCE A WEEK.       There is no refill protocol information for this order

## 2021-06-09 NOTE — TELEPHONE ENCOUNTER
Left message for the patient's daughter that we are unable to send consent through my chart.  We would be able to fax or mail it to her.  Asked her to call and let us know what she would like to do.  Moni KENNEY CMA/ILA....................12:26 PM

## 2021-06-09 NOTE — PROGRESS NOTES
Office Visit - Follow Up   Karen Arthur   82 y.o. female    Date of Visit: 4/4/2017    Chief Complaint   Patient presents with     shoulder pain     right     Injections     Prolia     Hypertension        Assessment and Plan   1. Dementia  Tolerating Exelon 4.6 mg patch and will increase to 9.5 mg daily.  Continue Namenda 10 mg twice daily.    2. Osteoporosis  Continue Prolia every 6 months    3. Essential hypertension  Blood pressure looks well-controlled with current medication    4. Hypothyroidism  Recheck TSH at her physical this summer and continue Synthroid    5. Pulmonary nodules  We will plan for follow-up CT scan in the summer.  Six-month follow-up recommended in January    6. Chronic constipation  Likely related to either calcium or iron supplement.  She can try decreasing her iron supplement to only Monday Wednesday Friday    7. Chronic anemia  Continue to monitor hemoglobin and recheck in 3 months at her return    8. Near syncope  No recurrent symptoms.  Completed cardiac evaluation last fall    9. GERD (gastroesophageal reflux disease)  Likely responsible for recurrent chest pain.  Well-controlled with current dose of lansoprazole    10. Chronic right shoulder pain  Exam is unimpressive.  If symptoms worsen, orthopedic referral    Return in about 3 months (around 7/4/2017) for Annual physical.     History of Present Illness   This 82 y.o. old woman is here to follow-up multiple medical problems.  Her  accompanies.  Tolerating Exelon patch after having side effects from Aricept.  Cognitive deficits seem stable and she also is on Namenda.  She describes some intermittent pain involving her right shoulder.  It bothers her occasionally.  Also having problems with chronic constipation and questioning whether it could be a side effect of 1 of her medications.  She has had no recurrent episodes of near syncope and only had feelings of fullness/pain in her chest on one occasion when forgetting to  take her Prevacid in the morning.  She remains on Prolia for osteoporosis and is due for an injection today    Review of Systems: No abdominal pain.  No cough or dyspnea.  Appetite is good and weight is stable.       Medications, Allergies and Problem List   Patient Active Problem List   Diagnosis     Osteoporosis     Hypothyroidism     Chronic back pain     Hyperlipidemia     Osteoarthritis of both hands     Vitamin D deficiency     Gastritis     RLS (restless legs syndrome)     Osteoarthritis, hip, bilateral     Chronic constipation     Chronic anemia     Dementia     Near syncope     Essential hypertension     NSVT (nonsustained ventricular tachycardia)     GERD (gastroesophageal reflux disease)     Nonischemic cardiomyopathy     Pulmonary nodules     Chronic right shoulder pain       She has a past surgical history that includes Lumbar laminectomy (2005); Cataract extraction, bilateral; Cholecystectomy (2007); Hemorroidectomy; Dilation and curettage of uterus; Tonsillectomy; Colonoscopy (2014); ORIF hip fracture (2015); and Breast lumpectomy.    Allergies   Allergen Reactions     Aricept [Donepezil]      Leg cramps       Lisinopril      Hypotension     Statins-Hmg-Coa Reductase Inhibitors Myalgia     Sulfa (Sulfonamide Antibiotics) Rash       Current Outpatient Prescriptions   Medication Sig Dispense Refill     acetaminophen (TYLENOL) 500 MG tablet Take 1,000 mg by mouth 3 (three) times a day.       aspirin 325 MG EC tablet Take 325 mg by mouth daily.       calcium carbonate-vitamin D2 500 mg(1,250mg) -200 unit tablet Take 1 tablet by mouth 2 (two) times a day.       carvedilol (COREG) 12.5 MG tablet Take 1 tablet (12.5 mg total) by mouth 2 (two) times a day with meals. 180 tablet 3     diphenhydrAMINE (BENADRYL) 25 mg tablet Take 50 mg by mouth bedtime.       ergocalciferol (VITAMIN D2) 50,000 unit capsule Take 1 capsule (50,000 Units total) by mouth once a week. 13 capsule 3     [START ON 4/5/2017] ferrous  "sulfate 325 (65 FE) MG tablet Take 1 tablet (325 mg total) by mouth 3 (three) times a week.  0     lansoprazole (PREVACID) 30 MG capsule Take 1 capsule (30 mg total) by mouth 2 (two) times a day. 180 capsule 3     levothyroxine (SYNTHROID, LEVOTHROID) 100 MCG tablet Take 1 tablet (100 mcg total) by mouth daily. 90 tablet 3     losartan (COZAAR) 50 MG tablet Take 1 tablet (50 mg total) by mouth bedtime. 90 tablet 3     memantine (NAMENDA) 10 MG tablet Take 1 tablet (10 mg total) by mouth 2 (two) times a day. 180 tablet 3     nabumetone (RELAFEN) 750 MG tablet Take 1 tablet (750 mg total) by mouth daily. 90 tablet 3     polyethylene glycol (MIRALAX) 17 gram packet Take 17 g by mouth daily as needed.        TURMERIC ROOT EXTRACT ORAL Take 350 mg by mouth 2 (two) times a day.       rivastigmine (EXELON) 9.5 mg/24 hr Place 1 patch on the skin daily. 30 patch 12     Current Facility-Administered Medications   Medication Dose Route Frequency Provider Last Rate Last Dose     denosumab 60 mg (PROLIA 60 mg/ml)  60 mg Subcutaneous Q6 Months Kehinde Tate MD   60 mg at 04/04/17 1343        Physical Exam   General Appearance:   Well-appearing elderly woman    /64 (Patient Site: Left Arm, Patient Position: Sitting, Cuff Size: Adult Large)  Pulse 66  Ht 5' 3\" (1.6 m)  Wt 127 lb (57.6 kg)  SpO2 98%  BMI 22.5 kg/m2    HEENT: Normal  Respiratory: Normal respiratory effort.  Lungs are clear with no rales or wheezes.  Heart: Regular rate and rhythm without murmurs, rubs, or gallops.    Musculoskeletal with normal range of motion of right shoulder and no reproducible tenderness       Additional Information   Social History   Substance Use Topics     Smoking status: Never Smoker     Smokeless tobacco: Never Used     Alcohol use 0.6 oz/week     1 Glasses of wine per week      Comment: glass of wine with dinner              Kehinde Tate MD  "

## 2021-06-09 NOTE — PATIENT INSTRUCTIONS - HE
DISCHARGE INSTRUCTIONS    During office hours (8:00 a.m.- 4:00 p.m.) questions or concerns may be answered  by calling [facility] Navigation Nurses at  [phone].  Messages received after hours will be returned the following business day.      In the case of an emergency, please dial 911 or seek assistance at the nearest Emergency Room/Urgent Care facility.     All Patients:    ? You may experience an increase in your symptoms for the first 2 days (It may take anywhere between 2 days- 2 weeks for the steroid to have maximum effect).    ? You may use ice on the injection site, as frequently as 20 minutes each hour if needed.    ? You may take your pain medicine.    ? You may continue taking your regular medication after your injection. If you have had a Medial Branch Block you may resume pain medication once your pain diary is completed.    ? You may shower. No swimming, tub bath or hot tub for 48 hours.  You may remove your bandaid/bandage as soon as you are home.    ? You may resume light activities, as tolerated.    ? Resume your usual diet as tolerated.    ? It is strongly advised that you do not drive for 1-3 hours post injection.    ? If you have had oral sedation:  Do not drive for 8 hours post injection.      ? If you have had IV sedation:  Do not drive for 24 hours post injection.  Do not operate hazardous machinery or make important personal/business decisions for 24 hours.      POSSIBLE STEROID SIDE EFFECTS (If steroid/cortisone was used for your procedure)    -If you experience these symptoms, it should only last for a short period      Swelling of the legs                Skin redness (flushing)       Mouth (oral) irritation     Blood sugar (glucose) levels              Sweats                      Mood changes    Headache    Sleeplessness         POSSIBLE PROCEDURE SIDE EFFECTS  -Call the Sp[facility] Center if you are concerned    Increased Pain             Increased numbness/tingling         Nausea/Vomiting            Bruising/bleeding at site        Redness or swelling                                                Difficulty walking        Weakness             Fever greater than 100.5    *In the event of a severe headache after an epidural steroid injection that is relieved by lying down, please call the [facility] to speak with a clinical staff member*

## 2021-06-09 NOTE — TELEPHONE ENCOUNTER
Who is calling:  Daughter  Reason for Call:  Caller is questioning if a consent to communicate form can sent via My-Chart. Caller would like a call back.  Date of last appointment with primary care: n/a  Okay to leave a detailed message: Yes  [phone]

## 2021-06-09 NOTE — TELEPHONE ENCOUNTER
Patient Returning Call  Reason for call:  Daughter returning call to the clinic.  Information relayed to patient:  Yes as instructed and she agrees with plan to have the communication form faxed and mailed.  Please fax this to [phone].  Patient has additional questions:  yes  If YES, what are your questions/concerns:  Please also mail  this to the patients address.   Okay to leave a detailed message?: Yes

## 2021-06-10 NOTE — TELEPHONE ENCOUNTER
Medication Question or Clarification  Who is calling: nikolai Sinha   What medication are you calling about (include dose and sig)?:    Disp  Refills  Start  End     lansoprazole (PREVACID) 30 MG capsule  180 capsule  3  1/26/2020      Sig - Route: Take 1 capsule (30 mg total) by mouth 2 (two) times a day. - Oral     Sent to pharmacy as: lansoprazole 30 mg capsule,delayed release (PREVACID)     E-Prescribing Status: Receipt confirmed by pharmacy (1/26/2020  7:59 AM CST)         Who prescribed the medication?: Kehinde Tate MD   What is your question/concern?: Caller stated that they are wanting to decrease it to just taking it 1 times a day instead of 2.   Requested Pharmacy: n/a  Okay to leave a detailed message?: Yes

## 2021-06-10 NOTE — TELEPHONE ENCOUNTER
Okay to try decreasing lansoprazole to only 30 mg once daily.  Okay to remove calcium and ICaps from her med list.

## 2021-06-10 NOTE — TELEPHONE ENCOUNTER
Medication Question or Clarification  Who is calling: pankaj   What medication are you calling about (include dose and sig)?:    Disp  Refills  Start  End     calcium carbonate-vitamin D3 (CALCIUM 600 + D,3,) 600 mg(1,500mg) -200 unit per tablet         Sig - Route: Take 1 tablet by mouth 2 (two) times a day. - Oral     Class: Historical Med        Disp  Refills  Start  End     vit A/vit C/vit E/zinc/copper (ICAPS AREDS ORAL)         Sig - Route: Take 1 capsule by mouth 2 (two) times a day. Noon and Evening - Oral     Class: Historical Med         Who prescribed the medication?: Kehinde Tate MD   What is your question/concern?: Caller is wanting to remove these on patient list.   Requested Pharmacy: n/a  Okay to leave a detailed message?: Yes

## 2021-06-10 NOTE — PROGRESS NOTES
Office Visit - Follow Up   Karen Arthur   82 y.o. female    Date of Visit: 5/5/2017    Chief Complaint   Patient presents with     Back Pain     Fatigue     poor appetite        Assessment and Plan   1. Chronic fatigue  Unclear etiology of complaint of fatigue.  Will proceed with metabolic evaluation.  May be side effect of higher dose of Exelon.  - HM2(CBC w/o Differential)  - Comprehensive Metabolic Panel    2. Chronic back pain  We will refer to physical therapy where she recalls having good benefit.  Back pain related to lumbar spondylosis  - Ambulatory referral to Physical Therapy    3. Dementia  Now on higher dose of Exelon patch and remains on Namenda.    4. Hypothyroidism  Monitor TSH while on Synthroid  - Thyroid Stimulating Hormone (TSH)    5. Essential hypertension  Blood pressure looks adequately controlled    6. Appetite loss  As above, nonspecific symptom and unclear etiology combined with fatigue.  Symptoms present just for the past week.  Could be viral illness.  Metabolic evaluation underway.  Could be side effect of Exelon.  Will need further evaluation if symptoms persist    Return in about 3 months (around 8/5/2017) for Annual physical.     History of Present Illness   This 82 y.o. 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  She has been experiencing increasing low back pain.  It has been a chronic problem and she has participated in physical therapy in the past.  She still does her home exercises but her back is starting to bother her more.  No recent fall or injury.  Nothing radiating into her legs.  Over the last several days, she has had loss of appetite and increasing fatigue.  No upper respiratory infection symptoms.  She has a change in taste.  Her weight is noted to be down 2 pounds since I last saw her.  Her Exelon patch was increased at that visit.  She has ongoing chronic constipation.  She  is also taking iron and calcium supplements.  No melena or blood in her stools.  Denies any chest pain or palpitations and no increasing dyspnea or cough.    Review of Systems:  Otherwise, a comprehensive review of systems was negative except as noted.     Medications, Allergies and Problem List   Patient Active Problem List   Diagnosis     Osteoporosis     Hypothyroidism     Chronic back pain     Hyperlipidemia     Osteoarthritis of both hands     Vitamin D deficiency     Gastritis     RLS (restless legs syndrome)     Osteoarthritis, hip, bilateral     Chronic constipation     Chronic anemia     Dementia     Near syncope     Essential hypertension     NSVT (nonsustained ventricular tachycardia)     GERD (gastroesophageal reflux disease)     Nonischemic cardiomyopathy     Pulmonary nodules     Chronic right shoulder pain     Chronic fatigue     Appetite loss       She has a past surgical history that includes Lumbar laminectomy (2005); Cataract extraction, bilateral; Cholecystectomy (2007); Hemorroidectomy; Dilation and curettage of uterus; Tonsillectomy; Colonoscopy (2014); ORIF hip fracture (2015); and Breast lumpectomy.    Allergies   Allergen Reactions     Aricept [Donepezil]      Leg cramps       Lisinopril      Hypotension     Statins-Hmg-Coa Reductase Inhibitors Myalgia     Sulfa (Sulfonamide Antibiotics) Rash       Current Outpatient Prescriptions   Medication Sig Dispense Refill     acetaminophen (TYLENOL) 500 MG tablet Take 1,000 mg by mouth 3 (three) times a day.       aspirin 325 MG EC tablet Take 325 mg by mouth daily.       calcium carbonate-vitamin D2 500 mg(1,250mg) -200 unit tablet Take 1 tablet by mouth 2 (two) times a day.       carvedilol (COREG) 12.5 MG tablet Take 1 tablet (12.5 mg total) by mouth 2 (two) times a day with meals. 180 tablet 3     diphenhydrAMINE (BENADRYL) 25 mg tablet Take 50 mg by mouth bedtime.       ergocalciferol (VITAMIN D2) 50,000 unit capsule Take 1 capsule (50,000 Units  "total) by mouth once a week. 13 capsule 3     ferrous sulfate 325 (65 FE) MG tablet Take 1 tablet (325 mg total) by mouth 3 (three) times a week.  0     lansoprazole (PREVACID) 30 MG capsule Take 1 capsule (30 mg total) by mouth 2 (two) times a day. 180 capsule 3     levothyroxine (SYNTHROID, LEVOTHROID) 100 MCG tablet Take 1 tablet (100 mcg total) by mouth daily. 90 tablet 3     losartan (COZAAR) 50 MG tablet Take 1 tablet (50 mg total) by mouth bedtime. 90 tablet 3     memantine (NAMENDA) 10 MG tablet Take 1 tablet (10 mg total) by mouth 2 (two) times a day. 180 tablet 3     nabumetone (RELAFEN) 750 MG tablet Take 1 tablet (750 mg total) by mouth daily. 90 tablet 3     polyethylene glycol (MIRALAX) 17 gram packet Take 17 g by mouth daily as needed.        rivastigmine (EXELON) 9.5 mg/24 hr Place 1 patch on the skin daily. 30 patch 12     TURMERIC ROOT EXTRACT ORAL Take 350 mg by mouth 2 (two) times a day.       Current Facility-Administered Medications   Medication Dose Route Frequency Provider Last Rate Last Dose     denosumab 60 mg (PROLIA 60 mg/ml)  60 mg Subcutaneous Q6 Months Kehinde Tate MD   60 mg at 04/04/17 1343        Physical Exam   General Appearance:   Well-appearing elderly woman    /80 (Patient Site: Right Arm, Patient Position: Sitting, Cuff Size: Adult Large)  Pulse 64  Ht 5' 3\" (1.6 m)  Wt 125 lb (56.7 kg)  SpO2 98%  BMI 22.14 kg/m2    HEENT: Normal  Respiratory: Normal respiratory effort.  Lungs are clear with no rales or wheezes.  Heart: Regular rate and rhythm  Extremities: No peripheral edema.  Neurologic: Grossly nonfocal  Skin: No cyanosis or pallor           Additional Information   Social History   Substance Use Topics     Smoking status: Never Smoker     Smokeless tobacco: Never Used     Alcohol use 0.6 oz/week     1 Glasses of wine per week      Comment: glass of wine with dinner            Kehinde Tate MD  "

## 2021-06-10 NOTE — TELEPHONE ENCOUNTER
Dr. Tate,  Patient would like the following medications removed from her medication list: calcium 600 +D3 and ICAPS.  Okay to remove from her med list?    Patient would also like to decrease her lansoprazole from 30 mg two times a day to just once per day.  Please advise.  Thank you.  Moni KENNEY CMA/ILA....................4:14 PM

## 2021-06-10 NOTE — TELEPHONE ENCOUNTER
Refill Approved    Rx renewed per Medication Renewal Policy. Medication was last renewed on 11/22/19.    Kami Blackwell, [facility] Triage/Med Refill 7/28/2020     Requested Prescriptions   Pending Prescriptions Disp Refills     levothyroxine (SYNTHROID, LEVOTHROID) 100 MCG tablet [Pharmacy Med Name: Levothyroxine Sodium Oral Tablet 100 MCG] 90 tablet 0     Sig: Take 1 tablet (100 mcg total) by mouth daily       Thyroid Hormones Protocol Passed - 7/25/2020  4:52 PM        Passed - Provider visit in past 12 months or next 3 months     Last office visit with prescriber/PCP: 7/7/2020 Kehinde Tate MD OR same dept: 7/7/2020 Kehinde Tate MD OR same specialty: 7/7/2020 Kehinde Tate MD  Last physical: 12/30/2019 Last MTM visit: Visit date not found   Next visit within 3 mo: Visit date not found  Next physical within 3 mo: Visit date not found  Prescriber OR PCP: Kehinde Tate MD  Last diagnosis associated with med order: 1. Hypothyroidism, unspecified type  - levothyroxine (SYNTHROID, LEVOTHROID) 100 MCG tablet [Pharmacy Med Name: Levothyroxine Sodium Oral Tablet 100 MCG]; Take 1 tablet (100 mcg total) by mouth daily.  Dispense: 90 tablet; Refill: 0    If protocol passes may refill for 12 months if within 3 months of last provider visit (or a total of 15 months).             Passed - TSH on file in past 12 months for patient age 12 & older     TSH   Date Value Ref Range Status   07/07/2020 0.28 (L) 0.30 - 5.00 uIU/mL Final

## 2021-06-12 NOTE — PROGRESS NOTES
Assessment and Plan:       1. Medicare annual wellness visit, subsequent  Immunizations are reviewed and are up-to-date.  Living will discussed.  Non-smoker.  Uses alcohol in moderation.  She is getting regular exercise.  Up to date with colonoscopies and I would not recommend further.  Breast exam completed and she will continue to get a mammogram annually.    Last DEXA was 2015 and this should be repeated this year. Dementia and depression screening completed.  She sees an ophthalmologist every year and gets glaucoma screening.  Skin exam performed and recommending regular use of sunblock.    Will screen for diabetes with fasting glucose.      2. Dementia  Continues on Exelon patch and Namenda.  Stable.  Will repeat slums evaluation in 6 months at return.    3. Osteoporosis  Has been on Prolia since July 2015.  She had her fourth injection this spring.  Repeat DEXA this summer to confirm improvement.  She tries to get adequate calcium and vitamin D.  - DXA Bone Density Scan; Future    4. Pulmonary nodules  Likely benign pulmonary nodules on CT scan January but six-month follow-up is needed  - CT Chest Without Contrast; Future    5. Vitamin D deficiency  Recheck vitamin D level on replacement taking 50,000 units weekly  - Vitamin D, Total (25-Hydroxy)    6. Essential hypertension  Excellent blood pressure control with current medication  - Comprehensive Metabolic Panel  - Urinalysis    7. Hyperlipidemia  Recheck lipid profile.  Not currently on a statin  - Lipid Cascade    8. Hypothyroidism  Monitor TSH while on Synthroid  - Thyroid Stimulating Hormone (TSH)    9. Chronic anemia  Monitor hemoglobin  - Hemoglobin    10. Nonischemic cardiomyopathy  Echocardiogram yesterday showing improved left ventricular systolic function with ejection fraction of 50%.  She will follow-up with cardiology.    11. Chronic back pain  Good result after participating in physical therapy at [facility] and she continues home exercises.   Using nabumetone to manage pain    12. GERD (gastroesophageal reflux disease)  Overall stable with current dose of lansoprazole.  She will still get an episode monthly where she will get nausea and vomiting.  Unclear etiology      The patient's current medical problems were reviewed.    Over 25 minutes was spent addressing these chronic and new medical problems beyond time spent performing annual wellness visit with over 50% of the time spent counseling and coordination of care    I have had an Advance Directives discussion with the patient.  The following health maintenance schedule was reviewed with the patient and provided in printed form in the after visit summary:   Health Maintenance   Topic Date Due     DXA SCAN  03/16/2000     INFLUENZA VACCINE RULE BASED (1) 08/01/2017     FALL RISK ASSESSMENT  08/01/2018     TD 18+ HE  06/08/2022     ADVANCE DIRECTIVES DISCUSSED WITH PATIENT  08/01/2022     PNEUMOCOCCAL POLYSACCHARIDE VACCINE AGE 65 AND OVER  Completed     PNEUMOCOCCAL CONJUGATE VACCINE FOR ADULTS (PCV13 OR PREVNAR)  Completed     ZOSTER VACCINE  Completed        Subjective:   Chief Complaint: Karen Arthur is an 82 y.o. female here for an Annual Wellness visit.   HPI: In addition to wellness visit, discussed multiple chronic medical problems.  She has dementia and remains on Namenda and Exelon patch.  She is still able to perform all of her usual activities of daily living although her  is taking over food preparation and some other household chores.  No side effects from the medicine.  We discussed her chronic low back pain.  She participated in physical therapy this spring with good results.  He continues to exercises at home and pain is more manageable.  She is also on nabumetone 750 mg twice daily.  We discussed her osteoporosis.  She has been on Prolia since July 2015.  She will need a follow-up DEXA.  Also tries to get adequate calcium and is on vitamin D replacement.  She remains on  Synthroid for hypothyroidism.  Was concerns about decreased appetite at her last visit but her weight is noted to be stable.  She does mention some decreased smell as well.  Some concerns with nonischemic cardiomyopathy but echocardiogram yesterday showing improved left ventricular systolic function with ejection fraction of 50%.  She has had no recurrent syncopal episodes.    Review of Systems:    Please see above.  The rest of the review of systems are negative for all systems.    Patient Care Team:  Kehinde Tate MD as PCP - General (Internal Medicine)     Patient Active Problem List   Diagnosis     Osteoporosis     Hypothyroidism     Chronic back pain     Hyperlipidemia     Osteoarthritis of both hands     Vitamin D deficiency     Gastritis     RLS (restless legs syndrome)     Osteoarthritis, hip, bilateral     Chronic constipation     Chronic anemia     Dementia     Near syncope     Essential hypertension     NSVT (nonsustained ventricular tachycardia)     GERD (gastroesophageal reflux disease)     Pulmonary nodules     Chronic right shoulder pain     Chronic fatigue     Nonischemic cardiomyopathy     Past Medical History:   Diagnosis Date     Appetite loss 5/5/2017     Benign microscopic hematuria     neg workup     Chest pain 11/2016    Hospitalization with chest pain, normal pharmacologic nuclear stress test, GERD suspected     Chronic anemia 1/4/2016     Chronic back pain      Chronic constipation      Chronic fatigue 5/5/2017     Chronic right shoulder pain 4/4/2017     Dementia 07/28/2016    SLUMS 20 out of 30 in July 2016 started Namenda earlier this year     Diverticulosis      Eczema of scalp      Gallstones      Gastritis 2015    NSAIDs     GERD (gastroesophageal reflux disease) 11/8/2016    Responsible for recurrent chest pain      Hip fracture 2015    ORIF hip fracture 2015     HTN (hypertension)      Hyperlipidemia      Hypothyroidism      Lumbar radiculopathy     SHERIF 2014 L1-L2     Near  syncope 10/3/2016     Nonischemic cardiomyopathy     Echocardiogram November 2016 with ejection fraction 35% with global moderate reduction in left ventricular systolic function, aortic sclerosis without significant aortic stenosis, however, cardiac MRI with ejection fraction 45% December 2016 which is more consistent with findings on nuclear stress test, follow-up echocardiogram July 2017 with improved ejection fraction of 50%     NSVT (nonsustained ventricular tachycardia) 11/8/2016    Event monitor 2016     Occipital neuritis 2011     Osteoarthritis of both hands      Osteoarthritis, hip, bilateral      Osteoporosis     started Prolia 7/20/15 after hip fx Tscore -1.9     Pulmonary nodules 01/05/2017    5 mm nodules bilateral apices, indeterminate but follow-up CT scan in 6 months to confirm stability     RLS (restless legs syndrome)      Vitamin D deficiency       Past Surgical History:   Procedure Laterality Date     BREAST LUMPECTOMY       CATARACT EXTRACTION, BILATERAL       CHOLECYSTECTOMY  2007     COLONOSCOPY  2014     DILATION AND CURETTAGE OF UTERUS       HEMORROIDECTOMY       LUMBAR LAMINECTOMY  2005    L3 - L4     ORIF HIP FRACTURE  2015     TONSILLECTOMY        Family History   Problem Relation Age of Onset     No Medical Problems Mother      age 96     Heart attack Father      Sudden death Father 71     No Medical Problems Sister      No Medical Problems Brother      No Medical Problems Brother       Social History     Social History     Marital status:      Spouse name: N/A     Number of children: 6     Years of education: N/A     Occupational History     Homemaker      Social History Main Topics     Smoking status: Never Smoker     Smokeless tobacco: Never Used     Alcohol use 0.6 oz/week     1 Glasses of wine per week      Comment: glass of wine with dinner     Drug use: No     Sexual activity: No     Other Topics Concern     Not on file     Social History Narrative      Current Outpatient  "Prescriptions   Medication Sig Dispense Refill     acetaminophen (TYLENOL) 500 MG tablet Take 1,000 mg by mouth 3 (three) times a day.       aspirin 325 MG EC tablet Take 325 mg by mouth daily.       calcium carbonate-vitamin D2 500 mg(1,250mg) -200 unit tablet Take 1 tablet by mouth 2 (two) times a day.       carvedilol (COREG) 12.5 MG tablet Take 1 tablet (12.5 mg total) by mouth 2 (two) times a day with meals. 180 tablet 3     diphenhydrAMINE (BENADRYL) 25 mg tablet Take 50 mg by mouth bedtime.       ergocalciferol (VITAMIN D2) 50,000 unit capsule Take 1 capsule (50,000 Units total) by mouth once a week. 13 capsule 3     ferrous sulfate 325 (65 FE) MG tablet Take 1 tablet (325 mg total) by mouth 3 (three) times a week.  0     lansoprazole (PREVACID) 30 MG capsule Take 1 capsule (30 mg total) by mouth 2 (two) times a day. 180 capsule 3     levothyroxine (SYNTHROID, LEVOTHROID) 100 MCG tablet Take 1 tablet (100 mcg total) by mouth daily. 90 tablet 3     losartan (COZAAR) 50 MG tablet Take 1 tablet (50 mg total) by mouth at bedtime. 90 tablet 3     memantine (NAMENDA) 10 MG tablet Take 1 tablet (10 mg total) by mouth 2 (two) times a day. 180 tablet 3     nabumetone (RELAFEN) 750 MG tablet Take 1 tablet (750 mg total) by mouth 2 (two) times a day. 180 tablet 3     polyethylene glycol (MIRALAX) 17 gram packet Take 17 g by mouth daily as needed.        rivastigmine (EXELON) 9.5 mg/24 hr Place 1 patch on the skin daily. 30 patch 12     TURMERIC ROOT EXTRACT ORAL Take 350 mg by mouth 2 (two) times a day.       Current Facility-Administered Medications   Medication Dose Route Frequency Provider Last Rate Last Dose     denosumab 60 mg (PROLIA 60 mg/ml)  60 mg Subcutaneous Q6 Months Kehinde Tate MD   60 mg at 04/04/17 1343      Objective:   Vital Signs:   Visit Vitals     /80 (Patient Site: Right Arm, Patient Position: Sitting, Cuff Size: Adult Regular)     Pulse 65     Ht 5' 3\" (1.6 m)     Wt 121 lb (54.9 kg) "     SpO2 98%     BMI 21.43 kg/m2            PHYSICAL EXAM  EYES: Eyelids, conjunctiva, and sclera were normal. Pupils were normal. Cornea, iris, and lens were normal bilaterally.  HEAD, EARS, NOSE, MOUTH, AND THROAT: Head and face were normal. Nose appearance was normal and there was no discharge. Oropharynx was normal.  NECK: Neck appearance was normal. There were no neck masses and the thyroid was not enlarged and no nodules are felt.  No lymphadenopathy.  RESPIRATORY: Breathing pattern was normal and the chest moved symmetrically.  Percussion/auscultatory percussion was normal.  Lung sounds were normal and there were no rales or wheezes.  CARDIOVASCULAR: Heart rate and rhythm were normal.  S1 and S2 were normal and there were no extra sounds or murmurs. Peripheral pulses in arms and legs were normal.  Jugular venous pressure was normal.  There was no peripheral edema.  No carotid bruits.  BREAST: No palpable masses or tenderness.  No axillary nodes.  GASTROINTESTINAL: The abdomen was normal in contour.  Bowel sounds were present.   Palpation detected no tenderness, mass, or enlarged organs.   MUSCULOSKELETAL: Skeletal configuration was normal and muscle mass was normal for age. Joint appearance was overall normal.  LYMPHATIC: There were no enlarged nodes.  SKIN/HAIR/NAILS: Skin color was normal.  Hair and nails were normal.There were no skin lesions.  NEUROLOGIC: The patient was alert and oriented to person, place, time, and circumstance. Speech was normal. Cranial nerves were normal. Motor strength was normal for age. The patient was normally coordinated.  Sensation intact.  PSYCHIATRIC:  Mood and affect were normal PHQ 9 score is 2    Assessment Results 8/1/2017   Activities of Daily Living No help needed   Instrumental Activities of Daily Living No help needed   Get Up and Go Score Less than 12 seconds   Mini Cog Total Score 3   Some recent data might be hidden     A Mini-Cog score of 0-2 suggests the  possibility of dementia, score of 3-5 suggests no dementia    Identified Health Risks:     Information regarding advance directives (living gray), including where she can download the appropriate form, was provided to the patient via the AVS.

## 2021-06-12 NOTE — TELEPHONE ENCOUNTER
RN cannot approve Refill Request    RN can NOT refill this medication med is not covered by policy/route to provider. Last office visit: 7/7/2020 Kehinde Tate MD Last Physical: 12/30/2019 Last MTM visit: Visit date not found Last visit same specialty: 7/7/2020 Kehinde Tate MD.  Next visit within 3 mo: Visit date not found  Next physical within 3 mo: Visit date not found      Robyn Goddard, [facility] Triage/Med Refill 11/7/2020    Requested Prescriptions   Pending Prescriptions Disp Refills     nabumetone (RELAFEN) 750 MG tablet [Pharmacy Med Name: Nabumetone Oral Tablet 750 MG] 60 tablet 0     Sig: Take 1 tablet (750 mg total) by mouth 2 (two) times a day.       There is no refill protocol information for this order

## 2021-06-12 NOTE — TELEPHONE ENCOUNTER
Refill Approved    Rx renewed per Medication Renewal Policy. Medication was last renewed on 10/21/19.4/30/20    Kami Blackwell, [facility] Triage/Med Refill 10/13/2020     Requested Prescriptions   Pending Prescriptions Disp Refills     memantine (NAMENDA) 10 MG tablet [Pharmacy Med Name: Memantine HCl Oral Tablet 10 MG] 180 tablet 0     Sig: TAKE 1 TABLET BY MOUTH TWICE DAILY.       Cholinesterase Inhibitor/Anti-Alzheimer Agent Refill Protocol Passed - 10/10/2020  6:29 PM        Passed - Visit with PCP or prescribing provider visit in last 6 months or next 3 months     Last office visit with prescriber/PCP: 7/7/2020 OR same dept: 7/7/2020 Kehinde Tate MD OR same specialty: 7/7/2020 Kehinde Tate MD Last physical: Visit date not found Last MTM visit: Visit date not found     Next appt within 3 mo: Visit date not found  Next physical within 3 mo: Visit date not found  Prescriber OR PCP: Kehinde Tate MD  Last diagnosis associated with med order: 1. Dementia without behavioral disturbance, unspecified dementia type (H)  - memantine (NAMENDA) 10 MG tablet [Pharmacy Med Name: Memantine HCl Oral Tablet 10 MG]; TAKE 1 TABLET BY MOUTH TWICE DAILY.  Dispense: 180 tablet; Refill: 0    2. Nonischemic cardiomyopathy (H)  - carvediloL (COREG) 12.5 MG tablet [Pharmacy Med Name: Carvedilol Oral Tablet 12.5 MG]; Take 1 tablet (12.5 mg total) by mouth 2 (two) times a day.  Dispense: 180 tablet; Refill: 0    If protocol passes may refill for 6 months if within 3 months of last provider visit (or a total of 9 months).                 carvediloL (COREG) 12.5 MG tablet [Pharmacy Med Name: Carvedilol Oral Tablet 12.5 MG] 180 tablet 0     Sig: Take 1 tablet (12.5 mg total) by mouth 2 (two) times a day.       Beta-Blockers Refill Protocol Passed - 10/10/2020  6:29 PM        Passed - PCP or prescribing provider visit in past 12 months or next 3 months     Last office visit with prescriber/PCP: 7/7/2020  Kehinde Tate MD OR same dept: 7/7/2020 Kehinde Tate MD OR same specialty: 7/7/2020 Kehinde Tate MD  Last physical: 12/30/2019 Last MTM visit: Visit date not found   Next visit within 3 mo: Visit date not found  Next physical within 3 mo: Visit date not found  Prescriber OR PCP: Kehinde Tate MD  Last diagnosis associated with med order: 1. Dementia without behavioral disturbance, unspecified dementia type (H)  - memantine (NAMENDA) 10 MG tablet [Pharmacy Med Name: Memantine HCl Oral Tablet 10 MG]; TAKE 1 TABLET BY MOUTH TWICE DAILY.  Dispense: 180 tablet; Refill: 0    2. Nonischemic cardiomyopathy (H)  - carvediloL (COREG) 12.5 MG tablet [Pharmacy Med Name: Carvedilol Oral Tablet 12.5 MG]; Take 1 tablet (12.5 mg total) by mouth 2 (two) times a day.  Dispense: 180 tablet; Refill: 0    If protocol passes may refill for 12 months if within 3 months of last provider visit (or a total of 15 months).             Passed - Blood pressure filed in past 12 months     BP Readings from Last 1 Encounters:   08/17/20 128/70

## 2021-06-13 NOTE — PROGRESS NOTES
Karen Arthur presents for her annual flu vaccine. Consent form is reviewed and immunization given without incident. See immunization history for documentation details.     Chelsy Eduardo CSS

## 2021-06-14 NOTE — TELEPHONE ENCOUNTER
Refill request for medication: lansoprazole, qday instead of bid per pt encounter  Last visit addressing this medication: 7/7/20  Follow up plan 6  months  Last refill on 1/26/20, quantity #180   CSA completed Not applicable   checked Not applicable    Appointment: Appointment scheduled for 2/2/21     Carina Thomson, [facility]

## 2021-06-14 NOTE — PROGRESS NOTES
Office Visit - Follow Up   Karen Arthur   82 y.o. female    Date of Visit: 12/5/2017    Chief Complaint   Patient presents with     Hospital Visit Follow Up        Assessment and Plan   1. Nausea and vomiting  Recurrent episodes of vomiting of unclear etiology.  Evaluated by gastroenterology and has recommended better treatment of chronic constipation as outlined below.  Also holding Exelon patch.  EGD suggesting esophageal dysmotility.  Manometry scheduled for January.  She is not having any dysphagia.  This test can be postponed.    2. Dementia  Off of Exelon patch but will restart in January if symptoms are stable.  Will watch for any recurrence.    3. GERD (gastroesophageal reflux disease)  Stable with lansoprazole.  Esophageal dysmotility may be a contributing factor    4. Aspiration pneumonia  Completing 10 day course of Augmentin    5. Chronic constipation  We will continue MiraLAX but only once daily as she is having loose stools taking twice daily.    Return in about 3 months (around 3/5/2018) for Recheck.     History of Present Illness   This 82 y.o. old woman with dementia who is here to follow-up after recent hospitalization with intractable vomiting.  For the past year, she has been having recurrent episodes of nausea and vomiting.  It will usually start abruptly and lasts for 1 or 2 hours and then resolved.  She will then be fine for weeks before symptoms recur.  No associated abdominal pain.  Workup in the hospital included CT scan abdomen/pelvis which was unremarkable.  She was diagnosed with aspiration pneumonia and treated with IV Zosyn.  Discharged with Augmentin and is completing the 10 day course.  Cough has resolved.  Arrangements made for outpatient evaluation with gastroenterology which was completed last week.  She underwent EGD and this was reviewed.  Some dilation of esophagus suggesting esophageal dysmotility problem but stomach looked normal.  Biopsies taken and are pending.    Johnathan recommended treatment of chronic constipation which may be responsible for her symptoms.  She completed a bowel prep last Friday with MiraLAX and is now taking it twice daily.  Stools are occurring daily but are loose.  I also discontinued her Exelon patch.  She has had no recurrent nausea or vomiting.  Significant problem maintaining her weight.  She is down 2 pounds from 3 months ago.  Hopefully her appetite is somewhat better.    Review of Systems:  Otherwise, a comprehensive review of systems was negative except as noted.     Medications, Allergies and Problem List   Patient Active Problem List   Diagnosis     Hypothyroidism     Chronic back pain     Hyperlipidemia     Osteoarthritis of both hands     Vitamin D deficiency     Gastritis     RLS (restless legs syndrome)     Osteoarthritis, hip, bilateral     Chronic constipation     Chronic anemia     Dementia     Near syncope     Essential hypertension     NSVT (nonsustained ventricular tachycardia)     GERD (gastroesophageal reflux disease)     Chronic right shoulder pain     Chronic fatigue     Nonischemic cardiomyopathy     Osteoporosis     Hyponatremia     Moderate protein-calorie malnutrition     Nausea and vomiting       She has a past surgical history that includes Lumbar laminectomy (2005); Cataract extraction, bilateral; Cholecystectomy (2007); Hemorroidectomy; Dilation and curettage of uterus; Tonsillectomy; Colonoscopy (2014); ORIF hip fracture (2015); and Breast lumpectomy.    Allergies   Allergen Reactions     Aricept [Donepezil]      Leg cramps       Lisinopril      Hypotension     Statins-Hmg-Coa Reductase Inhibitors Myalgia     Sulfa (Sulfonamide Antibiotics) Rash       Current Outpatient Prescriptions   Medication Sig Dispense Refill     acetaminophen (TYLENOL) 500 MG tablet Take 1,000 mg by mouth 3 (three) times a day.       amoxicillin-clavulanate (AUGMENTIN) 875-125 mg per tablet Take 1 tablet by mouth 2 (two) times a day for 9 days. 18  "tablet 0     aspirin 325 MG EC tablet Take 325 mg by mouth daily.       calcium carbonate-vitamin D2 500 mg(1,250mg) -200 unit tablet Take 1 tablet by mouth 2 (two) times a day.       carvedilol (COREG) 12.5 MG tablet Take 1 tablet (12.5 mg total) by mouth 2 (two) times a day with meals. 180 tablet 3     diphenhydrAMINE (BENADRYL) 25 mg tablet Take 50 mg by mouth bedtime.       ergocalciferol (VITAMIN D2) 50,000 unit capsule Take 1 capsule (50,000 Units total) by mouth once a week. (Patient taking differently: Take 50,000 Units by mouth once a week. Weekly on Mondays) 13 capsule 3     ferrous sulfate 325 (65 FE) MG tablet Take 1 tablet (325 mg total) by mouth 3 (three) times a week. (Patient taking differently: Take 1 tablet by mouth daily with breakfast. )  0     lansoprazole (PREVACID) 30 MG capsule Take 1 capsule (30 mg total) by mouth 2 (two) times a day. 180 capsule 2     levothyroxine (SYNTHROID, LEVOTHROID) 100 MCG tablet Take 1 tablet (100 mcg total) by mouth daily. 90 tablet 2     losartan (COZAAR) 50 MG tablet Take 1 tablet (50 mg total) by mouth at bedtime. 90 tablet 3     memantine (NAMENDA) 10 MG tablet Take 1 tablet (10 mg total) by mouth 2 (two) times a day. 180 tablet 3     nabumetone (RELAFEN) 750 MG tablet Take 1 tablet (750 mg total) by mouth 2 (two) times a day. 180 tablet 3     polyethylene glycol (MIRALAX) 17 gram packet Take 17 g by mouth daily as needed.        TURMERIC ROOT EXTRACT ORAL Take 300 mg by mouth daily.        Current Facility-Administered Medications   Medication Dose Route Frequency Provider Last Rate Last Dose     denosumab 60 mg (PROLIA 60 mg/ml)  60 mg Subcutaneous Q6 Months Kehinde Tate MD   60 mg at 10/10/17 1030        Physical Exam   General Appearance:   Pleasant elderly woman in no distress    /60 (Patient Site: Left Arm, Patient Position: Sitting, Cuff Size: Adult Regular)  Pulse 60  Ht 5' 4\" (1.626 m)  Wt 120 lb (54.4 kg)  SpO2 97%  BMI 20.6 " kg/m2    HEENT: Normal  Respiratory: Normal respiratory effort.  Lungs are clear with no rales or wheezes.  Heart: Regular rate and rhythm   Abdomen: Abdomen is soft, nontender without guarding, rebound, masses, or hepatosplenomegaly.         Additional Information   Social History   Substance Use Topics     Smoking status: Never Smoker     Smokeless tobacco: Never Used     Alcohol use 0.6 oz/week     1 Glasses of wine per week      Comment: glass of wine with dinner              Kehinde Tate MD

## 2021-06-14 NOTE — TELEPHONE ENCOUNTER
Left message for Karen ([facility]) that it is best if patient can come into clinic since we typically do lab work. I recommended calling back with any concerns and we could discuss other options.  Aracely Brown CMA ............... 10:32 AM, 02/01/21

## 2021-06-14 NOTE — PROGRESS NOTES
Assessment and Plan:     Patient has been advised of split billing requirements and indicates understanding: Yes     1. Medicare annual wellness visit, subsequent  Immunizations are reviewed and everything is up-to-date.  She is scheduled for her first Covid vaccine this week.  She has a living will and this will be filed in her chart today.  Non-smoker.  Uses alcohol in moderation.  Regular exercise discussed.  No further colonoscopies are indicated at her age and with her comorbidities.  She declines having further mammograms and this is a reasonable decision.    Last DEXA was 2017 and this should be repeated this year.  Depression screening completed.  Recommending that she sees an ophthalmologist every year. Skin exam performed and recommending regular use of sunblock.    Will screen for diabetes with fasting glucose.      2. Late onset Alzheimer's disease without behavioral disturbance (H)  Alzheimer's dementia with gradual progression.  She is needing help with several ADLs but is still able to get herself dressed every morning and taking care of her toileting and hygiene needs.  Has been unable to tolerate Aricept or Exelon but continues on Namenda 10 mg twice daily.   is present throughout the day providing 24-hour supervision.    3. Cardiomyopathy, nonischemic (H)  Last echocardiogram demonstrating improved left ventricular systolic function.  Asymptomatic denying any increasing dyspnea or peripheral edema.    4. NSVT (nonsustained ventricular tachycardia) (H)  Continues on carvedilol    5. History of CVA (cerebrovascular accident)  Hospitalized in early 2020 with CVA following coronary angiogram.  She continues on aspirin 325 mg daily along with rosuvastatin.  Good blood pressure control.  No recurrent TIA or CVA symptoms.    6. ASCVD (arteriosclerotic cardiovascular disease)  Coronary angiogram demonstrating moderate coronary artery disease.  Continue medical management with aspirin, statin and  good blood pressure control.  Asymptomatic without exertional chest pain.    7. Spinal stenosis of lumbar region with neurogenic claudication  Chronic severe low back pain from combination of scoliosis and severe degenerative changes with severe central canal stenosis at L3-L4 and bilateral severe foraminal stenosis.  Has been evaluated at the spine clinic.  Last seen in July 2020 with SHERIF at L3-L4 performed at that time.  Her pain is currently tolerable and she manages with combination of scheduled acetaminophen and nabumetone 750 mg twice daily.    8. Age-related osteoporosis without current pathological fracture  History of hip fracture.  She completed 5 years of Prolia in February 2020.  She should continue to maintain good calcium and vitamin D intake and will make arrangements for repeating DEXA later this spring to assess response to Prolia.    9. Vitamin D deficiency  Recheck vitamin D level continues on replacement taking 50,000 units weekly  - Vitamin D, Total (25-Hydroxy)    10. Essential hypertension  Excellent blood pressure control with current medication  - Comprehensive Metabolic Panel  - Urinalysis-UC if Indicated    11. Hypothyroidism, unspecified type  Recheck TSH and adjust dose of Synthroid as necessary  - Thyroid Stimulating Hormone (TSH)    12. Hyperlipidemia, unspecified hyperlipidemia type  Recheck lipid profile on rosuvastatin  - Lipid Cascade FASTING    13. Gastroesophageal reflux disease without esophagitis  Well-controlled with lansoprazole    14. Bilateral impacted cerumen  We will refer to ENT  - Ambulatory referral to ENT    15. Chronic anemia    - HM2(CBC w/o Differential)    16. History of skin cancer  We discussed following up with her dermatologist every year    17. Postmenopausal    - DXA Bone Density Scan; Future    18.  Cold intolerance-we will screen for anemia and make sure current dose of Synthroid is appropriate.  May be side effect of her beta-blocker, carvedilol.    Over  30 minutes was spent addressing these chronic and new medical problems beyond time spent performing annual wellness visit with over 50% of the time spent counseling and coordination of care    The patient's current medical problems were reviewed.    I have had an Advance Directives discussion with the patient.  The following health maintenance schedule was reviewed with the patient and provided in printed form in the after visit summary:   Health Maintenance   Topic Date Due     COVID-19 Vaccine (1 of 2) 03/16/1951     MEDICARE ANNUAL WELLNESS VISIT  02/02/2022     FALL RISK ASSESSMENT  02/02/2022     TD 18+ HE  06/08/2022     ADVANCE CARE PLANNING  02/12/2025     Pneumococcal Vaccine: 65+ Years  Completed     INFLUENZA VACCINE RULE BASED  Completed     ZOSTER VACCINES  Completed     Pneumococcal Vaccine: Pediatrics (0 to 5 Years) and At-Risk Patients (6 to 64 Years)  Aged Out     HEPATITIS B VACCINES  Aged Out        Subjective:   Chief Complaint: Karen rAthur is an 85 y.o. female here for an Annual Wellness visit.   HPI: In addition to her annual wellness visit, multiple chronic medical problems were addressed during today's visit.  See assessment and plan for details.    Review of Systems:    Please see above.  The rest of the review of systems are negative for all systems.    Patient Care Team:  Kehinde Tate MD as PCP - General (Internal Medicine)  Kehinde Tate MD as Assigned PCP  Norberto Ochoa, PharmD as Pharmacist (Pharmacist)  Yan Hickman MD as Assigned Heart and Vascular Provider     Patient Active Problem List   Diagnosis     Hypothyroidism     Chronic back pain     Hyperlipidemia     Osteoarthritis of both hands     Vitamin D deficiency     Gastritis     RLS (restless legs syndrome)     Osteoarthritis, hip, bilateral     Chronic constipation     Chronic anemia     Alzheimer's dementia without behavioral disturbance (H)     Essential hypertension     NSVT (nonsustained  ventricular tachycardia) (H)     GERD (gastroesophageal reflux disease)     Chronic right shoulder pain     Chronic fatigue     Cardiomyopathy, nonischemic (H)     Hyponatremia     Scoliosis of lumbar spine, unspecified scoliosis type     Low back pain radiating to both legs     History of skin cancer     Near syncope     Abnormal cardiovascular stress test     Decreased left ventricular function     Right foot pain     Chronic insomnia     ASCVD (arteriosclerotic cardiovascular disease)     History of CVA (cerebrovascular accident)     Lumbar spinal stenosis     Osteoporosis     Past Medical History:   Diagnosis Date     Acute CVA (cerebrovascular accident) (H) 02/14/2020    Hospitalized with acute CVA has complication of coronary angiogram with MRI showing acute/subacute infarcts     Appetite loss 05/05/2017     ASCVD (arteriosclerotic cardiovascular disease)     Coronary angiogram with moderate disease.  Left bundle branch block possibly responsible for decreased left ventricular systolic function versus frequent PVCs     Benign microscopic hematuria     neg workup     Chest pain 11/2016    Hospitalization with chest pain, normal pharmacologic nuclear stress test, GERD suspected     Chronic anemia 01/04/2016     Chronic back pain      Chronic constipation      Chronic fatigue 05/05/2017     Chronic insomnia 02/27/2020     Chronic right shoulder pain 04/04/2017     Dementia (H) 07/28/2016    SLUMS 20 out of 30 in July 2016 started Namenda earlier this year     Diverticulosis      Eczema of scalp      Gallstones      Gastritis 2015    NSAIDs     GERD (gastroesophageal reflux disease) 11/08/2016    Responsible for recurrent chest pain      Hip fracture (H) 2015    ORIF hip fracture 2015     History of CVA (cerebrovascular accident) 05/04/2020    Hospitalized with acute CVA has complication of coronary angiogram with MRI showing acute/subacute infarcts     History of skin cancer 12/30/2019     HTN (hypertension)       Hyperlipidemia      Hypothyroidism      Low back pain radiating to both legs 07/18/2019    MRI with severe central canal stenosis L3-L4     Lumbar radiculopathy     SHERIF 2014 L1-L2     Lumbar spinal stenosis     Low back pain with bilateral leg pain with MRI showing severe spinal stenosis L3-L4 and bilateral foraminal stenosis.  SHERIF August 2019 and repeated July 2020     Moderate protein-calorie malnutrition (H) 11/27/2017     Nausea and vomiting 11/27/2017    Recurrent, occurring monthly, hospitalization November 2017, GI evaluation including EGD suggesting esophageal dysmotility but no other abnormalities.  Questioning whether secondary to chronic constipation and now using MiraLAX.  Also holding Exelon patch, mildly elevated lipase of unclear significance March 2018     Near syncope 10/03/2016    Abnormal pharmacologic nuclear stress test with small area of infarction involving anterior wall with hypokinesis and decreased left ventricular systolic function.  Unclear if artifactual related to LBBB or recent MI     Nonischemic cardiomyopathy (H)     Echocardiogram November 2016 with ejection fraction 35% with global moderate reduction in left ventricular systolic function, aortic sclerosis without significant aortic stenosis, however, cardiac MRI with ejection fraction 45% December 2016 which is more consistent with findings on nuclear stress test, follow-up echocardiogram July 2017 with improved ejection fraction of 50%.       NSVT (nonsustained ventricular tachycardia) (H) 11/08/2016    Event monitor 2016     Occipital neuritis 2011     Osteoarthritis of both hands      Osteoarthritis, hip, bilateral      Osteoporosis     started Prolia 7/20/15 after hip fx Tscore -1.9, fourth injection given April 4, 2017, follow-up DEXA August 2017 T score -1.6 involving left hip suggesting improvement, -2.4 distal radius.  Completed Prolia February 2020     Pulmonary nodules 01/05/2017    5 mm nodules bilateral apices,  indeterminate but follow-up scan July 2017 unchanged.  Appeared to be benign and no further imaging required     Right foot pain 02/27/2020    Sore developing on lateral aspect of foot.  Now with wider shoes.  Podiatry referral if not improving     RLS (restless legs syndrome)      Scoliosis of lumbar spine, unspecified scoliosis type 12/21/2018     Skin lesion of right ear 12/21/2018     Vitamin D deficiency       Past Surgical History:   Procedure Laterality Date     BREAST LUMPECTOMY       CATARACT EXTRACTION, BILATERAL       CHOLECYSTECTOMY  2007     COLONOSCOPY  2014     CV CORONARY ANGIOGRAM N/A 2/13/2020    Procedure: Coronary Angiogram;  Surgeon: Yan Hickman MD;  Location: [facility] Cath Lab;  Service: Cardiology     CV LEFT HEART CATHETERIZATION WO LEFT VETRICULOGRAM Left 2/13/2020    Procedure: Left Heart Catheterization Without Left Ventriculogram;  Surgeon: Yan Hickman MD;  Location: [facility] Cath Lab;  Service: Cardiology     DILATION AND CURETTAGE OF UTERUS       HEMORROIDECTOMY       LUMBAR LAMINECTOMY  2005    L3 - L4     ORIF HIP FRACTURE  2015     TONSILLECTOMY        Family History   Problem Relation Age of Onset     Rheumatologic disease Mother      Heart attack Father      Sudden death Father 71     No Medical Problems Sister      No Medical Problems Brother      No Medical Problems Brother       Social History     Socioeconomic History     Marital status:      Spouse name: Not on file     Number of children: 6     Years of education: Not on file     Highest education level: Not on file   Occupational History     Occupation: Homemaker   Social Needs     Financial resource strain: Not on file     Food insecurity     Worry: Not on file     Inability: Not on file     Transportation needs     Medical: Not on file     Non-medical: Not on file   Tobacco Use     Smoking status: Never Smoker     Smokeless tobacco: Never Used   Substance and Sexual Activity     Alcohol use: Yes      Alcohol/week: 1.0 standard drinks     Types: 1 Glasses of wine per week     Comment: glass of wine with dinner     Drug use: No     Sexual activity: Not on file   Lifestyle     Physical activity     Days per week: Not on file     Minutes per session: Not on file     Stress: Not on file   Relationships     Social connections     Talks on phone: Not on file     Gets together: Not on file     Attends [employer] service: Not on file     Active member of club or organization: Not on file     Attends meetings of clubs or organizations: Not on file     Relationship status: Not on file     Intimate partner violence     Fear of current or ex partner: Not on file     Emotionally abused: Not on file     Physically abused: Not on file     Forced sexual activity: Not on file   Other Topics Concern     Not on file   Social History Narrative     Not on file      Current Outpatient Medications   Medication Sig Dispense Refill     acetaminophen (TYLENOL) 500 MG tablet Take 1,000 mg by mouth 3 (three) times a day.       aspirin 325 MG EC tablet Take 325 mg by mouth daily.       carvediloL (COREG) 12.5 MG tablet Take 1 tablet (12.5 mg total) by mouth 2 (two) times a day. 180 tablet 2     lansoprazole (PREVACID) 30 MG capsule Take 1 capsule (30 mg total) by mouth daily. 90 capsule 3     levothyroxine (SYNTHROID) 88 MCG tablet Take 1 tablet (88 mcg total) by mouth daily. 90 tablet 3     losartan (COZAAR) 50 MG tablet Take 1 tablet (50 mg total) by mouth daily. 90 tablet 3     melatonin 3 mg cap Take 3 mg by mouth at bedtime.  0     memantine (NAMENDA) 10 MG tablet TAKE 1 TABLET BY MOUTH TWICE DAILY. 180 tablet 1     nabumetone (RELAFEN) 750 MG tablet Take 1 tablet (750 mg total) by mouth 2 (two) times a day. 60 tablet 11     polyethylene glycol (MIRALAX) 17 gram packet Take 17 g by mouth daily as needed.        rosuvastatin (CRESTOR) 20 MG tablet Take 1 tablet (20 mg total) by mouth at bedtime. 90 tablet 3     VITAMIN D2 1,250 mcg  "(50,000 unit) capsule TAKE 1 CAPSULE (50,000 UNITS TOTAL) BY MOUTH ONCE A WEEK. 12 capsule 3     No current facility-administered medications for this visit.       Objective:   Vital Signs:   Visit Vitals  /70 (Patient Site: Right Arm, Patient Position: Sitting, Cuff Size: Adult Regular)   Pulse 62   Temp (!) 96.5  F (35.8  C) (Tympanic)   Ht 5' 2.5\" (1.588 m)   Wt 120 lb (54.4 kg)   LMP  (LMP Unknown)   SpO2 98%   BMI 21.60 kg/m           VisionScreening:  No exam data present     PHYSICAL EXAM  EYES: Eyelids, conjunctiva, and sclera were normal. Pupils were normal. Cornea, iris, and lens were normal bilaterally.  HEAD, EARS, NOSE, MOUTH, AND THROAT: Head and face were normal.  Bilateral impacted cerumen.  NECK: Neck appearance was normal. There were no neck masses and the thyroid was not enlarged and no nodules are felt.  No lymphadenopathy.  RESPIRATORY: Breathing pattern was normal and the chest moved symmetrically.   Lung sounds were normal and there were no rales or wheezes.  CARDIOVASCULAR: Heart rate and rhythm were normal.  S1 and S2 were normal and there were no extra sounds or murmurs. Peripheral pulses in arms and legs were normal.  Jugular venous pressure was normal.  There was no peripheral edema.  No carotid bruits.  GASTROINTESTINAL:  Bowel sounds were present.   Palpation detected no tenderness, mass, or enlarged organs.   MUSCULOSKELETAL: Skeletal configuration was normal and muscle mass was normal for age. Joint appearance was overall normal.  LYMPHATIC: There were no enlarged nodes.  SKIN/HAIR/NAILS: Skin color was normal.  Hair and nails were normal.There were no skin lesions.  NEUROLOGIC:  Speech was normal. Cranial nerves were normal. Motor strength was normal for age. The patient was normally coordinated.  Sensation intact.  PSYCHIATRIC:  Mood and affect were normal.  She remains forgetful    Assessment Results 2/2/2021   Activities of Daily Living No help needed   Instrumental " Activities of Daily Living 2-4 - Moderate impairment   Get Up and Go Score Less than 12 seconds   Mini Cog Total Score -   Some recent data might be hidden     A Mini-Cog score of 0-2 suggests the possibility of dementia, score of 3-5 suggests no dementia    Identified Health Risks:     The patient was provided with suggestions to help her develop a healthy physical lifestyle.   The patient was counseled and encouraged to consider modifying their diet and eating habits. She was provided with information on recommended healthy diet options.  The patient reports that she has difficulty with instrumental activities of daily living.    This issue was addressed at today's appointment.   providing 24-hour supervision and assistance with activities of daily living  She is at risk for falling and has been provided with information to reduce the risk of falling at home.  Patient's advanced directive was discussed and I am comfortable with the patient's wishes.

## 2021-06-14 NOTE — TELEPHONE ENCOUNTER
Central PA team  [phone]  Pool: HE PA MED ([id number])          PA has been initiated.       PA form completed and faxed insurance via Cover My Meds     [place]:  MAX BEY (Key: [id number])      Medication:  lansoprazole (PREVACID) 30 MG capsule    Insurance:  [facility] FEP Blue        Response will be received via fax and may take up to 5-10 business days depending on plan

## 2021-06-14 NOTE — TELEPHONE ENCOUNTER
Patient returning call. Relayed msg though requesting to talk to care team. Pls call back on home # [phone].

## 2021-06-14 NOTE — TELEPHONE ENCOUNTER
Reason for Call:  Other call back      Detailed comments: please call daughter, Karen  To discuss if pt needs to come in for the clinic for her appt tomorrow.    Phone Number Patient can be reached at: Other phone number:  [phone]  Best Time:     Can we leave a detailed message on this number?: Yes    Call taken on 2/1/2021 at 8:25 AM by Fatemeh Naidu

## 2021-06-15 ENCOUNTER — COMMUNICATION - HEALTHEAST (OUTPATIENT)
Dept: INTERNAL MEDICINE | Facility: CLINIC | Age: 86
End: 2021-06-15

## 2021-06-15 DIAGNOSIS — I63.9 ACUTE CVA (CEREBROVASCULAR ACCIDENT) (H): ICD-10-CM

## 2021-06-15 PROBLEM — M25.511 CHRONIC RIGHT SHOULDER PAIN: Status: ACTIVE | Noted: 2017-04-04

## 2021-06-15 PROBLEM — R53.82 CHRONIC FATIGUE: Status: ACTIVE | Noted: 2017-05-05

## 2021-06-15 PROBLEM — G89.29 CHRONIC RIGHT SHOULDER PAIN: Status: ACTIVE | Noted: 2017-04-04

## 2021-06-15 RX ORDER — ROSUVASTATIN CALCIUM 20 MG/1
20 TABLET, COATED ORAL AT BEDTIME
Qty: 90 TABLET | Refills: 2 | Status: SHIPPED | OUTPATIENT
Start: 2021-06-15 | End: 2022-02-07

## 2021-06-15 NOTE — TELEPHONE ENCOUNTER

## 2021-06-15 NOTE — PATIENT INSTRUCTIONS - HE
Karen Arthur,    It was a pleasure to see you today at the [facility].     My recommendations after this visit include:  - See Dr. Hickman in May.    - Continue to monitor for signs of retaining fluid (increasing weights, shortness of breath, swelling) and call with any concerns.   - If you have any questions or concerns, please call [phone] to talk with my nurse.    Chanel King, CNP

## 2021-06-15 NOTE — PROGRESS NOTES
HISTORY OF PRESENT ILLNESS  Asked to see by Dr. Tate for evaluation of cerumen impaction. The patient and her daughter reports that Dr. Tate noted significant cerumen in both ears and advised that she follow up to have the ears debrided. No pain. No drainage.     REVIEW OF SYSTEMS  Review of Systems: a 10-system review was performed. Pertinent positives are noted in the HPI and on a separate scanned document in the chart.    PMH, PSH, FH and SH has documented in the EHR.      EXAM    CONSTITUTIONAL  General Appearance:  Normal, well developed, well nourished, no obvious distress  Ability to Communicate:  communicates appropriately.    HEAD AND FACE  Appearance and Symmetry:  Normal, no scalp or facial scarring or suspicious lesions.    EYE  Normal external eye, conjunctiva, lids, cornea.     EARS  Clinical speech reception threshold:  Normal, able to hear normal speech.  Auricle:  Normal, Auricles without scars, lesions, masses.  External auditory canal: Bilateral cerumen impaction debrided under otomicroscopy using microdissection technique..  Tympanic membrane:  Normal, Tympanic membranes normal without swelling or erythema.  Tympanic membrane mobility:  Normal, Normal tympanic membrane mobility.    NOSE (speculum or scope)  Architecture:  Normal, Grossly normal external nasal architecture with no masses or lesions.    NEUROLOGICAL  Speech pattern:  Normal, Proasaic    RESPIRATORY  Symmetry and Respiratory effort:  Normal, Symmetric chest movement and expansion with no increased intercostal retractions or use of accessory muscles.     IMPRESSION  Bilateral cerumen impaction.     RECOMMENDATION  Follow up as needed.    Diego Mcclendon MD

## 2021-06-15 NOTE — TELEPHONE ENCOUNTER
Refill Approved    Rx renewed per Medication Renewal Policy. Medication was last renewed on 3/10/20.    Barber Sheth, [facility] Triage/Med Refill 2/26/2021     Requested Prescriptions   Pending Prescriptions Disp Refills     losartan (COZAAR) 50 MG tablet [Pharmacy Med Name: Losartan Potassium Oral Tablet 50 MG] 90 tablet 0     Sig: Take 1 tablet (50 mg total) by mouth daily.       Angiotensin Receptor Blocker Protocol Passed - 2/25/2021  7:05 PM        Passed - PCP or prescribing provider visit in past 12 months       Last office visit with prescriber/PCP: 7/7/2020 Kehinde Tate MD OR same dept: 7/7/2020 Kehinde Tate MD OR same specialty: 7/7/2020 Kehinde Tate MD  Last physical: 2/2/2021 Last MTM visit: Visit date not found   Next visit within 3 mo: Visit date not found  Next physical within 3 mo: Visit date not found  Prescriber OR PCP: Kehinde aTte MD  Last diagnosis associated with med order: 1. Essential hypertension  - losartan (COZAAR) 50 MG tablet [Pharmacy Med Name: Losartan Potassium Oral Tablet 50 MG]; Take 1 tablet (50 mg total) by mouth daily.  Dispense: 90 tablet; Refill: 0    If protocol passes may refill for 12 months if within 3 months of last provider visit (or a total of 15 months).             Passed - Serum potassium within the past 12 months     Lab Results   Component Value Date    Potassium 4.7 02/02/2021             Passed - Blood pressure filed in past 12 months     BP Readings from Last 1 Encounters:   02/08/21 136/62             Passed - Serum creatinine within the past 12 months     Creatinine   Date Value Ref Range Status   02/02/2021 1.20 (H) 0.60 - 1.10 mg/dL Final

## 2021-06-16 PROBLEM — M41.9 SCOLIOSIS OF LUMBAR SPINE, UNSPECIFIED SCOLIOSIS TYPE: Status: ACTIVE | Noted: 2018-12-21

## 2021-06-16 PROBLEM — M79.605 LOW BACK PAIN RADIATING TO BOTH LEGS: Status: ACTIVE | Noted: 2019-07-18

## 2021-06-16 PROBLEM — E87.1 HYPONATREMIA: Status: ACTIVE | Noted: 2017-11-26

## 2021-06-16 PROBLEM — F51.04 CHRONIC INSOMNIA: Status: ACTIVE | Noted: 2020-02-27

## 2021-06-16 PROBLEM — Z85.828 HISTORY OF SKIN CANCER: Status: ACTIVE | Noted: 2019-12-30

## 2021-06-16 PROBLEM — M79.671 RIGHT FOOT PAIN: Status: ACTIVE | Noted: 2020-02-27

## 2021-06-16 PROBLEM — M54.50 LOW BACK PAIN RADIATING TO BOTH LEGS: Status: ACTIVE | Noted: 2019-07-18

## 2021-06-16 PROBLEM — Z86.73 HISTORY OF CVA (CEREBROVASCULAR ACCIDENT): Status: ACTIVE | Noted: 2020-05-04

## 2021-06-16 PROBLEM — M79.604 LOW BACK PAIN RADIATING TO BOTH LEGS: Status: ACTIVE | Noted: 2019-07-18

## 2021-06-16 NOTE — TELEPHONE ENCOUNTER
Refill Approved    Rx renewed per Medication Renewal Policy. Medication was last renewed on 10/13/20.    Barber Sheth, [facility] Triage/Med Refill 4/13/2021     Requested Prescriptions   Pending Prescriptions Disp Refills     memantine (NAMENDA) 10 MG tablet [Pharmacy Med Name: Memantine HCl Oral Tablet 10 MG] 180 tablet 0     Sig: TAKE 1 TABLET BY MOUTH TWICE DAILY.       Cholinesterase Inhibitor/Anti-Alzheimer Agent Refill Protocol Passed - 4/12/2021  2:00 AM        Passed - Visit with PCP or prescribing provider visit in last 6 months or next 3 months     Last office visit with prescriber/PCP: Visit date not found OR same dept: 7/7/2020 Kehinde Tate MD OR same specialty: 7/7/2020 Kehinde Tate MD Last physical: 2/2/2021 Last MTM visit: Visit date not found     Next appt within 3 mo: Visit date not found  Next physical within 3 mo: Visit date not found  Prescriber OR PCP: Kehinde Tate MD  Last diagnosis associated with med order: 1. Dementia without behavioral disturbance, unspecified dementia type (H)  - memantine (NAMENDA) 10 MG tablet [Pharmacy Med Name: Memantine HCl Oral Tablet 10 MG]; TAKE 1 TABLET BY MOUTH TWICE DAILY.  Dispense: 180 tablet; Refill: 0    If protocol passes may refill for 6 months if within 3 months of last provider visit (or a total of 9 months).

## 2021-06-16 NOTE — PROGRESS NOTES
Office Visit - Follow Up   Karen Arthur   82 y.o. female    Date of Visit: 3/13/2018    Chief Complaint   Patient presents with     Nausea     Ongoing for over 1 year. Progressively getting worse     Emesis     7-8times every 2wks     Fatigue     Seems to happen after the nausea and vomiting     Follow-up     Per last visit     Weight Loss     10# in 1 year        Assessment and Plan   1. Nausea and vomiting  Unclear etiology of recurrent sporadic episodes of vomiting.  She has had extensive cardiac and GI evaluation.  I still think this may be a side effect of her Exelon and I have asked her to again discontinue using.  She should also stay off the patch for the next 3 months.  Only if her symptoms continue to occur after stopping the patch for at least 4 weeks would I be comfortable restarting.  She should complete GI workup as recommended by gastroenterology including manometry.  I am asking them to keep a food journal and also keep better track of her bowel movements.  Her  should also check her blood pressure and heart rate on the days the symptoms occur.  As soon as she starts having symptoms, if blood pressure should be obtained and then every hour thereafter.  We discussed [facility] if no diagnosis can be made.  - HM2(CBC w/o Differential)  - Comprehensive Metabolic Panel  - Lipase    2. Moderate protein-calorie malnutrition  As above, 10 pound weight loss over the last year.  I suspect dementia is contributing factor to decreased appetite.  I am hoping that discontinuing Exelon will improve her appetite    3. Dementia  We will continue Namenda even with discontinuance of Exelon.    4. Chronic anemia  Recheck hemoglobin    5. Chronic constipation  Continue MiraLAX    6. Essential hypertension  Excellent blood pressure control    7. Hypothyroidism  Recheck TSH  - Thyroid Stimulating Hormone (TSH)    Return in about 3 months (around 6/13/2018) for Recheck.     History of Present Illness   This 82  y.o. old woman with complicated medical history including dementia, essential hypertension, nonischemic cardiomyopathy, recurrent episodes of nausea and vomiting with malnutrition.  She has lost another 3 pounds since her last visit.  A total of 10 pounds over the last year.  She states that food does not taste good.  She also has recurrent episodes of vomiting.  This will occur sporadically.  She was hospitalized in late November.  She has had extensive cardiac and gastroenterology workup without clear explanation.  She had an EGD which was unremarkable.  Gastroenterology thought that this may be from chronic constipation and put her on a bowel regimen.  She underwent a typical prep for colonoscopy to get cleaned out.  Exelon was also discontinued in early December at our last visit as having thought this might be a culprit.  She actually did quite well without any recurrent symptoms for several weeks.  At some point in January her  restarted her Exelon patch.  She then had a day of recurrent emesis on January 22.  This recurred February 14, 2027, and March 11, just 2 days ago.  She will feel exhausted for the 24 hours after these episodes.  She will also not eat or drink much of anything the rest of the day after the vomiting.  There is no associated abdominal or chest pain when this occurs.  She cannot think of any food items that are different on these days.  She thinks her bowels are moving fine and she takes MiraLAX daily still.  No anxiety is a contributing factor.    Review of Systems:  Otherwise, a comprehensive review of systems was negative except as noted.     Medications, Allergies and Problem List   Patient Active Problem List   Diagnosis     Hypothyroidism     Chronic back pain     Hyperlipidemia     Osteoarthritis of both hands     Vitamin D deficiency     Gastritis     RLS (restless legs syndrome)     Osteoarthritis, hip, bilateral     Chronic constipation     Chronic anemia     Dementia      Near syncope     Essential hypertension     NSVT (nonsustained ventricular tachycardia)     GERD (gastroesophageal reflux disease)     Chronic right shoulder pain     Chronic fatigue     Nonischemic cardiomyopathy     Osteoporosis     Hyponatremia     Moderate protein-calorie malnutrition     Nausea and vomiting       She has a past surgical history that includes Lumbar laminectomy (2005); Cataract extraction, bilateral; Cholecystectomy (2007); Hemorroidectomy; Dilation and curettage of uterus; Tonsillectomy; Colonoscopy (2014); ORIF hip fracture (2015); and Breast lumpectomy.    Allergies   Allergen Reactions     Aricept [Donepezil]      Leg cramps       Lisinopril      Hypotension     Statins-Hmg-Coa Reductase Inhibitors Myalgia     Sulfa (Sulfonamide Antibiotics) Rash       Current Outpatient Prescriptions   Medication Sig Dispense Refill     acetaminophen (TYLENOL) 500 MG tablet Take 1,000 mg by mouth 3 (three) times a day.       aspirin 325 MG EC tablet Take 325 mg by mouth daily.       calcium carbonate-vitamin D2 500 mg(1,250mg) -200 unit tablet Take 1 tablet by mouth 2 (two) times a day.       carvedilol (COREG) 12.5 MG tablet Take 1 tablet (12.5 mg total) by mouth 2 (two) times a day with meals. 180 tablet 2     diphenhydrAMINE (BENADRYL) 25 mg tablet Take 50 mg by mouth bedtime.       ergocalciferol (VITAMIN D2) 50,000 unit capsule Take 1 capsule (50,000 Units total) by mouth once a week. (Patient taking differently: Take 50,000 Units by mouth once a week. Weekly on Mondays) 13 capsule 3     ferrous sulfate 325 (65 FE) MG tablet Take 1 tablet (325 mg total) by mouth 3 (three) times a week. (Patient taking differently: Take 1 tablet by mouth daily with breakfast. )  0     lansoprazole (PREVACID) 30 MG capsule Take 1 capsule (30 mg total) by mouth 2 (two) times a day. 180 capsule 2     levothyroxine (SYNTHROID, LEVOTHROID) 100 MCG tablet Take 1 tablet (100 mcg total) by mouth daily. 90 tablet 2     losartan  "(COZAAR) 50 MG tablet Take 1 tablet (50 mg total) by mouth at bedtime. 90 tablet 3     memantine (NAMENDA) 10 MG tablet Take 1 tablet (10 mg total) by mouth 2 (two) times a day. 180 tablet 3     nabumetone (RELAFEN) 750 MG tablet Take 1 tablet (750 mg total) by mouth 2 (two) times a day. 180 tablet 3     polyethylene glycol (MIRALAX) 17 gram packet Take 17 g by mouth daily as needed.        TURMERIC ROOT EXTRACT ORAL Take 300 mg by mouth daily.        Current Facility-Administered Medications   Medication Dose Route Frequency Provider Last Rate Last Dose     denosumab 60 mg (PROLIA 60 mg/ml)  60 mg Subcutaneous Q6 Months Kehinde Tate MD   60 mg at 10/10/17 1030        Physical Exam   General Appearance:   Well-appearing elderly woman    /74 (Patient Site: Left Arm, Patient Position: Sitting, Cuff Size: Adult Regular)  Pulse (!) 58  Ht 5' 4\" (1.626 m)  Wt 117 lb 0.6 oz (53.1 kg)  LMP  (LMP Unknown)  SpO2 98%  Breastfeeding? No  BMI 20.09 kg/m2    HEENT: Normal  Respiratory: Normal respiratory effort.  Lungs are clear with no rales or wheezes.  Heart: Regular rate and rhythm   Abdomen: Abdomen is soft, nontender without guarding, rebound, masses, or hepatosplenomegaly.  Extremities: No peripheral edema.  Neurologic: Grossly nonfocal  Skin: No cyanosis or pallor  Psych: Alert and oriented ×3, mood appropriate         Additional Information   Social History   Substance Use Topics     Smoking status: Never Smoker     Smokeless tobacco: Never Used     Alcohol use 0.6 oz/week     1 Glasses of wine per week      Comment: glass of wine with dinner              Kehinde Tate MD  "

## 2021-06-16 NOTE — TELEPHONE ENCOUNTER
Telephone Encounter by Laura Trivedi at 1/22/2019  4:49 PM     Author: Laura Trivedi Service: -- Author Type: --    Filed: 1/22/2019  4:49 PM Encounter Date: 1/17/2019 Status: Signed    : Laura Trivedi APPROVED:    Approval start date:12/23/2018  Approval end date:01/22/2020    Pharmacy has been notified of approval and will contact patient when medication is ready for pickup.

## 2021-06-16 NOTE — TELEPHONE ENCOUNTER
Telephone Encounter by Carina Ventura CMA at 3/2/2020  1:20 PM     Author: Carina Ventura CMA Service: -- Author Type: Certified Medical Assistant    Filed: 3/2/2020  1:20 PM Encounter Date: 2/28/2020 Status: Signed    : Carina Ventura CMA (Certified Medical Assistant)       Dottie Acosta 2 hours ago (10:52 AM)      Hello,     I have submitted a prior authorization, I will let you know when I hear back.     Thank you,   Cherrie Acosta

## 2021-06-17 NOTE — TELEPHONE ENCOUNTER
Telephone Encounter by Caprice Bae at 1/13/2021  5:08 PM     Author: Caprice Bae Service: -- Author Type: Patient Access    Filed: 1/13/2021  5:18 PM Encounter Date: 1/12/2021 Status: Signed    : Caprice Bae (Patient Access)       [place] APPROVED:    Approval start date: 12/13/20  Approval end date:  01/12/22    I called to notify them of the approval, but all the Rxs have been canceled and deactivated; they are requesting that a clinic rep call and notify them if the patient is supposed to be on the medication or not. If yes, please provide the pharmacy with an Rx order.

## 2021-06-17 NOTE — PATIENT INSTRUCTIONS - HE
Patient Instructions by Kehinde Tate MD at 12/30/2019 10:00 AM     Author: Kehinde Tate MD Service: -- Author Type: Physician    Filed: 12/30/2019 11:00 AM Encounter Date: 12/30/2019 Status: Addendum    : Kehinde Tate MD (Physician)    Related Notes: Original Note by Kehinde Tate MD (Physician) filed at 12/30/2019 10:55 AM         Patient Education   Understanding USDA MyPlate  The USDA (US Department of Agriculture) has guidelines to help you make healthy food choices. These are called MyPlate. MyPlate shows the food groups that make up healthy meals using the image of a place setting. Before you eat, think about the healthiest choices for what to put onto your plate or into your cup or bowl. To learn more about building a healthy plate, visit www.choosemyplate.gov.       The Food Groups    Fruits: Any fruit or 100% fruit juice counts as part of the Fruit Group. Fruits may be fresh, canned, frozen, or dried, and may be whole, cut-up, or pureed. Make half your plate fruits and vegetables.    Vegetables: Any vegetable or 100% vegetable juice counts as a member of the Vegetable Group. Vegetables may be fresh, frozen, canned, or dried. They can be served raw or cooked and may be whole, cut-up, or mashed. Make half your plate fruits and vegetables.     Grains: All foods made from grains are part of the Grains Group. These include wheat, rice, oats, cornmeal, and barley such as bread, pasta, oatmeal, cereal, tortillas, and grits. Grains should be no more than a quarter of your plate. At least half of your grains should be whole grains.    Protein: This group includes meat, poultry, seafood, beans and peas, eggs, processed soy products (like tofu), nuts (including nut butters), and seeds. Make protein choices no more than a quarter of your plate. Meat and poultry choices should be lean or low fat.    Dairy: All fluid milk products and foods made from milk that contain calcium,  like yogurt and cheese are part of the Dairy Group. (Foods that have little calcium, such as cream, butter, and cream cheese, are not part of the group.) Most dairy choices should be low-fat or fat-free.    Oils: These are fats that are liquid at room temperature. They include canola, corn, olive, soybean, and sunflower oil. Foods that are mainly oil include mayonnaise, certain salad dressings, and soft margarines. You should have only 5 to 7 teaspoons of oils a day. You probably already get this much from the food you eat.  Use "[employer]er to Help Build Your Meals  The [employer] can help you plan and track your meals and activity. You can look up individual foods to see or compare their nutritional value. You can get guidelines for what and how much you should eat. You can compare your food choices. And you can assess personal physical activities and see ways you can improve. Go to www.MDCapsule.gov/C$ cMoneycker/.    6031-9773 The [employer]. [address] [place] [address]. All rights reserved. This information is not intended as a substitute for professional medical care. Always follow your healthcare professional's instructions.           Patient Education   Urinary Incontinence, Female (Adult)  Urinary incontinence means loss of control of the bladder. This problem affects many women, especially as they get older. If you have incontinence, you may be embarrassed to ask for help. But know that this problem can be treated.  Types of Incontinence  There are different types of incontinence. Two of the main types are described here. You can have more than one type.    Stress incontinence. With this type, urine leaks when pressure (stress) is put on the bladder. This may happen when you cough, sneeze, or laugh. Stress incontinence most often occurs because the pelvic floor muscles that support the bladder and urethra are weak. This can happen after pregnancy and vaginal childbirth or a  hysterectomy. It can also be due to excess body weight or hormone changes.    Urge incontinence (also called overactive bladder). With this type, a sudden urge to urinate is felt often. This may happen even though there may not be much urine in the bladder. The need to urinate often during the night is common. Urge incontinence most often occurs because of bladder spasms. This may be due to bladder irritation or infection. Damage to bladder nerves or pelvic muscles, constipation, and certain medicines can also lead to urge incontinence.  Treatment of urinary incontinence depends on the cause. Further evaluation is needed to find the type you have. This will likely include an exam and certain tests. Based on the results, you and your healthcare provider can then plan treatment. Until a diagnosis is made, the home care tips below can help relieve symptoms.  Home care    Do pelvic floor muscle exercises, if they are prescribed. The pelvic floor muscles help support the bladder and urethra. Many women find that their symptoms improve when doing special exercises that strengthen these muscles. To do the exercises contract the muscles you would use to stop your stream of urine, but do this when youre not urinating. Hold for 10 seconds, then relax. Repeat 10 to 20 times in a row, at least 3 times a day. Your provider may give you other instructions for how to do the exercises and how often.    Keep a bladder diary. This helps track how often and how much you urinate over a set period of time. Bring this diary with you to your next visit with the provider. The information can help your provider learn more about your bladder problem.    Lose weight, if advised to by your provider. Excess weight puts pressure on the bladder. Your provider can help you create a weight-loss plan thats right for you. This may include exercising more and making certain diet changes.    Don't consume foods and drinks that may irritate the bladder.  These can include alcohol and caffeinated drinks.    Quit smoking. Smoking and other tobacco use can lead to chronic cough that strains the pelvic floor muscles. Smoking may also damage the bladder and urethra. Talk with your provider about treatments or methods you can use to quit smoking.    If drinking large amounts of fluid causes you to have symptoms, you may be advised to limit your fluid intake. You may also be advised to drink most of your fluids during the day and to limit fluids at night.    If youre worried about urine leakage or accidents, you may wear absorbent pads to catch urine. Change the pads often. This helps reduce discomfort. It may also reduce the risk of skin or bladder infections.  Follow-up care  Follow up with your healthcare provider, or as directed. It may take some to find the right treatment for your problem. Your treatment plan may include special therapies or medicines. Certain procedures or surgery may also be options. Be sure to discuss any questions you have with your provider.  When to seek medical advice  Call the healthcare provider right away if any of these occur:    Fever of 100.4 F (38 C) or higher, or as directed by your provider    Bladder pain or fullness    Abdominal swelling    Nausea or vomiting    Back pain    Weakness, dizziness or fainting  Date Last Reviewed: 10/1/2017    1925-4865 [employer]. [address] [place] [address], [place] [address]. All rights reserved. This information is not intended as a substitute for professional medical care. Always follow your healthcare professional's instructions.     Patient Education     Kegel Exercises  Kegel exercises dont need special clothing or equipment. Theyre easy to learn and simple to do. And if you do them right, no one can tell youre doing them, so they can be done almost anywhere. Your healthcare provider, nurse, or physical therapist can answer any questions you have and help you get started.    A weak  pelvic floor  The pelvic floor muscles may weaken due to aging, pregnancy and vaginal childbirth, injury, surgery, chronic cough, or lack of exercise. If the pelvic floor is weak, your bladder and other pelvic organs may sag out of place. The urethra may also open too easily and allow urine to leak out. Kegel exercises can help you strengthen your pelvic floor muscles. Then they can better support the pelvic organs and control urine flow.  How Kegel exercises are done  Try each of the Kegel exercises described below. When youre doing them, try not to move your leg, buttock, or stomach muscles:    Contract as if you were stopping your urine stream. But do it when youre not urinating.    Tighten your rectum as if trying not to pass gas. Contract your anus, but dont move your buttocks.    You may place a finger or 2 in the vagina and squeeze your finger with your vagina to learn which muscles to tighten.  Try to hold each Kegel for a slow count to 5. You probably wont be able to hold them for that long at first. But keep practicing. It will get easier as your pelvic floor gets stronger. Eventually, special weights that you place in your vagina may be recommended to help make your Kegels even more effective. Visit your healthcare provider if you have difficulties doing Kegel exercises.  Helpful hints  Here are some tips to follow:    Do your Kegels as often as you can. The more you do them, the faster youll feel the results.    Pick an activity you do often as a reminder. For instance, do your Kegels every time you sit down.    Tighten your pelvic floor before you sneeze, get up from a chair, cough, laugh, or lift. This protects your pelvic floor from injury and can help prevent urine leakage.   Date Last Reviewed: 10/1/2017    0081-2660 [employer]. [address] [place] [address], [place] [address]. All rights reserved. This information is not intended as a substitute for professional medical care. Always follow  your healthcare professional's instructions.     Patient Education   Preventing Falls in the Home  As you get older, falls are more likely. Thats because your reaction time slows. Your muscles and joints may also get stiffer, making them less flexible. Illness, medications, and vision changes can also affect your balance. A fall could leave you unable to live on your own. To make your home safer, follow these tips:    Floors    Put nonskid pads under area rugs.    Remove throw rugs.    Replace worn floor coverings.    Tack carpets firmly to each step on carpeted stairs. Put nonskid strips on the edges of uncarpeted stairs.    Keep floors and stairs free of clutter and cords.    Arrange furniture so there are clear pathways.    Clean up any spills right away.    Bathrooms    Install grab bars in the tub or shower.    Apply nonskid strips or put a nonskid rubber mat in the tub or shower.    Sit on a bath chair to bathe.    Use bathmats with nonskid backing.    Lighting    Keep a flashlight in each room.    Put a nightlight along the pathway between the bedroom and the bathroom.    3359-4142 The [employer]. [address] [place] [address]. All rights reserved. This information is not intended as a substitute for professional medical care. Always follow your healthcare professional's instructions.           Advance Directive  Patients advance directive was discussed and I am comfortable with the patients wishes.  Patient Education   Personalized Prevention Plan  You are due for the preventive services outlined below.  Your care team is available to assist you in scheduling these services.  If you have already completed any of these items, please share that information with your care team to update in your medical record.  Health Maintenance   Topic Date Due   ? DXA SCAN  08/31/2019   ? MEDICARE ANNUAL WELLNESS VISIT  12/30/2020   ? FALL RISK ASSESSMENT  12/30/2020   ? TD 18+ HE  06/08/2022   ?  ADVANCE CARE PLANNING  12/30/2024   ? PNEUMOCOCCAL IMMUNIZATION 65+ LOW/MEDIUM RISK  Completed   ? INFLUENZA VACCINE RULE BASED  Completed   ? ZOSTER VACCINES  Completed         Next and final Prolia injection will be due in February 2020.  We will plan to repeat DEXA in the spring or summer 2020  Schedule appointment with dermatology to have lesion behind left ear evaluated

## 2021-06-18 NOTE — PROGRESS NOTES
Office Visit - Follow Up   Karen Arthur   83 y.o. female    Date of Visit: 6/14/2018    Chief Complaint   Patient presents with     Hypertension     Follow-up     fatigue, abdominal pain     low energy        Assessment and Plan   1. Nausea and vomiting  Side effect of Exelon which is discontinued.    2. Dementia  Continue Namenda 10 mg twice daily.  Unable to tolerate Aricept or Exelon.    3. Chronic fatigue  Recheck hemoglobin    4. Chronic constipation  Doing well with MiraLAX    5. Chronic anemia  Monitor hemoglobin, remains on iron replacement  - Hemoglobin    6. Essential hypertension  Excellent blood pressure control with losartan    7. Hypothyroidism  TSH looked good 3 months ago    8. Osteoporosis  She is due for a Prolia injection today.    Return in about 6 months (around 12/14/2018) for Annual physical.     History of Present Illness   This 83 y.o. old woman here to follow-up multiple medical problems.  She has dementia and remains on Namenda but Exelon was discontinued 3 months ago as it was thought to be possibly contributing to her recurrent episodes of nausea and vomiting.  Since stopping the medicine, symptoms have completely resolved.  She has been able to gain 3 pounds.  Appetite is good.  Memory loss continues to be a problem but  provides assistance.  We also discussed her osteoporosis.  She remains on Prolia every 6 months and is due for an injection today.  Constipation is well-controlled taking MiraLAX daily.  Remains on Synthroid for hypothyroidism.  Blood pressure is well controlled with losartan.  She is complaining of some fatigue.  Less energy.  This has been chronic.  No chest pain.  No edema.    Review of Systems:  Otherwise, a comprehensive review of systems was negative except as noted.     Medications, Allergies and Problem List   Patient Active Problem List   Diagnosis     Hypothyroidism     Chronic back pain     Hyperlipidemia     Osteoarthritis of both hands      Vitamin D deficiency     Gastritis     RLS (restless legs syndrome)     Osteoarthritis, hip, bilateral     Chronic constipation     Chronic anemia     Dementia     Near syncope     Essential hypertension     NSVT (nonsustained ventricular tachycardia) (H)     GERD (gastroesophageal reflux disease)     Chronic right shoulder pain     Chronic fatigue     Nonischemic cardiomyopathy (H)     Osteoporosis     Hyponatremia     Moderate protein-calorie malnutrition (H)     Nausea and vomiting       She has a past surgical history that includes Lumbar laminectomy (2005); Cataract extraction, bilateral; Cholecystectomy (2007); Hemorroidectomy; Dilation and curettage of uterus; Tonsillectomy; Colonoscopy (2014); ORIF hip fracture (2015); and Breast lumpectomy.    Allergies   Allergen Reactions     Aricept [Donepezil]      Leg cramps       Exelon [Rivastigmine]      Nausea/vomiting     Lisinopril      Hypotension     Statins-Hmg-Coa Reductase Inhibitors Myalgia     Sulfa (Sulfonamide Antibiotics) Rash       Current Outpatient Prescriptions   Medication Sig Dispense Refill     acetaminophen (TYLENOL) 500 MG tablet Take 1,000 mg by mouth 3 (three) times a day.       aspirin 325 MG EC tablet Take 325 mg by mouth daily.       calcium carbonate-vitamin D2 500 mg(1,250mg) -200 unit tablet Take 1 tablet by mouth 2 (two) times a day.       carvedilol (COREG) 12.5 MG tablet Take 1 tablet (12.5 mg total) by mouth 2 (two) times a day with meals. 180 tablet 2     diphenhydrAMINE (BENADRYL) 25 mg tablet Take 50 mg by mouth bedtime.       ergocalciferol (VITAMIN D2) 50,000 unit capsule Take 1 capsule (50,000 Units total) by mouth once a week. (Patient taking differently: Take 50,000 Units by mouth once a week. Weekly on Mondays) 13 capsule 3     ferrous sulfate 325 (65 FE) MG tablet Take 1 tablet (325 mg total) by mouth 3 (three) times a week. (Patient taking differently: Take 1 tablet by mouth daily with breakfast. )  0     lansoprazole  "(PREVACID) 30 MG capsule Take 1 capsule (30 mg total) by mouth 2 (two) times a day. 180 capsule 2     levothyroxine (SYNTHROID, LEVOTHROID) 100 MCG tablet Take 1 tablet (100 mcg total) by mouth daily. 90 tablet 2     losartan (COZAAR) 50 MG tablet Take 1 tablet (50 mg total) by mouth at bedtime. 90 tablet 2     nabumetone (RELAFEN) 750 MG tablet Take 1 tablet (750 mg total) by mouth 2 (two) times a day. 180 tablet 3     polyethylene glycol (MIRALAX) 17 gram packet Take 17 g by mouth daily as needed.        TURMERIC ROOT EXTRACT ORAL Take 300 mg by mouth daily.        memantine (NAMENDA) 10 MG tablet Take 1 tablet (10 mg total) by mouth 2 (two) times a day. 180 tablet 3     Current Facility-Administered Medications   Medication Dose Route Frequency Provider Last Rate Last Dose     denosumab 60 mg (PROLIA 60 mg/ml)  60 mg Subcutaneous Q6 Months Kehinde Tate MD   60 mg at 10/10/17 1030        Physical Exam   General Appearance:   Well-appearing elderly woman    /70 (Patient Site: Left Arm, Patient Position: Sitting, Cuff Size: Adult Regular)  Pulse 67  Ht 5' 4\" (1.626 m)  Wt 120 lb (54.4 kg)  LMP  (LMP Unknown)  SpO2 100%  BMI 20.6 kg/m2    HEENT: Normal  Respiratory: Normal respiratory effort.  Lungs are clear with no rales or wheezes.  Heart: Regular rate and rhythm without murmurs, rubs, or gallops.  No carotid bruits.  Extremities: No peripheral edema.  Neurologic: Grossly nonfocal  Skin: No cyanosis or pallor           Additional Information   Social History   Substance Use Topics     Smoking status: Never Smoker     Smokeless tobacco: Never Used     Alcohol use 0.6 oz/week     1 Glasses of wine per week      Comment: glass of wine with dinner              Kehinde Tate MD  "

## 2021-06-18 NOTE — PATIENT INSTRUCTIONS - HE
Patient Instructions by Kehinde Tate MD at 2/2/2021 10:20 AM     Author: Kehinde Tate MD Service: -- Author Type: Physician    Filed: 2/2/2021 11:21 AM Encounter Date: 2/2/2021 Status: Addendum    : Kehinde Tate MD (Physician)    Related Notes: Original Note by Kehinde Tate MD (Physician) filed at 2/2/2021 11:18 AM         Patient Education     Your Health Risk Assessment indicates you feel you are not in good physical health.    A healthy lifestyle helps keep the body fit and the mind alert. It helps protect you from disease, helps you fight disease, and helps prevent chronic disease (disease that doesn't go away) from getting worse. This is important as you get older and begin to notice twinges in muscles and joints and a decline in the strength and stamina you once took for granted. A healthy lifestyle includes good healthcare, good nutrition, weight control, recreation, and regular exercise. Avoid harmful substances and do what you can to keep safe. Another part of a healthy lifestyle is stay mentally active and socially involved.    Good healthcare     Have a wellness visit every year.     If you have new symptoms, let us know right away. Don't wait until the next checkup.     Take medicines exactly as prescribed and keep your medicines in a safe place. Tell us if your medicine causes problems.   Healthy diet and weight control     Eat 3 or 4 small, nutritious, low-fat, high-fiber meals a day. Include a variety of fruits, vegetables, and whole-grain foods.     Make sure you get enough calcium in your diet. Calcium, vitamin D, and exercise help prevent osteoporosis (bone thinning).     If you live alone, try eating with others when you can. That way you get a good meal and have company while you eat it.     Try to keep a healthy weight. If you eat more calories than your body uses for energy, it will be stored as fat and you will gain weight.     Recreation   Recreation  is not limited to sports and team events. It includes any activity that provides relaxation, interest, enjoyment, and exercise. Recreation provides an outlet for physical, mental, and social energy. It can give a sense of worth and achievement. It can help you stay healthy.       Patient Education   Understanding USDA MyPlate  The USDA (US Department of Agriculture) has guidelines to help you make healthy food choices. These are called MyPlate. MyPlate shows the food groups that make up healthy meals using the image of a place setting. Before you eat, think about the healthiest choices for what to put onto your plate or into your cup or bowl. To learn more about building a healthy plate, visit www.choosemyplate.gov.       The Food Groups    Fruits: Any fruit or 100% fruit juice counts as part of the Fruit Group. Fruits may be fresh, canned, frozen, or dried, and may be whole, cut-up, or pureed. Make half your plate fruits and vegetables.    Vegetables: Any vegetable or 100% vegetable juice counts as a member of the Vegetable Group. Vegetables may be fresh, frozen, canned, or dried. They can be served raw or cooked and may be whole, cut-up, or mashed. Make half your plate fruits and vegetables.     Grains: All foods made from grains are part of the Grains Group. These include wheat, rice, oats, cornmeal, and barley such as bread, pasta, oatmeal, cereal, tortillas, and grits. Grains should be no more than a quarter of your plate. At least half of your grains should be whole grains.    Protein: This group includes meat, poultry, seafood, beans and peas, eggs, processed soy products (like tofu), nuts (including nut butters), and seeds. Make protein choices no more than a quarter of your plate. Meat and poultry choices should be lean or low fat.    Dairy: All fluid milk products and foods made from milk that contain calcium, like yogurt and cheese are part of the Dairy Group. (Foods that have little calcium, such as  cream, butter, and cream cheese, are not part of the group.) Most dairy choices should be low-fat or fat-free.    Oils: These are fats that are liquid at room temperature. They include canola, corn, olive, soybean, and sunflower oil. Foods that are mainly oil include mayonnaise, certain salad dressings, and soft margarines. You should have only 5 to 7 teaspoons of oils a day. You probably already get this much from the food you eat.  Use [employer] to Help Build Your Meals  The [employer] can help you plan and track your meals and activity. You can look up individual foods to see or compare their nutritional value. You can get guidelines for what and how much you should eat. You can compare your food choices. And you can assess personal physical activities and see ways you can improve. Go to www.[employer].gov/[facility]cker/.    3997-8804 [employer]. [address] [place] [address]. All rights reserved. This information is not intended as a substitute for professional medical care. Always follow your healthcare professional's instructions.           Patient Education   Instrumental Activities of Daily Living  Your Health Risk Assessment indicates that you have difficulties with activities of daily living such as laundry, housekeeping, banking, shopping, using the telephone, food preparation, transportation, or taking your own medications.  Your provider addressed this with you at today's appointment.     Patient Education   Preventing Falls in the Home  As you get older, falls are more likely. Thats because your reaction time slows. Your muscles and joints may also get stiffer, making them less flexible. Illness, medications, and vision changes can also affect your balance. A fall could leave you unable to live on your own. To make your home safer, follow these tips:    Floors    Put nonskid pads under area rugs.    Remove throw rugs.    Replace worn floor coverings.    Tack carpets  firmly to each step on carpeted stairs. Put nonskid strips on the edges of uncarpeted stairs.    Keep floors and stairs free of clutter and cords.    Arrange furniture so there are clear pathways.    Clean up any spills right away.    Bathrooms    Install grab bars in the tub or shower.    Apply nonskid strips or put a nonskid rubber mat in the tub or shower.    Sit on a bath chair to bathe.    Use bathmats with nonskid backing.    Lighting    Keep a flashlight in each room.    Put a nightlight along the pathway between the bedroom and the bathroom.    5221-9093 [employer]. [address] [place] [address], [place] [address]. All rights reserved. This information is not intended as a substitute for professional medical care. Always follow your healthcare professional's instructions.           Advance Directive  Patients advance directive was discussed and I am comfortable with the patients wishes.  Patient Education   Personalized Prevention Plan  You are due for the preventive services outlined below.  Your care team is available to assist you in scheduling these services.  If you have already completed any of these items, please share that information with your care team to update in your medical record.  Health Maintenance   Topic Date Due   ? COVID-19 Vaccine (1 of 2) 03/16/1951   ? MEDICARE ANNUAL WELLNESS VISIT  02/02/2022   ? FALL RISK ASSESSMENT  02/02/2022   ? TD 18+ HE  06/08/2022   ? ADVANCE CARE PLANNING  02/12/2025   ? Pneumococcal Vaccine: 65+ Years  Completed   ? INFLUENZA VACCINE RULE BASED  Completed   ? ZOSTER VACCINES  Completed   ? Pneumococcal Vaccine: Pediatrics (0 to 5 Years) and At-Risk Patients (6 to 64 Years)  Aged Out   ? HEPATITIS B VACCINES  Aged Out         Continue annual flu shots    Continue to see your eye doctor every year    Schedule DEXA for this spring to assess response to Prolia    Consider seeing a dermatologist every year for total body skin exam with your history of  skin cancer

## 2021-06-19 NOTE — LETTER
Letter by Kehinde Tate MD at      Author: Kehinde Tate MD Service: -- Author Type: --    Filed:  Encounter Date: 7/20/2019 Status: (Other)         Karen Arthur  [address]  [place] [address]             July 20, 2019         Dear Karen Roper,    Below are the results from your recent visit:    Resulted Orders   HM2(CBC w/o Differential)   Result Value Ref Range    WBC 7.3 4.0 - 11.0 thou/uL    RBC 3.75 (L) 3.80 - 5.40 mill/uL    Hemoglobin 12.3 12.0 - 16.0 g/dL    Hematocrit 36.4 35.0 - 47.0 %    MCV 97 80 - 100 fL    MCH 32.8 27.0 - 34.0 pg    MCHC 33.9 32.0 - 36.0 g/dL    RDW 11.9 11.0 - 14.5 %    Platelets 258 140 - 440 thou/uL    MPV 6.8 (L) 7.0 - 10.0 fL   Comprehensive Metabolic Panel   Result Value Ref Range    Sodium 136 136 - 145 mmol/L    Potassium 4.7 3.5 - 5.0 mmol/L    Chloride 101 98 - 107 mmol/L    CO2 25 22 - 31 mmol/L    Anion Gap, Calculation 10 5 - 18 mmol/L    Glucose 90 70 - 125 mg/dL    BUN 33 (H) 8 - 28 mg/dL    Creatinine 1.24 (H) 0.60 - 1.10 mg/dL    GFR MDRD Af Amer 50 (L) >60 mL/min/1.73m2    GFR MDRD Non Af Amer 41 (L) >60 mL/min/1.73m2    Bilirubin, Total 0.3 0.0 - 1.0 mg/dL    Calcium 9.8 8.5 - 10.5 mg/dL    Protein, Total 6.4 6.0 - 8.0 g/dL    Albumin 3.5 3.5 - 5.0 g/dL    Alkaline Phosphatase 55 45 - 120 U/L    AST 19 0 - 40 U/L    ALT 16 0 - 45 U/L    Narrative    Fasting Glucose reference range is 70-99 mg/dL per  American Diabetes Association (ADA) guidelines.   Thyroid Stimulating Hormone (TSH)   Result Value Ref Range    TSH 0.89 0.30 - 5.00 uIU/mL       Your labs look fine.  No anemia.  Kidney function is mildly decreased but not significantly different than previous measurements.  Your current thyroid dose looks good.  Normal liver studies.    Please call with questions or contact us using [employer].    Sincerely,        Electronically signed by Kehinde Tate MD

## 2021-06-20 ENCOUNTER — HEALTH MAINTENANCE LETTER (OUTPATIENT)
Age: 86
End: 2021-06-20

## 2021-06-20 NOTE — LETTER
Letter by Va Engle RN at      Author: Va Engle RN Service: -- Author Type: --    Filed:  Encounter Date: 2/12/2020 Status: (Other)       [facility] Advance Care Planning  [facility]  [address] [place] [address] [address] [place] [address]        Karen Arthur  [address] [facility] [address]    Dear Karen  We are writing on behalf of [facility]. Blanca Brown is the department which coordinates documentation of decision-making authority.  Care providers are required to have copies of legal documents when a patient is unable to make their own health care decisions and the person has documented health care wishes and/or appointed a health care agent to make decisions on their behalf.      We have reviewed the Health Care Directive dated 01/23/2019 which you presented for addition to your medical record. Unfortunately, our review indicates the document is not legally valid for the following reason(s): The witnesses named themselves in error.  In order to create a legal document you will need to have your signature re-witnessed or have it notarized. Notaries and witnesses cannot be named as your health care agents per state law. In addition, only one of your 2 witnesses can be employed by our health care system.     We greatly value the opportunity to assist you in documenting your choices and to honor your   wishes. We apologize for any inconvenience. We have several options to assist you in updating   your document so it meets legal requirements.       Health Care Directives and Advance Care Planning resources can be viewed and printed   for free at our web site:[email or web address].       COPIES of completed Health Care Directives can be brought or mailed to any of our   locations, including the address listed below. You can also email a copy to [email or web address] .       For additional assistance or  questions you can email us at [email or web address] or call [phone]      Sincerely,   Blanca Brown Advance Care Planning  [facility], [facility] and [place] [address]   Email us: [email or web address] Call us: [phone]  Visit at: [email or web address]

## 2021-06-20 NOTE — LETTER
Letter by Kehinde Tate MD at      Author: Kehinde Tate MD Service: -- Author Type: --    Filed:  Encounter Date: 12/31/2019 Status: Signed         Karen Arthur  [address]  [place] [address]             December 31, 2019         Dear Mariana,    Below are the results from your recent visit:    Resulted Orders   Vitamin D, Total (25-Hydroxy)   Result Value Ref Range    Vitamin D, Total (25-Hydroxy) 63.9 30.0 - 80.0 ng/mL    Narrative    Deficiency <10.0 ng/mL  Insufficiency 10.0-29.9 ng/mL  Sufficiency 30.0-80.0 ng/mL  Toxicity (possible) >100.0 ng/mL   Comprehensive Metabolic Panel   Result Value Ref Range    Sodium 136 136 - 145 mmol/L    Potassium 4.5 3.5 - 5.0 mmol/L    Chloride 101 98 - 107 mmol/L    CO2 24 22 - 31 mmol/L    Anion Gap, Calculation 11 5 - 18 mmol/L    Glucose 86 70 - 125 mg/dL    BUN 24 8 - 28 mg/dL    Creatinine 0.96 0.60 - 1.10 mg/dL    GFR MDRD Af Amer >60 >60 mL/min/1.73m2    GFR MDRD Non Af Amer 55 (L) >60 mL/min/1.73m2    Bilirubin, Total 0.5 0.0 - 1.0 mg/dL    Calcium 9.2 8.5 - 10.5 mg/dL    Protein, Total 6.5 6.0 - 8.0 g/dL    Albumin 3.5 3.5 - 5.0 g/dL    Alkaline Phosphatase 74 45 - 120 U/L    AST 22 0 - 40 U/L    ALT 16 0 - 45 U/L    Narrative    Fasting Glucose reference range is 70-99 mg/dL per  American Diabetes Association (ADA) guidelines.   HM2(CBC w/o Differential)   Result Value Ref Range    WBC 7.3 4.0 - 11.0 thou/uL    RBC 3.73 (L) 3.80 - 5.40 mill/uL    Hemoglobin 12.5 12.0 - 16.0 g/dL    Hematocrit 36.1 35.0 - 47.0 %    MCV 97 80 - 100 fL    MCH 33.4 27.0 - 34.0 pg    MCHC 34.5 32.0 - 36.0 g/dL    RDW 11.2 11.0 - 14.5 %    Platelets 299 140 - 440 thou/uL    MPV 6.9 (L) 7.0 - 10.0 fL   Lipid Cascade   Result Value Ref Range    Cholesterol 272 (H) <=199 mg/dL    Triglycerides 183 (H) <=149 mg/dL    HDL Cholesterol 56 >=50 mg/dL    LDL Calculated 179 (H) <=129 mg/dL    Patient Fasting > 8hrs? Unknown    Urinalysis-UC if Indicated   Result Value Ref  Range    Color, UA Yellow Colorless, Yellow, Straw, Light Yellow    Clarity, UA Clear Clear    Glucose, UA Negative Negative    Bilirubin, UA Negative Negative    Ketones, UA Negative Negative    Specific Gravity, UA 1.015 1.001 - 1.030    Blood, UA Negative Negative    pH, UA 5.5 4.5 - 8.0    Protein, UA Negative Negative mg/dL    Urobilinogen, UA <2.0 E.U./dL <2.0 E.U./dL, 2.0 E.U./dL    Nitrite, UA Negative Negative    Leukocytes, UA Trace (!) Negative    Bacteria, UA Few (!) None Seen hpf    RBC, UA 0-2 None Seen, 0-2 hpf    WBC, UA 0-5 None Seen, 0-5 hpf    Squam Epithel, UA 0-5 None Seen, 0-5 lpf    Hyaline Casts, UA 0-5 0-5, None Seen lpf    Narrative    Urine Culture ordered based on [facility] criteria   Thyroid Stimulating Hormone (TSH)   Result Value Ref Range    TSH 0.93 0.30 - 5.00 uIU/mL   Culture, Urine   Result Value Ref Range    Culture No Growth        Except for your cholesterol, everything looks good.  Kidney function is okay and liver studies are normal.  No diabetes with fasting glucose of 86.  Your current thyroid dose looks good.  Nothing of concern on the urine collection.  Vitamin D level is excellent.  No anemia.    Treatment of elevated cholesterol would be recommended but you have a history of not tolerating statins and I am reluctant to retry at this time.    Please call with questions or contact us using [employer].    Sincerely,        Electronically signed by Kehinde Tate MD

## 2021-06-22 NOTE — PROGRESS NOTES
"Prolia Injection Phone Screen      Screening questions have been asked 2-3 days prior to administration visit for Prolia. If any questions are answered with \"Yes,\" this phone encounter were will routed to ordering provider for further evaluation.     1.  When was the last injection?  06/14/18    2.  Has insurance for this injection been verified?  No    3.  Did you experience any new onset achiness or rashes that lasted for over a month with your previous Prolia injection?   No    4.  Do you have a fever over 101?F or a new deep cough that is unusual for you today? No    5.  Have you started any new medications in the last 6 months that you were told could affect your immune system? These may have been prescribed by oncologist, transplant, rheumatology, or dermatology.   No    6.  In the last 6 months have you have gastric bypass or parathyroid surgery?   No    7.  Do you plan dental work requiring drilling into the bone such as implants/extractions or oral surgery in the next 2-3 months?   No    Patient informed if symptoms discussed above present prior to their administration appointment, they are to notify clinic immediately.     Carina Mckoy      The following steps were completed to comply with the REMS program for Prolia:  1. Ordering provider has previously reviewed information in the Medication Guide and Patient Counseling Chart, including the serious risks of Prolia  and the symptoms of each risk and have been advised to seek prompt medical attention if they have signs or symptoms of any of the serious risks.  2. Provided each patient a copy of the Medication Guide and Patient Brochure.  See MAR for administration details.   Indication: Prolia  (denosumab) is a prescription medicine used to treat osteoporosis in patients who:   Are at high risk for fracture, meaning patients who have had a fracture related to osteoporosis, or who have multiple risk factors for fracture; Cannot use another osteoporosis " medicine or other osteoporosis medicines did not work well.   The timeline for early/late injections would be 4 weeks early and any time after the 6 month jonny. If a patient receives their injection late, then the subsequent injection would be 6 months from the date that they actually received the injection    Have the screening questions been asked prior to this administration? Yes

## 2021-06-22 NOTE — PROGRESS NOTES
Assessment and Plan:       1. Medicare annual wellness visit, subsequent  Immunizations are reviewed and recommending Shingrix.  Living will discussed.  Non-smoker.  Uses alcohol in moderation.  Regular exercise discussed.  Up to date with colonoscopies and I would not recommend further given her comorbidities.  Breast exam completed and she is not interested in having further mammograms which is reasonable given her current medical problems.  Last DEXA was 2017 and this should be repeated in 2 years after completing 5 years of Prolia. Dementia and depression screening completed.  She sees an ophthalmologist regularly and gets glaucoma screening.  Skin exam performed and recommending regular use of sunblock.   Will screen for diabetes with fasting glucose.     2. Dementia without behavioral disturbance, unspecified dementia type  Alzheimer's type dementia.  Exelon discontinued as it was causing side effects including recurrent nausea and lightheaded sensation.  Continue Namenda 10 mg twice daily.  Her  is providing help with usual activities of daily living  - Vitamin B12 to exclude as contributing factor    3. Essential hypertension  Blood pressure looking well controlled with current medication  - Comprehensive Metabolic Panel  - Urinalysis    4. Hypothyroidism, unspecified type  Recheck TSH and adjust Synthroid if necessary  - Thyroid Stimulating Hormone (TSH)    5. Age-related osteoporosis without current pathological fracture  He continues to receive Prolia every 6 months.  Last DEXA in 2017 showed improvement.  Will complete 5 years of treatment and repeat DEXA at that time.  I believe this is her eighth injection today.  Need to confirm.    Patient was educated on safety of Prolia utilizing Patient Counseling Chart for Healthcare Providers, as outlined by the Prolia REMS progam.     6. Vitamin D deficiency  Recheck vitamin D level  - Vitamin D, Total (25-Hydroxy)    7. NSVT (nonsustained ventricular  tachycardia) (H)  Stable, continue Coreg    8. Nonischemic cardiomyopathy (H)  Most recent echocardiogram showed improvement in left ventricular systolic function now normal    9. Hyperlipidemia, unspecified hyperlipidemia type  Recheck lipid profile.  Not currently on a statin  - Lipid Cascade    10. Gastroesophageal reflux disease without esophagitis  Well-controlled with current dose of lansoprazole    11. Chronic back pain, unspecified back location, unspecified back pain laterality  Known scoliosis and severe degenerative disc disease with chronic low back pain with worsening pain.  With history of osteoporosis, obtain x-ray to rule out acute vertebral fracture.  Otherwise, continue home exercises as she currently is doing  - XR Lumbar Spine 2 or 3 VWS; Future    12. Scoliosis of lumbar spine, unspecified scoliosis type  As above    13. Iron deficiency anemia, unspecified iron deficiency anemia type  Remains on iron replacement  - Hemoglobin  - Iron and Transferrin Iron Binding Capacity    14. Skin lesion of right ear  Concern for basal cell versus squamous cell skin cancer.  Will try to get her an appointment with dermatology ASAP  - Ambulatory referral to Dermatology        The patient's current medical problems were reviewed.    Over 25 minutes was spent addressing these chronic and new medical problems beyond time spent performing annual wellness visit with over 50% of the time spent counseling and coordination of care    I have had an Advance Directives discussion with the patient.  The following health maintenance schedule was reviewed with the patient and provided in printed form in the after visit summary:   Health Maintenance   Topic Date Due     ZOSTER VACCINES (2 of 3) 03/06/2008     DXA SCAN  08/31/2019     FALL RISK ASSESSMENT  12/21/2019     TD 18+ HE  06/08/2022     ADVANCE DIRECTIVES DISCUSSED WITH PATIENT  12/21/2023     PNEUMOCOCCAL POLYSACCHARIDE VACCINE AGE 65 AND OVER  Completed      INFLUENZA VACCINE RULE BASED  Completed     PNEUMOCOCCAL CONJUGATE VACCINE FOR ADULTS (PCV13 OR PREVNAR)  Completed        Subjective:   Chief Complaint: Karen Arthur is an 83 y.o. female here for an Annual Wellness visit.   HPI: In addition to annual wellness visit, multiple chronic medical problems and new concerns were addressed today    She has a history of chronic low back pain from scoliosis degenerative disc disease.  She takes Tylenol 3 times daily and Relafen twice daily.  Experiencing increasing pain in her low back.  She does have osteoporosis.  No recent falls.  No history of vertebral compression fracture.  She is doing home exercises daily.    Dementia is progressive.  Exelon patch was discontinued as it turned out to be causing side effects including recurrent episodes of nausea and lightheadedness.  Remains on Namenda.   is helping with performing activities of daily living.  He does the cooking and provides transportation.    Reflux is well controlled with current dose of lansoprazole.    Using Synthroid to manage hypothyroidism.    New skin lesion on her right ear present for the past several months    Review of Systems:    Please see above.  The rest of the review of systems are negative for all systems.    Patient Care Team:  Kehinde Tate MD as PCP - General (Internal Medicine)     Patient Active Problem List   Diagnosis     Hypothyroidism     Chronic back pain     Hyperlipidemia     Osteoarthritis of both hands     Vitamin D deficiency     Gastritis     RLS (restless legs syndrome)     Osteoarthritis, hip, bilateral     Chronic constipation     Chronic anemia     Dementia     Near syncope     Essential hypertension     NSVT (nonsustained ventricular tachycardia) (H)     GERD (gastroesophageal reflux disease)     Chronic right shoulder pain     Chronic fatigue     Nonischemic cardiomyopathy (H)     Osteoporosis     Hyponatremia     Scoliosis of lumbar spine, unspecified  scoliosis type     Skin lesion of right ear     Past Medical History:   Diagnosis Date     Appetite loss 5/5/2017     Benign microscopic hematuria     neg workup     Chest pain 11/2016    Hospitalization with chest pain, normal pharmacologic nuclear stress test, GERD suspected     Chronic anemia 1/4/2016     Chronic back pain      Chronic constipation      Chronic fatigue 5/5/2017     Chronic right shoulder pain 4/4/2017     Dementia 07/28/2016    SLUMS 20 out of 30 in July 2016 started Namenda earlier this year     Diverticulosis      Eczema of scalp      Gallstones      Gastritis 2015    NSAIDs     GERD (gastroesophageal reflux disease) 11/8/2016    Responsible for recurrent chest pain      Hip fracture (H) 2015    ORIF hip fracture 2015     HTN (hypertension)      Hyperlipidemia      Hypothyroidism      Lumbar radiculopathy     SHERIF 2014 L1-L2     Moderate protein-calorie malnutrition (H) 11/27/2017     Nausea and vomiting 11/27/2017    Recurrent, occurring monthly, hospitalization November 2017, GI evaluation including EGD suggesting esophageal dysmotility but no other abnormalities.  Questioning whether secondary to chronic constipation and now using MiraLAX.  Also holding Exelon patch, mildly elevated lipase of unclear significance March 2018     Near syncope 10/3/2016     Nonischemic cardiomyopathy (H)     Echocardiogram November 2016 with ejection fraction 35% with global moderate reduction in left ventricular systolic function, aortic sclerosis without significant aortic stenosis, however, cardiac MRI with ejection fraction 45% December 2016 which is more consistent with findings on nuclear stress test, follow-up echocardiogram July 2017 with improved ejection fraction of 50%     NSVT (nonsustained ventricular tachycardia) (H) 11/8/2016    Event monitor 2016     Occipital neuritis 2011     Osteoarthritis of both hands      Osteoarthritis, hip, bilateral      Osteoporosis     started Prolia 7/20/15 after hip  fx Tscore -1.9, fourth injection given April 4, 2017, follow-up DEXA August 2017 T score -1.6 involving left hip suggesting improvement, -2.4 distal radius     Pulmonary nodules 01/05/2017    5 mm nodules bilateral apices, indeterminate but follow-up scan July 2017 unchanged.  Appeared to be benign and no further imaging required     RLS (restless legs syndrome)      Scoliosis of lumbar spine, unspecified scoliosis type 12/21/2018     Skin lesion of right ear 12/21/2018     Vitamin D deficiency       Past Surgical History:   Procedure Laterality Date     BREAST LUMPECTOMY       CATARACT EXTRACTION, BILATERAL       CHOLECYSTECTOMY  2007     COLONOSCOPY  2014     DILATION AND CURETTAGE OF UTERUS       HEMORROIDECTOMY       LUMBAR LAMINECTOMY  2005    L3 - L4     ORIF HIP FRACTURE  2015     TONSILLECTOMY        Family History   Problem Relation Age of Onset     No Medical Problems Mother         age 96     Heart attack Father      Sudden death Father 71     No Medical Problems Sister      No Medical Problems Brother      No Medical Problems Brother       Social History     Socioeconomic History     Marital status:      Spouse name: Not on file     Number of children: 6     Years of education: Not on file     Highest education level: Not on file   Social Needs     Financial resource strain: Not on file     Food insecurity - worry: Not on file     Food insecurity - inability: Not on file     Transportation needs - medical: Not on file     Transportation needs - non-medical: Not on file   Occupational History     Occupation: Homemaker   Tobacco Use     Smoking status: Never Smoker     Smokeless tobacco: Never Used   Substance and Sexual Activity     Alcohol use: Yes     Alcohol/week: 0.6 oz     Types: 1 Glasses of wine per week     Comment: glass of wine with dinner     Drug use: No     Sexual activity: No   Other Topics Concern     Not on file   Social History Narrative     Not on file      Current Outpatient  "Medications   Medication Sig Dispense Refill     acetaminophen (TYLENOL) 500 MG tablet Take 1,000 mg by mouth 3 (three) times a day.       aspirin 325 MG EC tablet Take 325 mg by mouth daily.       carvedilol (COREG) 12.5 MG tablet Take 1 tablet (12.5 mg total) by mouth 2 (two) times a day with meals. 180 tablet 0     ferrous sulfate 325 (65 FE) MG tablet Take 1 tablet (325 mg total) by mouth 3 (three) times a week. (Patient taking differently: Take 1 tablet by mouth daily with breakfast. )  0     lansoprazole (PREVACID) 30 MG capsule Take 1 capsule (30 mg total) by mouth 2 (two) times a day. 180 capsule 2     levothyroxine (SYNTHROID, LEVOTHROID) 100 MCG tablet Take 1 tablet (100 mcg total) by mouth daily. 90 tablet 1     losartan (COZAAR) 50 MG tablet Take 1 tablet (50 mg total) by mouth at bedtime. 90 tablet 2     memantine (NAMENDA) 10 MG tablet Take 1 tablet (10 mg total) by mouth 2 (two) times a day. 180 tablet 3     nabumetone (RELAFEN) 750 MG tablet take 1 tablet by mouth twice daily 180 tablet 3     polyethylene glycol (MIRALAX) 17 gram packet Take 17 g by mouth daily as needed.        TURMERIC ROOT EXTRACT ORAL Take 300 mg by mouth daily.        VITAMIN D2 50,000 unit capsule TAKE 1 CAPSULE (50,000 UNITS TOTAL) BY MOUTH ONCE A WEEK 12 capsule 3     diphenhydrAMINE (BENADRYL) 25 mg tablet Take 50 mg by mouth bedtime.       Current Facility-Administered Medications   Medication Dose Route Frequency Provider Last Rate Last Dose     denosumab 60 mg (PROLIA 60 mg/ml)  60 mg Subcutaneous Q6 Months Kehinde Tate MD   60 mg at 06/14/18 1112      Objective:   Vital Signs:   Visit Vitals  /70 (Patient Site: Left Arm, Patient Position: Sitting, Cuff Size: Adult Regular)   Pulse 69   Ht 5' 4\" (1.626 m)   Wt 124 lb (56.2 kg)   LMP  (LMP Unknown)   SpO2 97%   BMI 21.28 kg/m             PHYSICAL EXAM  EYES: Eyelids, conjunctiva, and sclera were normal. Pupils were normal. Cornea, iris, and lens were normal " bilaterally.  HEAD, EARS, NOSE, MOUTH, AND THROAT: Head and face were normal. Nose appearance was normal and there was no discharge. Oropharynx was normal.  NECK: Neck appearance was normal. There were no neck masses and the thyroid was not enlarged and no nodules are felt.  No lymphadenopathy.  RESPIRATORY: Breathing pattern was normal and the chest moved symmetrically.  Percussion/auscultatory percussion was normal.  Lung sounds were normal and there were no rales or wheezes.  CARDIOVASCULAR: Heart rate and rhythm were normal.  S1 and S2 were normal and there were no extra sounds or murmurs. Peripheral pulses in arms and legs were normal.  Jugular venous pressure was normal.  There was no peripheral edema.  No carotid bruits.  BREAST: No palpable masses or tenderness.  No axillary nodes.  GASTROINTESTINAL: The abdomen was normal in contour.  Bowel sounds were present.   Palpation detected no tenderness, mass, or enlarged organs.   MUSCULOSKELETAL: Skeletal configuration was normal and muscle mass was normal for age. Joint appearance was overall normal.  LYMPHATIC: There were no enlarged nodes.  SKIN/HAIR/NAILS: Skin color was normal.  Hair and nails were normal.  Abnormal lesion on right ear, probable skin cancer  NEUROLOGIC: The patient was alert and oriented to person, place, time, and circumstance. Speech was normal. Cranial nerves were normal. Motor strength was normal for age. The patient was normally coordinated.  Sensation intact.  PSYCHIATRIC:   Impaired short-term memory.  Otherwise mood is normal.    Assessment Results 12/21/2018   Activities of Daily Living No help needed   Instrumental Activities of Daily Living No help needed   Get Up and Go Score Less than 12 seconds   Mini Cog Total Score 2   Some recent data might be hidden     A Mini-Cog score of 0-2 suggests the possibility of dementia, score of 3-5 suggests no dementia    Identified Health Risks:     She is at risk for falling and has been  provided with information to reduce the risk of falling at home.  Information regarding advance directives (living gray), including where she can download the appropriate form, was provided to the patient via the AVS.       The patient was provided with appropriate referrals to address her memory problem.

## 2021-06-23 NOTE — TELEPHONE ENCOUNTER
Fax received from [facility], they have started the Prior Authorization Process via Cover My Meds    CoverMyMeds Key: [id number]    Medication Name: Lansoprazole 30mg    Insurance Plan: [employer] Cross Federal  PBM:   Patient ID: [id number]    Please complete the PA process

## 2021-06-24 NOTE — TELEPHONE ENCOUNTER
Refill Approved    Rx renewed per Medication Renewal Policy. Medication was last renewed on 9/17/18.    Jazmine Meade, [facility] Triage/Med Refill 3/13/2019     Requested Prescriptions   Pending Prescriptions Disp Refills     levothyroxine (SYNTHROID, LEVOTHROID) 100 MCG tablet [Pharmacy Med Name: Levothyroxine Sodium Oral Tablet 100 MCG] 90 tablet 0     Sig: Take 1 tablet (100 mcg total) by mouth daily.    Thyroid Hormones Protocol Passed - 3/7/2019  8:14 PM       Passed - Provider visit in past 12 months or next 3 months    Last office visit with prescriber/PCP: 6/14/2018 Kehinde Tate MD OR same dept: 6/14/2018 Kehinde Tate MD OR same specialty: 6/14/2018 Kehinde Tate MD  Last physical: 12/21/2018 Last MTM visit: Visit date not found   Next visit within 3 mo: Visit date not found  Next physical within 3 mo: Visit date not found  Prescriber OR PCP: Kehinde Tate MD  Last diagnosis associated with med order: 1. Hypothyroidism, unspecified type  - levothyroxine (SYNTHROID, LEVOTHROID) 100 MCG tablet [Pharmacy Med Name: Levothyroxine Sodium Oral Tablet 100 MCG]; Take 1 tablet (100 mcg total) by mouth daily.  Dispense: 90 tablet; Refill: 0    If protocol passes may refill for 12 months if within 3 months of last provider visit (or a total of 15 months).            Passed - TSH on file in past 12 months for patient age 12 & older    TSH   Date Value Ref Range Status   12/21/2018 1.17 0.30 - 5.00 uIU/mL Final

## 2021-06-25 NOTE — TELEPHONE ENCOUNTER
RN cannot approve Refill Request    RN can NOT refill this medication med is not covered by policy/route to provider. Last office visit: 7/7/2020 Kehinde Tate MD Last Physical: 2/2/2021 Last MTM visit: Visit date not found Last visit same specialty: 7/7/2020 Kehinde Tate MD.  Next visit within 3 mo: Visit date not found  Next physical within 3 mo: Visit date not found      Johana Pinto, [facility] Triage/Med Refill 5/26/2021    Requested Prescriptions   Pending Prescriptions Disp Refills     ergocalciferol (ERGOCALCIFEROL) 1,250 mcg (50,000 unit) capsule [Pharmacy Med Name: Vitamin D (Ergocalciferol) Oral Capsule 1.25 MG (89820 UT)] 12 capsule 0     Sig: TAKE 1 CAPSULE (50,000 UNITS TOTAL) BY MOUTH ONCE A WEEK.       There is no refill protocol information for this order

## 2021-06-25 NOTE — PROGRESS NOTES
Progress Notes by Chanel King CNP at 7/25/2017  9:50 AM     Author: Chanel King CNP Service: -- Author Type: Nurse Practitioner    Filed: 7/25/2017 10:02 AM Encounter Date: 7/25/2017 Status: Signed    : Chanel King CNP (Nurse Practitioner)           Click to link to [facility] HEART CARE NOTE      Assessment/Recommendations   Assessment:    1. Nonischemic cardiomyopathy with systolic dysfunction: Karen has no symptoms of acute heart failure.  We reviewed low-sodium diet and daily weight monitoring.    Plan:  1.   Heart failure medications:  - Beta blocker therapy with carvedilol 12.5 mg twice daily  - ARB therapy with losartan 50 mg daily  2.  Echocardiogram on July 31  3.  No further changes to her medications at this time.  If echocardiogram shows decreased ejection fraction, will recommend continued medication titration.    Karen Arthur will follow up with Dr. Sutton on August 8 and in the heart failure clinic in 6 months.     History of Present Illness    Ms. Karen Arthur is a 82 y.o. female seen at [facility] heart failure clinic today for continued follow-up.  She has a history of nonischemic cardiomyopathy with systolic dysfunction.  Cardiac MRI in December 2016 showed an ejection fraction of 45%.  She also has a history of hypertension and dyslipidemia.    Today, she states that she is feeling better than she has in a long time.  She walks about 1 mile most mornings and denies any symptoms.  She has no symptoms of acute heart failure.  She denies fatigue, lightheadedness, shortness of breath, dyspnea on exertion, orthopnea, chest pain, abdominal fullness/bloating and lower extremity edema.      She is occasionally monitoring her weight at home which has slowly been decreasing.  She reports that her primary care provider would like her to gain some weight.     Physical Examination Review of Systems   Vitals:    07/25/17 0937    BP: 124/80   Pulse: 60   Resp: 16     Body mass index is 21.58 kg/(m^2).  Wt Readings from Last 3 Encounters:   07/25/17 121 lb 12.8 oz (55.2 kg)   05/05/17 125 lb (56.7 kg)   04/04/17 127 lb (57.6 kg)       General Appearance:     Alert, cooperative and in no acute distress.   ENT/Mouth: membranes moist, no oral lesions or bleeding gums.      EYES:  no scleral icterus, normal conjunctivae   Chest/Lungs:   lungs are clear to auscultation, no rales or wheezing, respirations unlabored   Cardiovascular:   Regular. Normal first and second heart sounds with no murmurs, rubs, or gallops; the radial and posterior tibial pulses are intact, Jugular venous pressure normal, no edema bilateral lower extremities    Abdomen:  Soft, nontender, nondistended, bowel sounds present   Extremities: no cyanosis or clubbing   Skin: warm, dry.    Neurologic: mood and affect are appropriate, alert and oriented x3      General: WNL  Eyes: WNL  Ears/Nose/Throat: WNL  Lungs: WNL  Heart: WNL  Stomach: WNL  Bladder: WNL  Muscle/Joints: WNL  Skin: WNL  Nervous System: WNL  Mental Health: WNL     Blood: WNL     Medical History  Surgical History Family History Social History   Past Medical History:   Diagnosis Date   ? Appetite loss 5/5/2017   ? Benign microscopic hematuria     neg workup   ? Chest pain 11/2016    Hospitalization with chest pain, normal pharmacologic nuclear stress test, GERD suspected   ? Chronic anemia 1/4/2016   ? Chronic back pain    ? Chronic constipation    ? Chronic fatigue 5/5/2017   ? Chronic right shoulder pain 4/4/2017   ? Dementia 07/28/2016    SLUMS 20 out of 30 in July 2016 started Namenda earlier this year   ? Diverticulosis    ? Eczema of scalp    ? Gallstones    ? Gastritis 2015    NSAIDs   ? GERD (gastroesophageal reflux disease) 11/8/2016    Responsible for recurrent chest pain    ? Hip fracture 2015    ORIF hip fracture 2015   ? HTN (hypertension)    ? Hyperlipidemia    ? Hypothyroidism    ? Lumbar  radiculopathy     SHERIF 2014 L1-L2   ? Near syncope 10/3/2016   ? Nonischemic cardiomyopathy     Echocardiogram November 2016 with ejection fraction 35% with global moderate reduction in left ventricular systolic function, aortic sclerosis without significant aortic stenosis, however, cardiac MRI with ejection fraction 45% December 2016 which is more consistent with findings on nuclear stress test   ? NSVT (nonsustained ventricular tachycardia) 11/8/2016    Event monitor 2016   ? Occipital neuritis 2011   ? Osteoarthritis of both hands    ? Osteoarthritis, hip, bilateral    ? Osteoporosis     started Prolia 7/20/15 after hip fx Tscore -1.9   ? Pulmonary nodules 01/05/2017    5 mm nodules bilateral apices, indeterminate but follow-up CT scan in 6 months to confirm stability   ? RLS (restless legs syndrome)    ? Vitamin D deficiency     Past Surgical History:   Procedure Laterality Date   ? BREAST LUMPECTOMY     ? CATARACT EXTRACTION, BILATERAL     ? CHOLECYSTECTOMY  2007   ? COLONOSCOPY  2014   ? DILATION AND CURETTAGE OF UTERUS     ? HEMORROIDECTOMY     ? LUMBAR LAMINECTOMY  2005    L3 - L4   ? ORIF HIP FRACTURE  2015   ? TONSILLECTOMY      Family History   Problem Relation Age of Onset   ? No Medical Problems Mother      age 96   ? Heart attack Father    ? Sudden death Father 71   ? No Medical Problems Sister    ? No Medical Problems Brother    ? No Medical Problems Brother     Social History     Social History   ? Marital status:      Spouse name: N/A   ? Number of children: 6   ? 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 Smoking status: Never Smoker   ? Smokeless tobacco: Never Used   ? Alcohol use 0.6 oz/week     1 Glasses of wine per week      Comment: glass of wine with dinner   ? Drug use: No   ? Sexual activity: No     Other Topics Concern   ? Not on file     Social History Narrative          Medications  Allergies   Current Outpatient  Prescriptions   Medication Sig Dispense Refill   ? acetaminophen (TYLENOL) 500 MG tablet Take 1,000 mg by mouth 3 (three) times a day.     ? aspirin 325 MG EC tablet Take 325 mg by mouth daily.     ? calcium carbonate-vitamin D2 500 mg(1,250mg) -200 unit tablet Take 1 tablet by mouth 2 (two) times a day.     ? carvedilol (COREG) 12.5 MG tablet Take 1 tablet (12.5 mg total) by mouth 2 (two) times a day with meals. 180 tablet 3   ? diphenhydrAMINE (BENADRYL) 25 mg tablet Take 50 mg by mouth bedtime.     ? ergocalciferol (VITAMIN D2) 50,000 unit capsule Take 1 capsule (50,000 Units total) by mouth once a week. 13 capsule 3   ? ferrous sulfate 325 (65 FE) MG tablet Take 1 tablet (325 mg total) by mouth 3 (three) times a week.  0   ? lansoprazole (PREVACID) 30 MG capsule Take 1 capsule (30 mg total) by mouth 2 (two) times a day. 180 capsule 3   ? levothyroxine (SYNTHROID, LEVOTHROID) 100 MCG tablet Take 1 tablet (100 mcg total) by mouth daily. 90 tablet 3   ? losartan (COZAAR) 50 MG tablet Take 1 tablet (50 mg total) by mouth at bedtime. 90 tablet 3   ? memantine (NAMENDA) 10 MG tablet Take 1 tablet (10 mg total) by mouth 2 (two) times a day. 180 tablet 3   ? nabumetone (RELAFEN) 750 MG tablet Take 1 tablet (750 mg total) by mouth 2 (two) times a day. 180 tablet 3   ? polyethylene glycol (MIRALAX) 17 gram packet Take 17 g by mouth daily as needed.      ? rivastigmine (EXELON) 9.5 mg/24 hr Place 1 patch on the skin daily. 30 patch 12   ? TURMERIC ROOT EXTRACT ORAL Take 350 mg by mouth 2 (two) times a day.       Current Facility-Administered Medications   Medication Dose Route Frequency Provider Last Rate Last Dose   ? denosumab 60 mg (PROLIA 60 mg/ml)  60 mg Subcutaneous Q6 Months Kehinde Tate MD   60 mg at 04/04/17 6283      Allergies   Allergen Reactions   ? Aricept [Donepezil]      Leg cramps     ? Lisinopril      Hypotension   ? Statins-Hmg-Coa Reductase Inhibitors Myalgia   ? Sulfa (Sulfonamide Antibiotics)  Rash         Lab Results    Chemistry CBC BNP   Lab Results   Component Value Date    CREATININE 0.77 05/05/2017    BUN 19 05/05/2017     (L) 05/05/2017    K 4.6 05/05/2017    CL 99 05/05/2017    CO2 25 05/05/2017     Creatinine (mg/dL)   Date Value   05/05/2017 0.77   10/31/2016 0.77   07/28/2016 1.04   09/15/2015 0.84    Lab Results   Component Value Date    WBC 8.1 05/05/2017    HGB 12.0 05/05/2017    HCT 35.3 05/05/2017    MCV 98 05/05/2017     05/05/2017    Lab Results   Component Value Date     10/31/2016     BNP (pg/mL)   Date Value   10/31/2016 110              Chanel King [facility]   Heart Failure Clinic

## 2021-06-25 NOTE — TELEPHONE ENCOUNTER
Refill Approved    Rx renewed per Medication Renewal Policy. Medication was last renewed on 5/4/20.    Barber Sheth, [facility] Triage/Med Refill 6/15/2021     Requested Prescriptions   Pending Prescriptions Disp Refills     rosuvastatin (CRESTOR) 20 MG tablet [Pharmacy Med Name: Rosuvastatin Calcium Oral Tablet 20 MG] 90 tablet 0     Sig: Take 1 tablet (20 mg total) by mouth at bedtime.       Statins Refill Protocol (Hmg CoA Reductase Inhibitors) Passed - 6/14/2021  8:59 PM        Passed - PCP or prescribing provider visit in past 12 months      Last office visit with prescriber/PCP: 7/7/2020 Kehinde Tate MD OR same dept: Visit date not found OR same specialty: 7/7/2020 Kehinde Tate MD  Last physical: 2/2/2021 Last MTM visit: Visit date not found   Next visit within 3 mo: Visit date not found  Next physical within 3 mo: Visit date not found  Prescriber OR PCP: Kehinde Tate MD  Last diagnosis associated with med order: 1. Acute CVA (cerebrovascular accident) (H)  - rosuvastatin (CRESTOR) 20 MG tablet [Pharmacy Med Name: Rosuvastatin Calcium Oral Tablet 20 MG]; Take 1 tablet (20 mg total) by mouth at bedtime.  Dispense: 90 tablet; Refill: 0    If protocol passes may refill for 12 months if within 3 months of last provider visit (or a total of 15 months).

## 2021-06-25 NOTE — TELEPHONE ENCOUNTER
Refill Approved    Rx renewed per Medication Renewal Policy. Medication was last renewed on 5/18/18.    Carolin YING Frannie, [facility] Triage/Med Refill 3/15/2019     Requested Prescriptions   Pending Prescriptions Disp Refills     losartan (COZAAR) 50 MG tablet 90 tablet 2     Sig: Take 1 tablet (50 mg total) by mouth at bedtime.    Angiotensin Receptor Blocker Protocol Passed - 3/15/2019  3:21 PM       Passed - PCP or prescribing provider visit in past 12 months      Last office visit with prescriber/PCP: 6/14/2018 Kehinde Tate MD OR same dept: 6/14/2018 Kehinde Tate MD OR same specialty: 6/14/2018 Kehinde Tate MD  Last physical: 12/21/2018 Last MTM visit: Visit date not found   Next visit within 3 mo: Visit date not found  Next physical within 3 mo: Visit date not found  Prescriber OR PCP: Kehinde Tate MD  Last diagnosis associated with med order: 1. Essential hypertension  - losartan (COZAAR) 50 MG tablet; Take 1 tablet (50 mg total) by mouth at bedtime.  Dispense: 90 tablet; Refill: 2    If protocol passes may refill for 12 months if within 3 months of last provider visit (or a total of 15 months).            Passed - Serum potassium within the past 12 months    Lab Results   Component Value Date    Potassium 4.8 12/21/2018            Passed - Blood pressure filed in past 12 months    BP Readings from Last 1 Encounters:   12/21/18 122/70            Passed - Serum creatinine within the past 12 months    Creatinine   Date Value Ref Range Status   12/21/2018 0.96 0.60 - 1.10 mg/dL Final

## 2021-06-26 NOTE — PROGRESS NOTES
Progress Notes by Chanel King CNP at 9/14/2018  8:30 AM     Author: Chanel King CNP Service: -- Author Type: Nurse Practitioner    Filed: 9/14/2018  9:16 AM Encounter Date: 9/14/2018 Status: Signed    : Chanel King CNP (Nurse Practitioner)           Click to link to [facility] HEART CARE NOTE      Assessment/Recommendations   Assessment:    1. Nonischemic cardiomyopathy with normalization of ejection fraction of 65%: She denies any shortness of breath, chest pain, or edema.    2.  Hypertension: Initial blood pressure is 150/80.  Recheck of her blood pressure is 128/60.  She occasionally checks her blood pressure at home and is usually around 120s-130s systolically.    Plan:  1.  No medication changes    Karen Arthur will follow up in the heart clinic as needed.     History of Present Illness    Ms. Karen Arthur is a 83 y.o. female seen at [facility] heart failure clinic today for continued follow-up.  She has a history of nonischemic cardiomyopathy with normalization of ejection fraction, hypertension, NSVT, and dementia.  Echocardiogram in November 2017 showed ejection fraction of 65%.    Karen denies any acute symptoms today.  She has occasional fatigue during the day.  She denies any problems sleeping.  She denies lightheadedness, shortness of breath, dyspnea on exertion, chest pain and lower extremity edema.       Physical Examination Review of Systems   Vitals:    09/14/18 0848   BP: 128/60   Pulse:    Resp:      Body mass index is 20.43 kg/(m^2).  Wt Readings from Last 3 Encounters:   09/14/18 119 lb (54 kg)   06/14/18 120 lb (54.4 kg)   03/13/18 117 lb 0.6 oz (53.1 kg)       General Appearance:     Alert, cooperative and in no acute distress.   ENT/Mouth: membranes moist, no oral lesions or bleeding gums.      EYES:  no scleral icterus, normal conjunctivae   Chest/Lungs:   lungs are clear to auscultation, no rales or wheezing,  respirations unlabored   Cardiovascular:   Regular. Normal first and second heart sounds; no edema bilateral lower extremities    Abdomen:  Soft, nontender, nondistended, bowel sounds present   Extremities: no cyanosis or clubbing   Skin: warm, dry.    Neurologic: mood and affect are appropriate      General: WNL  Eyes: WNL  Ears/Nose/Throat: WNL  Lungs: WNL  Heart: WNL  Stomach: Constipation  Bladder: WNL  Muscle/Joints: WNL  Skin: WNL  Nervous System: WNL  Mental Health: WNL     Blood: WNL     Medical History  Surgical History Family History Social History   Past Medical History:   Diagnosis Date   ? Appetite loss 5/5/2017   ? Benign microscopic hematuria     neg workup   ? Chest pain 11/2016    Hospitalization with chest pain, normal pharmacologic nuclear stress test, GERD suspected   ? Chronic anemia 1/4/2016   ? Chronic back pain    ? Chronic constipation    ? Chronic fatigue 5/5/2017   ? Chronic right shoulder pain 4/4/2017   ? Dementia 07/28/2016    SLUMS 20 out of 30 in July 2016 started Namenda earlier this year   ? Diverticulosis    ? Eczema of scalp    ? Gallstones    ? Gastritis 2015    NSAIDs   ? GERD (gastroesophageal reflux disease) 11/8/2016    Responsible for recurrent chest pain    ? Hip fracture (H) 2015    ORIF hip fracture 2015   ? HTN (hypertension)    ? Hyperlipidemia    ? Hypothyroidism    ? Lumbar radiculopathy     SHERIF 2014 L1-L2   ? Moderate protein-calorie malnutrition (H) 11/27/2017   ? Nausea and vomiting 11/27/2017    Recurrent, occurring monthly, hospitalization November 2017, GI evaluation including EGD suggesting esophageal dysmotility but no other abnormalities.  Questioning whether secondary to chronic constipation and now using MiraLAX.  Also holding Exelon patch, mildly elevated lipase of unclear significance March 2018   ? Near syncope 10/3/2016   ? Nonischemic cardiomyopathy (H)     Echocardiogram November 2016 with ejection fraction 35% with global moderate reduction in left  ventricular systolic function, aortic sclerosis without significant aortic stenosis, however, cardiac MRI with ejection fraction 45% December 2016 which is more consistent with findings on nuclear stress test, follow-up echocardiogram July 2017 with improved ejection fraction of 50%   ? NSVT (nonsustained ventricular tachycardia) (H) 11/8/2016    Event monitor 2016   ? Occipital neuritis 2011   ? Osteoarthritis of both hands    ? Osteoarthritis, hip, bilateral    ? Osteoporosis     started Prolia 7/20/15 after hip fx Tscore -1.9, fourth injection given April 4, 2017, follow-up DEXA August 2017 T score -1.6 involving left hip suggesting improvement, -2.4 distal radius   ? Pulmonary nodules 01/05/2017    5 mm nodules bilateral apices, indeterminate but follow-up scan July 2017 unchanged.  Appeared to be benign and no further imaging required   ? RLS (restless legs syndrome)    ? Vitamin D deficiency     Past Surgical History:   Procedure Laterality Date   ? BREAST LUMPECTOMY     ? CATARACT EXTRACTION, BILATERAL     ? CHOLECYSTECTOMY  2007   ? COLONOSCOPY  2014   ? DILATION AND CURETTAGE OF UTERUS     ? HEMORROIDECTOMY     ? LUMBAR LAMINECTOMY  2005    L3 - L4   ? ORIF HIP FRACTURE  2015   ? TONSILLECTOMY      Family History   Problem Relation Age of Onset   ? No Medical Problems Mother      age 96   ? Heart attack Father    ? Sudden death Father 71   ? No Medical Problems Sister    ? No Medical Problems Brother    ? No Medical Problems Brother     Social History     Social History   ? Marital status:      Spouse name: N/A   ? Number of children: 6   ? 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 Smoking status: Never Smoker   ? Smokeless tobacco: Never Used   ? Alcohol use 0.6 oz/week     1 Glasses of wine per week      Comment: glass of wine with dinner   ? Drug use: No   ? Sexual activity: No     Other Topics Concern   ? Not on file     Social History  Narrative          Medications  Allergies   Current Outpatient Prescriptions   Medication Sig Dispense Refill   ? acetaminophen (TYLENOL) 500 MG tablet Take 1,000 mg by mouth 3 (three) times a day.     ? aspirin 325 MG EC tablet Take 325 mg by mouth daily.     ? carvedilol (COREG) 12.5 MG tablet Take 1 tablet (12.5 mg total) by mouth 2 (two) times a day with meals. 180 tablet 0   ? diphenhydrAMINE (BENADRYL) 25 mg tablet Take 50 mg by mouth bedtime.     ? ergocalciferol (VITAMIN D2) 50,000 unit capsule Take 1 capsule (50,000 Units total) by mouth once a week. (Patient taking differently: Take 50,000 Units by mouth once a week. Weekly on Mondays) 13 capsule 3   ? ferrous sulfate 325 (65 FE) MG tablet Take 1 tablet (325 mg total) by mouth 3 (three) times a week. (Patient taking differently: Take 1 tablet by mouth daily with breakfast. )  0   ? lansoprazole (PREVACID) 30 MG capsule Take 1 capsule (30 mg total) by mouth 2 (two) times a day. 180 capsule 2   ? losartan (COZAAR) 50 MG tablet Take 1 tablet (50 mg total) by mouth at bedtime. 90 tablet 2   ? memantine (NAMENDA) 10 MG tablet Take 1 tablet (10 mg total) by mouth 2 (two) times a day. 180 tablet 3   ? nabumetone (RELAFEN) 750 MG tablet Take 1 tablet (750 mg total) by mouth 2 (two) times a day. 180 tablet 3   ? polyethylene glycol (MIRALAX) 17 gram packet Take 17 g by mouth daily as needed.      ? TURMERIC ROOT EXTRACT ORAL Take 300 mg by mouth daily.      ? levothyroxine (SYNTHROID, LEVOTHROID) 100 MCG tablet Take 1 tablet (100 mcg total) by mouth daily. 90 tablet 2     Current Facility-Administered Medications   Medication Dose Route Frequency Provider Last Rate Last Dose   ? denosumab 60 mg (PROLIA 60 mg/ml)  60 mg Subcutaneous Q6 Months Kehinde Tate MD   60 mg at 06/14/18 1112      Allergies   Allergen Reactions   ? Aricept [Donepezil]      Leg cramps     ? Exelon [Rivastigmine]      Nausea/vomiting   ? Lisinopril      Hypotension   ? Statins-Hmg-Coa  Reductase Inhibitors Myalgia   ? Sulfa (Sulfonamide Antibiotics) Rash         Lab Results    Chemistry CBC BNP   Lab Results   Component Value Date    CREATININE 1.19 (H) 03/13/2018    BUN 27 03/13/2018     03/13/2018    K 4.9 03/13/2018     03/13/2018    CO2 25 03/13/2018     Creatinine (mg/dL)   Date Value   03/13/2018 1.19 (H)   11/26/2017 0.72   11/25/2017 0.77   08/01/2017 0.92    Lab Results   Component Value Date    WBC 6.5 03/13/2018    HGB 12.3 06/14/2018    HCT 37.2 03/13/2018    MCV 97 03/13/2018     03/13/2018    Lab Results   Component Value Date     10/31/2016     BNP (pg/mL)   Date Value   10/31/2016 110              Chanel King, CNP  [facility]   Heart Failure Clinic

## 2021-06-28 NOTE — PROGRESS NOTES
Progress Notes by Chanel King CNP at 3/12/2020 12:50 PM     Author: Chanel King CNP Service: -- Author Type: Nurse Practitioner    Filed: 3/12/2020  2:17 PM Encounter Date: 3/12/2020 Status: Signed    : Chanel King CNP (Nurse Practitioner)             Assessment/Recommendations   Assessment:    1.  Cardiomyopathy: Ejection fraction has improved from 30% to 40% on most recent echocardiogram in February 2020.  Dr. Hickman has recommended medication titration for decreased ejection fraction.  However, medication titration is limited.  She was on a higher dose of losartan in January but had to decrease due to side effects.  Her  believes that she had stomach pains and nausea.  She is on carvedilol 12.5 mg twice daily.  I am hesitant to increase this dose due to fatigue and Holter monitor showing heart rates as low as 43 bpm. We reviewed heart failure diagnosis, medications, treatment plan, low sodium diet, weight monitoring, and symptom monitoring.      2.  Hypertension: Home blood pressures are controlled around 120 systolically.    Plan:  1.   Heart failure medications:  - Beta blocker therapy with carvedilol 12.5 mg twice daily  - ARB therapy with losartan 50 mg daily  - Aldosterone blocker therapy with spironolactone 25 mg daily.    2.  We reviewed heart failure symptoms to monitor for and when to call  3.  Low-sodium diet and daily weights    Karen Arthur would prefer to follow-up with her primary care provider and with cardiology clinic less often.  Follow-up in the heart failure clinic as needed.  She will see Dr. Hickman in May.       History of Present Illness/Subjective    Ms. Karen Arthur is a 84 y.o. female seen at [facility] today for continued follow-up.  She has a history of hypertension, dyslipidemia, coronary artery disease, and dementia.  Echocardiogram in January 2020 showed ejection fraction of 30%.  She was seen  by Dr. Hickman.  Coronary angiogram on February 13, 2020 which showed moderate disease in LAD and RCA.  She was hospitalized February 14 to February 15 with acute CVA following coronary angiogram.  Weakness in her arm has resolved.  Repeat echocardiogram on February 15, 2020 showed improvement of ejection fraction to 40%.  Holter monitor on March 2, 2020 showed left bundle branch block, first-degree AV block, and moderate number of PVCs.  This was reviewed with Dr. Heath and felt that CRT-D would not be beneficial.  Plan to treat medically.    Karen denies any symptoms today.  Recent test results were reviewed with Karen, her  and daughter.  She has had chronic fatigue for the past few months.  She denies lightheadedness, shortness of breath, dyspnea on exertion, orthopnea, chest pain and lower extremity edema.      Her weight has been stable between 118 to 120 pounds.  Home blood pressures are typically in the 120s systolically.    ECHO:   Results for orders placed during the hospital encounter of 02/14/20   Echo Complete [ECH10] 02/15/2020    Narrative   Normal left ventricular size.The estimated left ventricular ejection   fraction is 40%. This represents a moderately decreased ejection fraction.   Mild hypertrophy noted. Abnormal septal motion consistent with left bundle   branch block. Left ventricular diastolic dysfunction. Normal left atrial   pressure.    Normal right ventricular size and systolic function.    No hemodynamically significant valvular heart abnormalities.           Physical Examination Review of Systems   Vitals:    03/12/20 1313   BP: 140/80   Pulse:    Resp:      Body mass index is 21.46 kg/m .  Wt Readings from Last 3 Encounters:   03/12/20 125 lb (56.7 kg)   02/27/20 123 lb (55.8 kg)   02/14/20 119 lb 6 oz (54.1 kg)       General Appearance:     Alert, cooperative and in no acute distress.   ENT/Mouth: membranes moist, no oral lesions or bleeding gums.      EYES:  no scleral  icterus, normal conjunctivae   Chest/Lungs:   lungs are clear to auscultation, no rales or wheezing, respirations unlabored   Cardiovascular:   Regular. Normal first and second heart sounds, edema bilateral lower extremities    Abdomen:  Soft, nontender, nondistended, bowel sounds present   Extremities: no cyanosis or clubbing   Skin: warm, dry.    Neurologic: mood and affect are appropriate, alert and oriented x3      General: WNL  Eyes: WNL  Ears/Nose/Throat: WNL  Lungs: WNL  Heart: WNL  Stomach: WNL  Bladder: WNL  Muscle/Joints: WNL  Skin: WNL  Nervous System: WNL  Mental Health: WNL     Blood: WNL     Medical History  Surgical History Family History Social History   Past Medical History:   Diagnosis Date   ? Acute CVA (cerebrovascular accident) (H) 02/14/2020    Hospitalized with acute CVA has complication of coronary angiogram with MRI showing acute/subacute infarcts   ? Appetite loss 5/5/2017   ? ASCVD (arteriosclerotic cardiovascular disease)     Coronary angiogram with moderate disease.  Left bundle branch block possibly responsible for decreased left ventricular systolic function versus frequent PVCs   ? Benign microscopic hematuria     neg workup   ? Chest pain 11/2016    Hospitalization with chest pain, normal pharmacologic nuclear stress test, GERD suspected   ? Chronic anemia 1/4/2016   ? Chronic back pain    ? Chronic constipation    ? Chronic fatigue 5/5/2017   ? Chronic insomnia 2/27/2020   ? Chronic right shoulder pain 4/4/2017   ? Dementia (H) 07/28/2016    SLUMS 20 out of 30 in July 2016 started Namenda earlier this year   ? Diverticulosis    ? Eczema of scalp    ? Gallstones    ? Gastritis 2015    NSAIDs   ? GERD (gastroesophageal reflux disease) 11/8/2016    Responsible for recurrent chest pain    ? Hip fracture (H) 2015    ORIF hip fracture 2015   ? History of skin cancer 12/30/2019   ? HTN (hypertension)    ? Hyperlipidemia    ? Hypothyroidism    ? Low back pain radiating to both legs  07/18/2019    MRI with severe central canal stenosis L3-L4   ? Lumbar radiculopathy     SHERIF 2014 L1-L2   ? Lumbar spinal stenosis     Low back pain with bilateral leg pain with MRI showing severe spinal stenosis L3-L4   ? Moderate protein-calorie malnutrition (H) 11/27/2017   ? Nausea and vomiting 11/27/2017    Recurrent, occurring monthly, hospitalization November 2017, GI evaluation including EGD suggesting esophageal dysmotility but no other abnormalities.  Questioning whether secondary to chronic constipation and now using MiraLAX.  Also holding Exelon patch, mildly elevated lipase of unclear significance March 2018   ? Near syncope 10/03/2016    Abnormal pharmacologic nuclear stress test with small area of infarction involving anterior wall with hypokinesis and decreased left ventricular systolic function.  Unclear if artifactual related to LBBB or recent MI   ? Nonischemic cardiomyopathy (H)     Echocardiogram November 2016 with ejection fraction 35% with global moderate reduction in left ventricular systolic function, aortic sclerosis without significant aortic stenosis, however, cardiac MRI with ejection fraction 45% December 2016 which is more consistent with findings on nuclear stress test, follow-up echocardiogram July 2017 with improved ejection fraction of 50%.     ? NSVT (nonsustained ventricular tachycardia) (H) 11/8/2016    Event monitor 2016   ? Occipital neuritis 2011   ? Osteoarthritis of both hands    ? Osteoarthritis, hip, bilateral    ? Osteoporosis     started Prolia 7/20/15 after hip fx Tscore -1.9, fourth injection given April 4, 2017, follow-up DEXA August 2017 T score -1.6 involving left hip suggesting improvement, -2.4 distal radius   ? Pulmonary nodules 01/05/2017    5 mm nodules bilateral apices, indeterminate but follow-up scan July 2017 unchanged.  Appeared to be benign and no further imaging required   ? Right foot pain 2/27/2020    Sore developing on lateral aspect of foot.  Now  with wider shoes.  Podiatry referral if not improving   ? RLS (restless legs syndrome)    ? Scoliosis of lumbar spine, unspecified scoliosis type 12/21/2018   ? Skin lesion of right ear 12/21/2018   ? Vitamin D deficiency     Past Surgical History:   Procedure Laterality Date   ? BREAST LUMPECTOMY     ? CATARACT EXTRACTION, BILATERAL     ? CHOLECYSTECTOMY  2007   ? COLONOSCOPY  2014   ? CV CORONARY ANGIOGRAM N/A 2/13/2020    Procedure: Coronary Angiogram;  Surgeon: Yan Hickman MD;  Location: [facility] Lab;  Service: Cardiology   ? CV LEFT HEART CATHETERIZATION WO LEFT VETRICULOGRAM Left 2/13/2020    Procedure: Left Heart Catheterization Without Left Ventriculogram;  Surgeon: Yan Hickman MD;  Location: [facility] Cath Lab;  Service: Cardiology   ? DILATION AND CURETTAGE OF UTERUS     ? HEMORROIDECTOMY     ? LUMBAR LAMINECTOMY  2005    L3 - L4   ? ORIF HIP FRACTURE  2015   ? TONSILLECTOMY      Family History   Problem Relation Age of Onset   ? Rheumatologic disease Mother    ? Heart attack Father    ? Sudden death Father 71   ? No Medical Problems Sister    ? No Medical Problems Brother    ? No Medical Problems Brother     Social History     Socioeconomic History   ? Marital status:      Spouse name: Not on file   ? Number of children: 6   ? Years of education: Not on file   ? Highest education level: Not on file   Occupational History   ? Occupation: Homemaker   Social Needs   ? Financial resource strain: Not on file   ? Food insecurity     Worry: Not on file     Inability: Not on file   ? Transportation needs     Medical: Not on file     Non-medical: Not on file   Tobacco Use   ? Smoking status: Never Smoker   ? Smokeless tobacco: Never Used   Substance and Sexual Activity   ? Alcohol use: Yes     Alcohol/week: 1.0 standard drinks     Types: 1 Glasses of wine per week     Comment: glass of wine with dinner   ? Drug use: No   ? Sexual activity: Not on file   Lifestyle   ? Physical  activity     Days per week: Not on file     Minutes per session: Not on file   ? Stress: Not on file   Relationships   ? Social connections     Talks on phone: Not on file     Gets together: Not on file     Attends Sabianism service: Not on file     Active member of club or organization: Not on file     Attends meetings of clubs or organizations: Not on file     Relationship status: Not on file   ? Intimate partner violence     Fear of current or ex partner: Not on file     Emotionally abused: Not on file     Physically abused: Not on file     Forced sexual activity: Not on file   Other Topics Concern   ? Not on file   Social History Narrative   ? Not on file          Medications  Allergies   Current Outpatient Medications   Medication Sig Dispense Refill   ? acetaminophen (TYLENOL) 500 MG tablet Take 1,000 mg by mouth 3 (three) times a day.     ? aspirin 325 MG EC tablet Take 325 mg by mouth daily.     ? calcium carbonate-vitamin D3 (CALCIUM 600 + D,3,) 600 mg(1,500mg) -200 unit per tablet Take 1 tablet by mouth 2 (two) times a day.     ? carvedilol (COREG) 12.5 MG tablet Take 1 tablet (12.5 mg total) by mouth 2 (two) times a day. 180 tablet 3   ? ferrous sulfate 325 (65 FE) MG tablet Take 1 tablet (325 mg total) by mouth 3 (three) times a week. (Patient taking differently: Take 1 tablet by mouth 3 (three) times a week. MWF)  0   ? lansoprazole (PREVACID) 30 MG capsule Take 1 capsule (30 mg total) by mouth 2 (two) times a day. 180 capsule 3   ? levothyroxine (SYNTHROID, LEVOTHROID) 100 MCG tablet Take 1 tablet (100 mcg total) by mouth daily. 90 tablet 2   ? losartan (COZAAR) 50 MG tablet Take 1 tablet (50 mg total) by mouth daily. 90 tablet 3   ? melatonin 3 mg cap Take 3 mg by mouth at bedtime.  0   ? memantine (NAMENDA) 10 MG tablet TAKE 1 TABLET BY MOUTH TWICE DAILY 180 tablet 1   ? polyethylene glycol (MIRALAX) 17 gram packet Take 17 g by mouth daily as needed.      ? rosuvastatin (CRESTOR) 10 MG tablet Take 1  tablet (10 mg total) by mouth at bedtime. 30 tablet 11   ? spironolactone (ALDACTONE) 25 MG tablet Take 1 tablet (25 mg total) by mouth daily. 90 tablet 3   ? TURMERIC ROOT EXTRACT ORAL Take 300 mg by mouth 2 (two) times a day. Noon and Evening     ? vit A/vit C/vit E/zinc/copper (ICAPS AREDS ORAL) Take 1 capsule by mouth 2 (two) times a day. Noon and Evening     ? VITAMIN D2 50,000 unit capsule TAKE 1 CAPSULE (50,000 UNITS TOTAL) BY MOUTH ONCE A WEEK. 12 capsule 2     No current facility-administered medications for this visit.     Allergies   Allergen Reactions   ? Aricept [Donepezil]      Leg cramps     ? Exelon [Rivastigmine]      Nausea/vomiting   ? Lisinopril      Hypotension   ? Statins-Hmg-Coa Reductase Inhibitors Myalgia   ? Sulfa (Sulfonamide Antibiotics) Rash         Lab Results    Chemistry/lipid CBC Cardiac Enzymes/BNP/TSH/INR   Lab Results   Component Value Date    CHOL 272 (H) 12/30/2019    HDL 56 12/30/2019    LDLCALC 179 (H) 12/30/2019    TRIG 183 (H) 12/30/2019    CREATININE 1.18 (H) 02/27/2020    BUN 29 (H) 02/27/2020    K 5.0 02/27/2020     (L) 02/27/2020    CL 98 02/27/2020    CO2 25 02/27/2020    Lab Results   Component Value Date    WBC 8.4 02/14/2020    HGB 13.0 02/14/2020    HCT 40.7 02/14/2020     02/14/2020     02/14/2020    Lab Results   Component Value Date    TROPONINI 1.09 (HH) 02/14/2020     10/31/2016    TSH 0.93 12/30/2019    INR 0.97 02/14/2020

## 2021-06-29 NOTE — PROGRESS NOTES
"Progress Notes by Yan Hickman MD at 5/7/2020  8:50 AM     Author: Yan Hickman MD Service: -- Author Type: Physician    Filed: 5/7/2020  9:19 AM Encounter Date: 5/7/2020 Status: Signed    : Yan Hickman MD (Physician)           The patient has been notified of following:     \"This telephone visit will be conducted via a call between you and your physician/provider. We have found that certain health care needs can be provided without the need for a physical exam.  This service lets us provide the care you need with a phone conversation.  If a prescription is necessary we can send it directly to your pharmacy.  If lab work is needed we can place an order for that and you can then stop by our lab to have the test done at a later time. If during the course of the call the physician/provider feels a telephone visit is not appropriate, you will not be charged for this service.\" Verbal consent has been obtained for this service by care team member:         HEART CARE PHONE ENCOUNTER        The patient has chosen to have the visit conducted as a telephone visit, to reduce risk of exposure given the current status of Coronavirus in our community. This telephone visit is being conducted via a call between the patient and physician/provider. Health care needs are being provided without a physical exam.     Assessment/Recommendations   Assessment:  85F w/ cardiomyopathy (LVEF 35-65-back to 40% now), HTN, HL, h/o NSVT, episodes of near-syncope, dementia who is here for f/u of cardiomyopathy.      - she had only moderate disease in dLAD and mRCA - assessed w/ FFR for LAD and negative, PD/PA 1, so no need for FFR in RCA and normal LVEDP  - this suggested a possibility of LBBB as the culprit for her cardiomyopathy; PVC's may be another (albeit less likely) possibility  - re-check echo showed LVEF improvement in 40% range on good doses of carvedilol/losartan and with addition of  spironolactone, Holter study " to assess PVC burden confirmed LBBB w. 1 st degree AVB and moderate PVC burden  - we dicussed potential role for CRT but given that she is currently out of the ICD range, and doing well, patient and her family would prefer to avoid procedures at this time, and I tend to agree with this course  - will plan to re-check TTE end of summer and if EF is <35% then, would seek EP input for potential candidacy fort CRT-D, otherwise continue guideline-directed therapy for her HF  - ASA 81mg daily indefinitely, add rosuva 10  - continue aggressive risk factor modification    Follow Up Plan: Follow up in 1 year or as needed  I have reviewed the note as documented.  This accurately captures the substance of my conversation with the patient.    Total time of call between patient and provider was 18 minutes   Start Time:859  Stop Time:917       History of Present Illness/Subjective    Karen Arthur is a 85 y.o. female who is being evaluated via a billable telephone visit.      She has been doing well and denies CP/SOB/WALTERS/orthopnea/PND/edema/N.V/D/F/C/wt.changes.  Does have occasional fatigue but not every day. Does her morning exercises and walks.    I have reviewed and updated the patient's Past Medical History, Social History, Family History and Medication List.     Physical Examination not performed given phone encounter Review of Systems                                                Medical History  Surgical History Family History Social History   Past Medical History:   Diagnosis Date   ? Acute CVA (cerebrovascular accident) (H) 02/14/2020    Hospitalized with acute CVA has complication of coronary angiogram with MRI showing acute/subacute infarcts   ? Appetite loss 5/5/2017   ? ASCVD (arteriosclerotic cardiovascular disease)     Coronary angiogram with moderate disease.  Left bundle branch block possibly responsible for decreased left ventricular systolic function versus frequent PVCs   ? Benign microscopic hematuria      neg workup   ? Chest pain 11/2016    Hospitalization with chest pain, normal pharmacologic nuclear stress test, GERD suspected   ? Chronic anemia 1/4/2016   ? Chronic back pain    ? Chronic constipation    ? Chronic fatigue 5/5/2017   ? Chronic insomnia 2/27/2020   ? Chronic right shoulder pain 4/4/2017   ? Dementia (H) 07/28/2016    SLUMS 20 out of 30 in July 2016 started Namenda earlier this year   ? Diverticulosis    ? Eczema of scalp    ? Gallstones    ? Gastritis 2015    NSAIDs   ? GERD (gastroesophageal reflux disease) 11/8/2016    Responsible for recurrent chest pain    ? Hip fracture (H) 2015    ORIF hip fracture 2015   ? History of skin cancer 12/30/2019   ? HTN (hypertension)    ? Hyperlipidemia    ? Hypothyroidism    ? Low back pain radiating to both legs 07/18/2019    MRI with severe central canal stenosis L3-L4   ? Lumbar radiculopathy     SHERIF 2014 L1-L2   ? Lumbar spinal stenosis     Low back pain with bilateral leg pain with MRI showing severe spinal stenosis L3-L4   ? Moderate protein-calorie malnutrition (H) 11/27/2017   ? Nausea and vomiting 11/27/2017    Recurrent, occurring monthly, hospitalization November 2017, GI evaluation including EGD suggesting esophageal dysmotility but no other abnormalities.  Questioning whether secondary to chronic constipation and now using MiraLAX.  Also holding Exelon patch, mildly elevated lipase of unclear significance March 2018   ? Near syncope 10/03/2016    Abnormal pharmacologic nuclear stress test with small area of infarction involving anterior wall with hypokinesis and decreased left ventricular systolic function.  Unclear if artifactual related to LBBB or recent MI   ? Nonischemic cardiomyopathy (H)     Echocardiogram November 2016 with ejection fraction 35% with global moderate reduction in left ventricular systolic function, aortic sclerosis without significant aortic stenosis, however, cardiac MRI with ejection fraction 45% December 2016 which is more  consistent with findings on nuclear stress test, follow-up echocardiogram July 2017 with improved ejection fraction of 50%.     ? NSVT (nonsustained ventricular tachycardia) (H) 11/8/2016    Event monitor 2016   ? Occipital neuritis 2011   ? Osteoarthritis of both hands    ? Osteoarthritis, hip, bilateral    ? Osteoporosis     started Prolia 7/20/15 after hip fx Tscore -1.9, fourth injection given April 4, 2017, follow-up DEXA August 2017 T score -1.6 involving left hip suggesting improvement, -2.4 distal radius   ? Pulmonary nodules 01/05/2017    5 mm nodules bilateral apices, indeterminate but follow-up scan July 2017 unchanged.  Appeared to be benign and no further imaging required   ? Right foot pain 2/27/2020    Sore developing on lateral aspect of foot.  Now with wider shoes.  Podiatry referral if not improving   ? RLS (restless legs syndrome)    ? Scoliosis of lumbar spine, unspecified scoliosis type 12/21/2018   ? Skin lesion of right ear 12/21/2018   ? Vitamin D deficiency     Past Surgical History:   Procedure Laterality Date   ? BREAST LUMPECTOMY     ? CATARACT EXTRACTION, BILATERAL     ? CHOLECYSTECTOMY  2007   ? COLONOSCOPY  2014   ? CV CORONARY ANGIOGRAM N/A 2/13/2020    Procedure: Coronary Angiogram;  Surgeon: Yan Hickman MD;  Location: [facility] Cath Lab;  Service: Cardiology   ? CV LEFT HEART CATHETERIZATION WO LEFT VETRICULOGRAM Left 2/13/2020    Procedure: Left Heart Catheterization Without Left Ventriculogram;  Surgeon: Yan Hickman MD;  Location: [facility] Cath Lab;  Service: Cardiology   ? DILATION AND CURETTAGE OF UTERUS     ? HEMORROIDECTOMY     ? LUMBAR LAMINECTOMY  2005    L3 - L4   ? ORIF HIP FRACTURE  2015   ? TONSILLECTOMY      Family History   Problem Relation Age of Onset   ? Rheumatologic disease Mother    ? Heart attack Father    ? Sudden death Father 71   ? No Medical Problems Sister    ? No Medical Problems Brother    ? No Medical Problems Brother     Social  History     Socioeconomic History   ? Marital status:      Spouse name: Not on file   ? Number of children: 6   ? Years of education: Not on file   ? Highest education level: Not on file   Occupational History   ? Occupation: Homemaker   Social Needs   ? Financial resource strain: Not on file   ? Food insecurity     Worry: Not on file     Inability: Not on file   ? Transportation needs     Medical: Not on file     Non-medical: Not on file   Tobacco Use   ? Smoking status: Never Smoker   ? Smokeless tobacco: Never Used   Substance and Sexual Activity   ? Alcohol use: Yes     Alcohol/week: 1.0 standard drinks     Types: 1 Glasses of wine per week     Comment: glass of wine with dinner   ? Drug use: No   ? Sexual activity: Not on file   Lifestyle   ? Physical activity     Days per week: Not on file     Minutes per session: Not on file   ? Stress: Not on file   Relationships   ? Social connections     Talks on phone: Not on file     Gets together: Not on file     Attends Christianity service: Not on file     Active member of club or organization: Not on file     Attends meetings of clubs or organizations: Not on file     Relationship status: Not on file   ? Intimate partner violence     Fear of current or ex partner: Not on file     Emotionally abused: Not on file     Physically abused: Not on file     Forced sexual activity: Not on file   Other Topics Concern   ? Not on file   Social History Narrative   ? Not on file          Medications  Allergies   Current Outpatient Medications   Medication Sig Dispense Refill   ? acetaminophen (TYLENOL) 500 MG tablet Take 1,000 mg by mouth 3 (three) times a day.     ? aspirin 325 MG EC tablet Take 325 mg by mouth daily.     ? calcium carbonate-vitamin D3 (CALCIUM 600 + D,3,) 600 mg(1,500mg) -200 unit per tablet Take 1 tablet by mouth 2 (two) times a day.     ? carvedilol (COREG) 12.5 MG tablet Take 1 tablet (12.5 mg total) by mouth 2 (two) times a day. 180 tablet 3   ? ferrous  sulfate 325 (65 FE) MG tablet Take 1 tablet (325 mg total) by mouth 3 (three) times a week. (Patient taking differently: Take 1 tablet by mouth 3 (three) times a week. [facility])  0   ? lansoprazole (PREVACID) 30 MG capsule Take 1 capsule (30 mg total) by mouth 2 (two) times a day. 180 capsule 3   ? levothyroxine (SYNTHROID, LEVOTHROID) 100 MCG tablet Take 1 tablet (100 mcg total) by mouth daily. 90 tablet 2   ? losartan (COZAAR) 50 MG tablet Take 1 tablet (50 mg total) by mouth daily. 90 tablet 3   ? melatonin 3 mg cap Take 3 mg by mouth at bedtime.  0   ? memantine (NAMENDA) 10 MG tablet TAKE 1 TABLET BY MOUTH TWICE DAILY. 180 tablet 1   ? nabumetone (RELAFEN) 750 MG tablet Take 1 tablet (750 mg total) by mouth 2 (two) times a day. 60 tablet 0   ? polyethylene glycol (MIRALAX) 17 gram packet Take 17 g by mouth daily as needed.      ? rosuvastatin (CRESTOR) 20 MG tablet Take 1 tablet (20 mg total) by mouth at bedtime. 90 tablet 3   ? spironolactone (ALDACTONE) 25 MG tablet Take 1 tablet (25 mg total) by mouth daily. 90 tablet 3   ? TURMERIC ROOT EXTRACT ORAL Take 300 mg by mouth 2 (two) times a day. Noon and Evening     ? vit A/vit C/vit E/zinc/copper (ICAPS AREDS ORAL) Take 1 capsule by mouth 2 (two) times a day. Noon and Evening     ? VITAMIN D2 50,000 unit capsule TAKE 1 CAPSULE (50,000 UNITS TOTAL) BY MOUTH ONCE A WEEK. 12 capsule 2     No current facility-administered medications for this visit.     Allergies   Allergen Reactions   ? Aricept [Donepezil]      Leg cramps     ? Exelon [Rivastigmine]      Nausea/vomiting   ? Lisinopril      Hypotension   ? Statins-Hmg-Coa Reductase Inhibitors Myalgia   ? Sulfa (Sulfonamide Antibiotics) Rash         Lab Results    Chemistry/lipid CBC Cardiac Enzymes/BNP/TSH/INR   Lab Results   Component Value Date    CHOL 272 (H) 12/30/2019    HDL 56 12/30/2019    LDLCALC 179 (H) 12/30/2019    TRIG 183 (H) 12/30/2019    CREATININE 1.18 (H) 02/27/2020    BUN 29 (H) 02/27/2020    K 5.0  02/27/2020     (L) 02/27/2020    CL 98 02/27/2020    CO2 25 02/27/2020    Lab Results   Component Value Date    WBC 8.4 02/14/2020    HGB 13.0 02/14/2020    HCT 40.7 02/14/2020     02/14/2020     02/14/2020    Lab Results   Component Value Date    TROPONINI 1.09 (HH) 02/14/2020     10/31/2016    TSH 0.93 12/30/2019    INR 0.97 02/14/2020        Yan Hickman

## 2021-06-30 NOTE — PROGRESS NOTES
Progress Notes by Chanel Rodríguez CNP at 2/8/2021  3:30 PM     Author: Chanel Rodríguez CNP Service: -- Author Type: Nurse Practitioner    Filed: 2/8/2021  3:42 PM Encounter Date: 2/8/2021 Status: Signed    : Chnael Rodríguez CNP (Nurse Practitioner)             Assessment/Recommendations   Assessment:    1.  Heart failure with reduced ejection fraction: Ejection fraction has improved from 30% to 45%.  She has no symptoms of acute heart failure.    2.  Hypertension: Blood pressure stable today at 136/62.    3.  Coronary artery disease: Coronary angiogram in February 2020 showed moderate disease in distal LAD and mid RCA. She denies any chest pain.     Plan:  1.   Heart failure medications:  - Beta blocker therapy with carvedilol 12.5 mg twice daily  - ARB therapy with losartan 50 mg daily  2. BMP from February 2, 2021 reviewed.  I reviewed lab results with her and Dr. Parker's recommendations on dose adjustments of vitamin D and levothyroxine.  Her daughter had not checked my chart yet to see these changes.   3.  Continue low-sodium diet and daily weights    Karen Arthur will follow up with Dr. Hickman in May. Follow up with me as needed.       History of Present Illness/Subjective    Ms. Karen Arthur is a 85 y.o. female seen at [facility] today for follow-up.  She has a history of heart failure with reduced ejection fraction, hypertension, dyslipidemia, coronary artery disease, and dementia.  Ejection fraction was 30% in January 2020.  Echocardiogram from August 2020 showed improvement of ejection fraction to 45%.    Karen Roper is doing well with no symptoms of acute heart failure.  She has occasional fatigue but denies any worsening.  She denies lightheadedness, shortness of breath, dyspnea on exertion, chest pain and lower extremity edema.      Her weight has remained stable.      ECHO:   Results for orders placed during the hospital encounter of  08/17/20   Echo Complete [ECH10] 08/17/2020    Narrative   1.Left ventricle ejection fraction is mildly decreased. The calculated   left ventricular ejection fraction is 45%.    2.Left ventricular hypertrophy, more pronounced septal hypokinesis,   possibly secondary to old left bundle branch block pattern, although   cannot rule out old septal MI.    3.TAPSE is normal, which is consistent with normal right ventricular   systolic function.    4.Moderate tricuspid with mild pulmonic insufficiencies.    5.When compared to the previous study dated 2/15/2020, no significant   change. If anything, septum appears more vigorous on current study, and   ECG tracing shows narrowed complexes.           Physical Examination Review of Systems   Vitals:    02/08/21 1532   BP: 136/62   Pulse:    Resp:      Body mass index is 22.14 kg/m .  Wt Readings from Last 3 Encounters:   02/08/21 123 lb (55.8 kg)   02/02/21 120 lb (54.4 kg)   08/17/20 121 lb (54.9 kg)       General Appearance:     Alert, cooperative and in no acute distress.   ENT/Mouth: membranes moist, no oral lesions or bleeding gums.      EYES:  no scleral icterus, normal conjunctivae   Chest/Lungs:   lungs are clear to auscultation, no rales or wheezing, respirations unlabored   Cardiovascular:   Regular. Normal first and second heart sounds, no edema bilateral lower extremities    Abdomen:  Soft, nontender, nondistended, bowel sounds present   Extremities: no cyanosis or clubbing   Skin: warm, dry.    Neurologic: mood and affect are appropriate, alert and oriented x3                                                Medical History  Surgical History Family History Social History   Past Medical History:   Diagnosis Date   ? Acute CVA (cerebrovascular accident) (H) 02/14/2020    Hospitalized with acute CVA has complication of coronary angiogram with MRI showing acute/subacute infarcts   ? Appetite loss 05/05/2017   ? ASCVD (arteriosclerotic cardiovascular disease)      Coronary angiogram with moderate disease.  Left bundle branch block possibly responsible for decreased left ventricular systolic function versus frequent PVCs   ? Benign microscopic hematuria     neg workup   ? Chest pain 11/2016    Hospitalization with chest pain, normal pharmacologic nuclear stress test, GERD suspected   ? Chronic anemia 01/04/2016   ? Chronic back pain    ? Chronic constipation    ? Chronic fatigue 05/05/2017   ? Chronic insomnia 02/27/2020   ? Chronic right shoulder pain 04/04/2017   ? Dementia (H) 07/28/2016    SLUMS 20 out of 30 in July 2016 started Namenda earlier this year   ? Diverticulosis    ? Eczema of scalp    ? Gallstones    ? Gastritis 2015    NSAIDs   ? GERD (gastroesophageal reflux disease) 11/08/2016    Responsible for recurrent chest pain    ? Hip fracture (H) 2015    ORIF hip fracture 2015   ? History of CVA (cerebrovascular accident) 05/04/2020    Hospitalized with acute CVA has complication of coronary angiogram with MRI showing acute/subacute infarcts   ? History of skin cancer 12/30/2019   ? HTN (hypertension)    ? Hyperlipidemia    ? Hypothyroidism    ? Low back pain radiating to both legs 07/18/2019    MRI with severe central canal stenosis L3-L4   ? Lumbar radiculopathy     SHERIF 2014 L1-L2   ? Lumbar spinal stenosis     Low back pain with bilateral leg pain with MRI showing severe spinal stenosis L3-L4 and bilateral foraminal stenosis.  SHERIF August 2019 and repeated July 2020   ? Moderate protein-calorie malnutrition (H) 11/27/2017   ? Nausea and vomiting 11/27/2017    Recurrent, occurring monthly, hospitalization November 2017, GI evaluation including EGD suggesting esophageal dysmotility but no other abnormalities.  Questioning whether secondary to chronic constipation and now using MiraLAX.  Also holding Exelon patch, mildly elevated lipase of unclear significance March 2018   ? Near syncope 10/03/2016    Abnormal pharmacologic nuclear stress test with small area of  infarction involving anterior wall with hypokinesis and decreased left ventricular systolic function.  Unclear if artifactual related to LBBB or recent MI   ? Nonischemic cardiomyopathy (H)     Echocardiogram November 2016 with ejection fraction 35% with global moderate reduction in left ventricular systolic function, aortic sclerosis without significant aortic stenosis, however, cardiac MRI with ejection fraction 45% December 2016 which is more consistent with findings on nuclear stress test, follow-up echocardiogram July 2017 with improved ejection fraction of 50%.     ? NSVT (nonsustained ventricular tachycardia) (H) 11/08/2016    Event monitor 2016   ? Occipital neuritis 2011   ? Osteoarthritis of both hands    ? Osteoarthritis, hip, bilateral    ? Osteoporosis     started Prolia 7/20/15 after hip fx Tscore -1.9, fourth injection given April 4, 2017, follow-up DEXA August 2017 T score -1.6 involving left hip suggesting improvement, -2.4 distal radius.  Completed Prolia February 2020   ? Pulmonary nodules 01/05/2017    5 mm nodules bilateral apices, indeterminate but follow-up scan July 2017 unchanged.  Appeared to be benign and no further imaging required   ? Right foot pain 02/27/2020    Sore developing on lateral aspect of foot.  Now with wider shoes.  Podiatry referral if not improving   ? RLS (restless legs syndrome)    ? Scoliosis of lumbar spine, unspecified scoliosis type 12/21/2018   ? Skin lesion of right ear 12/21/2018   ? Vitamin D deficiency     Past Surgical History:   Procedure Laterality Date   ? BREAST LUMPECTOMY     ? CATARACT EXTRACTION, BILATERAL     ? CHOLECYSTECTOMY  2007   ? COLONOSCOPY  2014   ? CV CORONARY ANGIOGRAM N/A 2/13/2020    Procedure: Coronary Angiogram;  Surgeon: Yan Hickman MD;  Location: [facility] Cath Lab;  Service: Cardiology   ? CV LEFT HEART CATHETERIZATION WO LEFT VETRICULOGRAM Left 2/13/2020    Procedure: Left Heart Catheterization Without Left Ventriculogram;   Surgeon: Yan Hickman MD;  Location: [facility];  Service: Cardiology   ? DILATION AND CURETTAGE OF UTERUS     ? HEMORROIDECTOMY     ? LUMBAR LAMINECTOMY  2005    L3 - L4   ? ORIF HIP FRACTURE  2015   ? TONSILLECTOMY      Family History   Problem Relation Age of Onset   ? Rheumatologic disease Mother    ? Heart attack Father    ? Sudden death Father 71   ? No Medical Problems Sister    ? No Medical Problems Brother    ? No Medical Problems Brother     Social History     Socioeconomic History   ? Marital status:      Spouse name: Not on file   ? Number of children: 6   ? Years of education: Not on file   ? Highest education level: Not on file   Occupational History   ? Occupation: Homemaker   Social Needs   ? Financial resource strain: Not on file   ? Food insecurity     Worry: Not on file     Inability: Not on file   ? Transportation needs     Medical: Not on file     Non-medical: Not on file   Tobacco Use   ? Smoking status: Never Smoker   ? Smokeless tobacco: Never Used   Substance and Sexual Activity   ? Alcohol use: Yes     Alcohol/week: 1.0 standard drinks     Types: 1 Glasses of wine per week     Comment: glass of wine with dinner   ? Drug use: No   ? Sexual activity: Not on file   Lifestyle   ? Physical activity     Days per week: Not on file     Minutes per session: Not on file   ? Stress: Not on file   Relationships   ? Social connections     Talks on phone: Not on file     Gets together: Not on file     Attends [employer]oration service: Not on file     Active member of club or organization: Not on file     Attends meetings of clubs or organizations: Not on file     Relationship status: Not on file   ? Intimate partner violence     Fear of current or ex partner: Not on file     Emotionally abused: Not on file     Physically abused: Not on file     Forced sexual activity: Not on file   Other Topics Concern   ? Not on file   Social History Narrative   ? Not on file          Medications   Allergies   Current Outpatient Medications   Medication Sig Dispense Refill   ? acetaminophen (TYLENOL) 500 MG tablet Take 1,000 mg by mouth 3 (three) times a day.     ? aspirin 325 MG EC tablet Take 325 mg by mouth daily.     ? carvediloL (COREG) 12.5 MG tablet Take 1 tablet (12.5 mg total) by mouth 2 (two) times a day. 180 tablet 2   ? cholecalciferol, vitamin D3, 50 mcg (2,000 unit) Tab 1 daily  0   ? lansoprazole (PREVACID) 30 MG capsule Take 1 capsule (30 mg total) by mouth daily. 90 capsule 3   ? levothyroxine (SYNTHROID) 75 MCG tablet Take 1 tablet (75 mcg total) by mouth daily. 90 tablet 3   ? losartan (COZAAR) 50 MG tablet Take 1 tablet (50 mg total) by mouth daily. 90 tablet 3   ? melatonin 3 mg cap Take 3 mg by mouth at bedtime.  0   ? memantine (NAMENDA) 10 MG tablet TAKE 1 TABLET BY MOUTH TWICE DAILY. 180 tablet 1   ? nabumetone (RELAFEN) 750 MG tablet Take 1 tablet (750 mg total) by mouth 2 (two) times a day. 60 tablet 11   ? polyethylene glycol (MIRALAX) 17 gram packet Take 17 g by mouth daily as needed.      ? rosuvastatin (CRESTOR) 20 MG tablet Take 1 tablet (20 mg total) by mouth at bedtime. 90 tablet 3     No current facility-administered medications for this visit.     Allergies   Allergen Reactions   ? Aricept [Donepezil]      Leg cramps     ? Exelon [Rivastigmine]      Nausea/vomiting   ? Lisinopril      Hypotension   ? Statins-Hmg-Coa Reductase Inhibitors Myalgia   ? Sulfa (Sulfonamide Antibiotics) Rash         Lab Results    Chemistry/lipid CBC Cardiac Enzymes/BNP/TSH/INR   Lab Results   Component Value Date    CHOL 180 02/02/2021    HDL 62 02/02/2021    LDLCALC 97 02/02/2021    TRIG 107 02/02/2021    CREATININE 1.20 (H) 02/02/2021    BUN 29 (H) 02/02/2021    K 4.7 02/02/2021     02/02/2021     02/02/2021    CO2 25 02/02/2021    Lab Results   Component Value Date    WBC 7.4 02/02/2021    HGB 11.7 (L) 02/02/2021    HCT 36.1 02/02/2021    MCV 99 02/02/2021     02/02/2021     Lab Results   Component Value Date    CKTOTAL 129 07/07/2020    TROPONINI 1.09 (HH) 02/14/2020     10/31/2016    TSH 0.26 (L) 02/02/2021    INR 0.97 02/14/2020

## 2021-07-08 ENCOUNTER — COMMUNICATION - HEALTHEAST (OUTPATIENT)
Dept: INTERNAL MEDICINE | Facility: CLINIC | Age: 86
End: 2021-07-08

## 2021-07-08 DIAGNOSIS — I42.8 NONISCHEMIC CARDIOMYOPATHY (H): ICD-10-CM

## 2021-07-08 RX ORDER — CARVEDILOL 12.5 MG/1
12.5 TABLET ORAL 2 TIMES DAILY
Qty: 180 TABLET | Refills: 2 | Status: SHIPPED | OUTPATIENT
Start: 2021-07-08 | End: 2022-03-28

## 2021-07-08 NOTE — TELEPHONE ENCOUNTER
Telephone Encounter by Barber Sheth, RN at 7/8/2021  8:13 AM     Author: Barber Sheth RN Service: -- Author Type: Registered Nurse    Filed: 7/8/2021  8:14 AM Encounter Date: 7/8/2021 Status: Signed    : Barber Sheth, RN (Registered Nurse)       Refill Approved    Rx renewed per Medication Renewal Policy. Medication was last renewed on 10/13/20.    Barber Sheth, [facility] Connection Triage/Med Refill 7/8/2021     Requested Prescriptions   Pending Prescriptions Disp Refills   ? carvediloL (COREG) 12.5 MG tablet [Pharmacy Med Name: Carvedilol Oral Tablet 12.5 MG] 180 tablet 0     Sig: Take 1 tablet (12.5 mg total) by mouth 2 (two) times a day.       Beta-Blockers Refill Protocol Passed - 7/8/2021  2:01 AM        Passed - PCP or prescribing provider visit in past 12 months or next 3 months     Last office visit with prescriber/PCP: 7/7/2020 Kehinde Tate MD OR same dept: Visit date not found OR same specialty: 7/7/2020 Kehinde Tate MD  Last physical: 2/2/2021 Last MTM visit: Visit date not found   Next visit within 3 mo: Visit date not found  Next physical within 3 mo: Visit date not found  Prescriber OR PCP: Kehinde Tate MD  Last diagnosis associated with med order: 1. Nonischemic cardiomyopathy (H)  - carvediloL (COREG) 12.5 MG tablet [Pharmacy Med Name: Carvedilol Oral Tablet 12.5 MG]; Take 1 tablet (12.5 mg total) by mouth 2 (two) times a day.  Dispense: 180 tablet; Refill: 0    If protocol passes may refill for 12 months if within 3 months of last provider visit (or a total of 15 months).             Passed - Blood pressure filed in past 12 months     BP Readings from Last 1 Encounters:   02/08/21 136/62

## 2021-07-13 ENCOUNTER — RECORDS - HEALTHEAST (OUTPATIENT)
Dept: ADMINISTRATIVE | Facility: CLINIC | Age: 86
End: 2021-07-13

## 2021-07-14 PROBLEM — Z86.73 HISTORY OF CVA (CEREBROVASCULAR ACCIDENT): Status: RESOLVED | Noted: 2020-05-04 | Resolved: 2021-02-02

## 2021-07-14 PROBLEM — I63.9 ACUTE CVA (CEREBROVASCULAR ACCIDENT) (H): Status: RESOLVED | Noted: 2020-02-14 | Resolved: 2020-05-04

## 2021-07-21 ENCOUNTER — RECORDS - HEALTHEAST (OUTPATIENT)
Dept: ADMINISTRATIVE | Facility: CLINIC | Age: 86
End: 2021-07-21

## 2021-07-29 ENCOUNTER — TELEPHONE (OUTPATIENT)
Dept: PHYSICAL MEDICINE AND REHAB | Facility: CLINIC | Age: 86
End: 2021-07-29

## 2021-07-29 NOTE — TELEPHONE ENCOUNTER
"PSP:  Ailin Colunga PA-C   Last clinic visit: 7/16/21 OV; 7/22/20 L5-S1 ILESI  Reason for call: Severe pain that started today  Clinical information:   calling to see if another injection would help his wife. \"I can hardly get her to move. I think it is sciatica. Started today out of the blue. It is in the right buttock and then down outside of her leg to past the knee.\"   Advice given to patient: PSP will be updated on status. He will be called with response. Explained PSP does not have any availability for her to be seen today or tomorrow. May be able to be seen by a different provider. Will call him back once have discussed with providers.Stated understanding.   Provider to address: FYI; will check with Dr. Gallagher if he will see  "

## 2021-07-30 NOTE — TELEPHONE ENCOUNTER
"Schedules reviewed. All providers fully booked.    Follow up call to patient's . He reports \"things are much better today.\" Offered a follow up visit with PSP to reassess patient's low back pain as it has been over a year since last in. Due to PSP's availability as well as patient's, will schedule for 3rd week of August. May cancel if improved and does not need the appointment. Assisted with scheduling the appointment.    "

## 2021-08-02 NOTE — PROGRESS NOTES
[facility] HEART [facility]  Arrhythmia Clinic  [place]    Referring:      Assessment:         Nonischemic cardiomyopathy: The patient's left ventricular systolic performance has steadily improved and by her most recent echo from last month she is now at the lower limits of normal.  This likely is secondary to her combined beta-blocker and ARB therapy.  The patient is tolerating those medications and should remain on those indefinitely.    Hypertension: Patient's blood pressure is at target and if possible further up titration of her beta-blocker should occur at her next heart failure clinic visit.    Nonsustained ventricular tachycardia: No symptoms to suggest any symptomatic events.      Recommendations:    No change in her current medical therapy    Encourage the patient to try and increase her daily hydration.  I requested that she try and achieve 60 ounces of non-caffeinated liquid daily.    Follow-up in the heart failure clinic and 6 months with Chanel Rodríguez NP      Patient Active Problem List   Diagnosis     Osteoporosis     Hypothyroidism     Chronic back pain     Hyperlipidemia     Osteoarthritis of both hands     Vitamin D deficiency     Gastritis     RLS (restless legs syndrome)     Osteoarthritis, hip, bilateral     Chronic constipation     Chronic anemia     Dementia     Near syncope     Essential hypertension     NSVT (nonsustained ventricular tachycardia)     GERD (gastroesophageal reflux disease)     Chronic right shoulder pain     Chronic fatigue     Nonischemic cardiomyopathy       Subjective:  Karen Arthur (82 y.o. female) returns to the arrhythmia clinic for interval reevaluation of her herpes test nonsustained VT and presyncope syndrome.  The patient has not had any significant progression in her memory loss.  The patient has been recalls for her that she has had 2 episodes of feeling nausea, somewhat sick but not having severe presyncope and no syncopal episodes.  She reports  I called the patient and she will come tomorrow at 10:00 to see Dr. Araya.  Fredis put on his schedule.   no tachypalpitations.  She is not having any chest pain, exertional dyspnea, or orthopnea.  She is tolerating her medical therapy (carvedilol and losartan) without any difficulties.    Current Outpatient Prescriptions   Medication Sig Dispense Refill     acetaminophen (TYLENOL) 500 MG tablet Take 1,000 mg by mouth 3 (three) times a day.       aspirin 325 MG EC tablet Take 325 mg by mouth daily.       calcium carbonate-vitamin D2 500 mg(1,250mg) -200 unit tablet Take 1 tablet by mouth 2 (two) times a day.       carvedilol (COREG) 12.5 MG tablet Take 1 tablet (12.5 mg total) by mouth 2 (two) times a day with meals. 180 tablet 3     diphenhydrAMINE (BENADRYL) 25 mg tablet Take 50 mg by mouth bedtime.       ergocalciferol (VITAMIN D2) 50,000 unit capsule Take 1 capsule (50,000 Units total) by mouth once a week. 13 capsule 3     ferrous sulfate 325 (65 FE) MG tablet Take 1 tablet (325 mg total) by mouth 3 (three) times a week.  0     lansoprazole (PREVACID) 30 MG capsule Take 1 capsule (30 mg total) by mouth 2 (two) times a day. 180 capsule 3     levothyroxine (SYNTHROID, LEVOTHROID) 100 MCG tablet Take 1 tablet (100 mcg total) by mouth daily. 90 tablet 3     losartan (COZAAR) 50 MG tablet Take 1 tablet (50 mg total) by mouth at bedtime. 90 tablet 3     memantine (NAMENDA) 10 MG tablet Take 1 tablet (10 mg total) by mouth 2 (two) times a day. 180 tablet 3     nabumetone (RELAFEN) 750 MG tablet Take 1 tablet (750 mg total) by mouth 2 (two) times a day. 180 tablet 3     polyethylene glycol (MIRALAX) 17 gram packet Take 17 g by mouth daily as needed.        rivastigmine (EXELON) 9.5 mg/24 hr Place 1 patch on the skin daily. 30 patch 12     TURMERIC ROOT EXTRACT ORAL Take 350 mg by mouth daily.        Current Facility-Administered Medications   Medication Dose Route Frequency Provider Last Rate Last Dose     denosumab 60 mg (PROLIA 60 mg/ml)  60 mg Subcutaneous Q6 Months Kehinde Tate MD   60 mg at 04/04/17 1915  "      Review of Systems:   General: WNL  Eyes: WNL  Ears/Nose/Throat: WNL  Lungs: WNL  Heart: WNL  Stomach: Nausea  Bladder: WNL  Muscle/Joints: WNL  Skin: WNL  Nervous System: WNL  Mental Health: WNL     Blood: WNL    Family History  Family History   Problem Relation Age of Onset     No Medical Problems Mother      age 96     Heart attack Father      Sudden death Father 71     No Medical Problems Sister      No Medical Problems Brother      No Medical Problems Brother        Social History   reports that she has never smoked. She has never used smokeless tobacco. She reports that she drinks about 0.6 oz of alcohol per week  She reports that she does not use illicit drugs.    Objective:   Vital signs in last 24 hours:  /68 (Patient Site: Left Arm, Patient Position: Sitting, Cuff Size: Adult Regular)  Pulse 72  Ht 5' 4\" (1.626 m)  Wt 123 lb (55.8 kg)  BMI 21.11 kg/m2  Weight: Weight: 123 lb (55.8 kg)     Physical Exam:  General: The patient is alert oriented to person place and situation.  The patient is in no acute distress at the time of my evaluation.  Eyes: Pupils are equal, round, and reactive to light.  Conjunctiva and sclera are clear.  ENT: Oral mucosa is moist and without redness. No evident nasal discharge.  Pulmonary: Lungs are clear bilaterally with no rales, rhonchi, or wheezes.    Cardiovascular exam: Rhythm is regular. S1 and S2 are normal. No significant murmur is present. JVP is normal. Lower extremities demonstrate no significant edema. Distal pulses are intact bilaterally.  Abdomen is flat, soft, and nontender.  Musculoskeletal: Spine is straight. Extremities without deformity.  Neuro: Gait is normal.   Skin is warm, dry, and otherwise intact.      Cardiographics:       Lab Results:   Lab Results   Component Value Date     08/01/2017    K 4.5 08/01/2017     08/01/2017    CO2 27 08/01/2017    BUN 16 08/01/2017    CREATININE 0.92 08/01/2017    CALCIUM 9.4 08/01/2017     Lab " Results   Component Value Date    WBC 8.1 05/05/2017    HGB 13.2 08/01/2017    HCT 35.3 05/05/2017    MCV 98 05/05/2017     05/05/2017     Lab Results   Component Value Date    INR 0.91 10/31/2016         Outside record review:

## 2021-08-03 PROBLEM — J69.0 ASPIRATION PNEUMONIA (H): Status: RESOLVED | Noted: 2017-11-26 | Resolved: 2017-12-05

## 2021-08-03 PROBLEM — E44.0 MODERATE PROTEIN-CALORIE MALNUTRITION (H): Status: RESOLVED | Noted: 2017-11-27 | Resolved: 2018-12-21

## 2021-08-12 NOTE — PROGRESS NOTES
Assessment:   Karen Arthur is a 85 y.o. y.o. female with past medical history significant for hypothyroidism, hyperlipidemia, vitamin D deficiency, restless leg syndrome, dementia, hypertension, GERD, nonischemic cardiomyopathy, osteoporosis who presents today for follow-up regarding chronic low back pain now radiating into the left lower extremity. My review of an MRI lumbar spine shows extensive dextroconvex scoliosis with severe spinal stenosis L3-4 and moderate spinal stenosis L2-3 and L4-5.  There is also high-grade foraminal stenosis.  Patient status post an L5-S1 interlaminar epidural steroid injection on July 22, 2020 which provided at least 50% relief of her pain for approximately 9-10 months.  Over the past 3 months pain returned.  She is neurologically intact on exam.       Plan:     A shared decision making plan was used.  The patient's values and choices were respected.  The following represents what was discussed and decided upon by the physician assistant and the patient.  Patient is accompanied by her son who helps to provide history.    1.  DIAGNOSTIC TESTS: I reviewed the MRI lumbar spine.  No further diagnostic tests were ordered.  -If a repeat L5-S1 interlaminar epidural steroid injection fails to provide relief, I would recommend obtaining an updated MRI lumbar spine for further evaluation.    2.  PHYSICAL THERAPY: Patient participated in physical therapy at [facility] for athletic medicine in 2019.  She still does home exercises on a daily basis.  No further physical therapy was ordered.    3.  MEDICATIONS: No changes are made to the patient's medications.  She uses Tylenol 1000 milligrams 3-4 times daily, nabumetone 750 mg twice daily.  She also uses a turmeric supplement.      4.  INTERVENTIONS: I offered the patient a repeat L5-S1 interlaminar epidural steroid injection.  Patient indicated she would like to proceed and an order was placed.  -If axial low back pain were to worsen, we could  consider facet joint injections.  -Second Covid vaccine was in February or March 2021.    5.  PATIENT EDUCATION:    -Patient is in agreement the above plan.  All questions were answered.    6.  FOLLOW-UP: If patient has expected relief after her L5-S1 interlaminar epidural steroid injection she can follow-up with me as needed.  If she does not have expected relief after 2 to 3 weeks, she should call our clinic.  At that time I would order an MRI lumbar spine and she could follow-up with me afterwards to review the results.  If she has any other questions or concerns, she should not hesitate to call.    Subjective:     Karen Arthur is a 85 y.o. female who presents today for follow-up regarding chronic low back pain now radiating into the left lower extremity.  This patient was last seen at our clinic for an L5-S1 interlaminar epidural steroid injection July 22, 2020.  Patient reports that injection provided at least 50% relief of her pain for about 9-10 months.  Over the past 3 months, this patient's pain returned.  She denies any injury or event to cause the recurrent pain.    Patient complains of bilateral low back pain.  Pain spans across the low back at the belt line.  Pain radiates into the left lateral hip.  On bad days the pain radiates all the way down the lateral thigh into the lateral calf to the ankle.  She denies any significant right leg pain currently.  She rates her pain today is a 3 out of 10.  At its worst it is a 9 or 10.  At its best it is a 2 out of 10.  Patient has good days and bad days with her pain.  On bad days she cannot even get out of bed and has to use a wheelchair.  Her walking is limited by her pain, even on good days she has to take breaks with walking.  Pain is alleviated with lying down.  She denies any numbness, tingling, weakness down the legs.  Denies any loss of bowel or bladder control.  Denies any recent fevers.    Patient went to physical therapy at [facility] for athletic  medicine in [place] [address] for her lower back.  She does her home exercises on a daily basis.  She uses Tylenol 1000 mill grams 3-4 times daily, nabumetone 750 mg twice daily.  She also uses a turmeric supplement.  These medications are helpful.    Past medical history is reviewed and is unchanged.    Review of Systems:  Positive for stumbles.  Negative for numbness/tingling, weakness, loss of bowel/bladder control, inability to urinate, headache, pain much worse at night, difficulty swallowing, difficulty with hand skills, fevers, unintentional weight loss.     Objective:   CONSTITUTIONAL:  Vital signs as above.  No acute distress.  The patient is well nourished and well groomed.    PSYCHIATRIC:  The patient is awake, alert, oriented to person, place and time.  The patient is answering questions appropriately with clear speech.  Normal affect.  HEENT: Normocephalic, atraumatic.  Sclera clear.    SKIN:  Skin over the face, posterior torso, bilateral upper and lower extremities is clean, dry, intact without rashes.  MUSCULOSKELETAL:  Gait is guarded but not antalgic.  Patient has a significant scoliotic deformity.  The patient is able to rise onto toes and heels bilaterally with support.  No  tenderness over the bilateral lower lumbar paraspinal muscles.      The patient has 5/5 strength for the bilateral hip flexors, knee flexors/extensors, ankle dorsiflexors/plantar flexors, ankle evertors/invertors.    NEUROLOGICAL: 1-2+ bilateral patellar and Achilles reflexes.  Negative Babinski's bilaterally.  No ankle clonus.  Subjective diminished/altered sensation diffusely left lower leg.     RESULTS:  I reviewed the MRI lumbar spine from [facility] dated July 30, 2019.  This shows extensive dextroconvex scoliosis of the lumbar spine.  There is severe spinal stenosis at L3-4 with redundancy of the cauda equina nerve roots.  There is moderate spinal stenosis L2-3 and L4-5.  Patient has multilevel foraminal stenosis which is  most severe on the right at L5-S1.  There is also moderate to severe right foraminal stenosis L4-5.  Please see report for further details.

## 2021-08-16 ENCOUNTER — OFFICE VISIT (OUTPATIENT)
Dept: PHYSICAL MEDICINE AND REHAB | Facility: CLINIC | Age: 86
End: 2021-08-16
Payer: COMMERCIAL

## 2021-08-16 VITALS
HEIGHT: 63 IN | HEART RATE: 71 BPM | WEIGHT: 118.1 LBS | SYSTOLIC BLOOD PRESSURE: 125 MMHG | DIASTOLIC BLOOD PRESSURE: 61 MMHG | BODY MASS INDEX: 20.93 KG/M2

## 2021-08-16 DIAGNOSIS — M41.9 SCOLIOSIS OF THORACOLUMBAR SPINE, UNSPECIFIED SCOLIOSIS TYPE: Primary | ICD-10-CM

## 2021-08-16 DIAGNOSIS — M48.062 SPINAL STENOSIS OF LUMBAR REGION WITH NEUROGENIC CLAUDICATION: ICD-10-CM

## 2021-08-16 DIAGNOSIS — M54.16 LUMBAR RADICULAR PAIN: ICD-10-CM

## 2021-08-16 PROCEDURE — 99214 OFFICE O/P EST MOD 30 MIN: CPT | Performed by: PHYSICIAN ASSISTANT

## 2021-08-16 ASSESSMENT — PAIN SCALES - GENERAL: PAINLEVEL: MILD PAIN (3)

## 2021-08-16 ASSESSMENT — MIFFLIN-ST. JEOR: SCORE: 936.89

## 2021-08-16 NOTE — LETTER
8/16/2021         RE: Karen Arthur  [address]  [facility] [address]        Dear Colleague,    Thank you for referring your patient, Karen Arthur, to the [facility] [place]. Please see a copy of my visit note below.    Assessment:   Karen Arthur is a 85 y.o. y.o. female with past medical history significant for hypothyroidism, hyperlipidemia, vitamin D deficiency, restless leg syndrome, dementia, hypertension, GERD, nonischemic cardiomyopathy, osteoporosis who presents today for follow-up regarding chronic low back pain now radiating into the left lower extremity. My review of an MRI lumbar spine shows extensive dextroconvex scoliosis with severe spinal stenosis L3-4 and moderate spinal stenosis L2-3 and L4-5.  There is also high-grade foraminal stenosis.  Patient status post an L5-S1 interlaminar epidural steroid injection on July 22, 2020 which provided at least 50% relief of her pain for approximately 9-10 months.  Over the past 3 months pain returned.  She is neurologically intact on exam.       Plan:     A shared decision making plan was used.  The patient's values and choices were respected.  The following represents what was discussed and decided upon by the physician assistant and the patient.  Patient is accompanied by her son who helps to provide history.    1.  DIAGNOSTIC TESTS: I reviewed the MRI lumbar spine.  No further diagnostic tests were ordered.  -If a repeat L5-S1 interlaminar epidural steroid injection fails to provide relief, I would recommend obtaining an updated MRI lumbar spine for further evaluation.    2.  PHYSICAL THERAPY: Patient participated in physical therapy at [facility] for athletic medicine in 2019.  She still does home exercises on a daily basis.  No further physical therapy was ordered.    3.  MEDICATIONS: No changes are made to the patient's medications.  She uses Tylenol 1000 milligrams 3-4 times daily, nabumetone 750 mg twice daily.  She also  uses a turmeric supplement.      4.  INTERVENTIONS: I offered the patient a repeat L5-S1 interlaminar epidural steroid injection.  Patient indicated she would like to proceed and an order was placed.  -If axial low back pain were to worsen, we could consider facet joint injections.  -Second Covid vaccine was in February or March 2021.    5.  PATIENT EDUCATION:    -Patient is in agreement the above plan.  All questions were answered.    6.  FOLLOW-UP: If patient has expected relief after her L5-S1 interlaminar epidural steroid injection she can follow-up with me as needed.  If she does not have expected relief after 2 to 3 weeks, she should call our clinic.  At that time I would order an MRI lumbar spine and she could follow-up with me afterwards to review the results.  If she has any other questions or concerns, she should not hesitate to call.    Subjective:     Karen Arthur is a 85 y.o. female who presents today for follow-up regarding chronic low back pain now radiating into the left lower extremity.  This patient was last seen at our clinic for an L5-S1 interlaminar epidural steroid injection July 22, 2020.  Patient reports that injection provided at least 50% relief of her pain for about 9-10 months.  Over the past 3 months, this patient's pain returned.  She denies any injury or event to cause the recurrent pain.    Patient complains of bilateral low back pain.  Pain spans across the low back at the belt line.  Pain radiates into the left lateral hip.  On bad days the pain radiates all the way down the lateral thigh into the lateral calf to the ankle.  She denies any significant right leg pain currently.  She rates her pain today is a 3 out of 10.  At its worst it is a 9 or 10.  At its best it is a 2 out of 10.  Patient has good days and bad days with her pain.  On bad days she cannot even get out of bed and has to use a wheelchair.  Her walking is limited by her pain, even on good days she has to take  breaks with walking.  Pain is alleviated with lying down.  She denies any numbness, tingling, weakness down the legs.  Denies any loss of bowel or bladder control.  Denies any recent fevers.    Patient went to physical therapy at [facility] for athletic medicine in [place] 2019 for her lower back.  She does her home exercises on a daily basis.  She uses Tylenol 1000 mill grams 3-4 times daily, nabumetone 750 mg twice daily.  She also uses a turmeric supplement.  These medications are helpful.    Past medical history is reviewed and is unchanged.    Review of Systems:  Positive for stumbles.  Negative for numbness/tingling, weakness, loss of bowel/bladder control, inability to urinate, headache, pain much worse at night, difficulty swallowing, difficulty with hand skills, fevers, unintentional weight loss.     Objective:   CONSTITUTIONAL:  Vital signs as above.  No acute distress.  The patient is well nourished and well groomed.    PSYCHIATRIC:  The patient is awake, alert, oriented to person, place and time.  The patient is answering questions appropriately with clear speech.  Normal affect.  HEENT: Normocephalic, atraumatic.  Sclera clear.    SKIN:  Skin over the face, posterior torso, bilateral upper and lower extremities is clean, dry, intact without rashes.  MUSCULOSKELETAL:  Gait is guarded but not antalgic.  Patient has a significant scoliotic deformity.  The patient is able to rise onto toes and heels bilaterally with support.  No  tenderness over the bilateral lower lumbar paraspinal muscles.      The patient has 5/5 strength for the bilateral hip flexors, knee flexors/extensors, ankle dorsiflexors/plantar flexors, ankle evertors/invertors.    NEUROLOGICAL: 1-2+ bilateral patellar and Achilles reflexes.  Negative Babinski's bilaterally.  No ankle clonus.  Subjective diminished/altered sensation diffusely left lower leg.     RESULTS:  I reviewed the MRI lumbar spine from [facility] dated July 30, 2019.  This  shows extensive dextroconvex scoliosis of the lumbar spine.  There is severe spinal stenosis at L3-4 with redundancy of the cauda equina nerve roots.  There is moderate spinal stenosis L2-3 and L4-5.  Patient has multilevel foraminal stenosis which is most severe on the right at L5-S1.  There is also moderate to severe right foraminal stenosis L4-5.  Please see report for further details.            Again, thank you for allowing me to participate in the care of your patient.        Sincerely,        Ailin Colunga PA-C

## 2021-08-16 NOTE — PATIENT INSTRUCTIONS
Please schedule this injection at least 1 week  from now to allow time for insurance prior authorization.  On the day of your injection, you cannot be sick or taking antibiotics.  If you become sick and are prescribed, please call the clinic so your injection can be rescheduled for once you have completed your antibiotics.  You will need to bring a  with you for your injection.   If you have any questions or concerns prior to your injection, please do not hesitate to call the nurse navigation line at [phone] or contact Ailin Colunga through [employer].

## 2021-08-31 ENCOUNTER — ANCILLARY PROCEDURE (OUTPATIENT)
Dept: PHYSICAL MEDICINE AND REHAB | Facility: CLINIC | Age: 86
End: 2021-08-31
Attending: PHYSICIAN ASSISTANT
Payer: COMMERCIAL

## 2021-08-31 VITALS
RESPIRATION RATE: 16 BRPM | TEMPERATURE: 97.5 F | DIASTOLIC BLOOD PRESSURE: 70 MMHG | OXYGEN SATURATION: 99 % | HEART RATE: 62 BPM | SYSTOLIC BLOOD PRESSURE: 128 MMHG

## 2021-08-31 DIAGNOSIS — M48.062 SPINAL STENOSIS OF LUMBAR REGION WITH NEUROGENIC CLAUDICATION: ICD-10-CM

## 2021-08-31 PROCEDURE — 62323 NJX INTERLAMINAR LMBR/SAC: CPT | Performed by: PHYSICAL MEDICINE & REHABILITATION

## 2021-08-31 RX ORDER — LIDOCAINE HYDROCHLORIDE 10 MG/ML
INJECTION, SOLUTION EPIDURAL; INFILTRATION; INTRACAUDAL; PERINEURAL
Status: COMPLETED | OUTPATIENT
Start: 2021-08-31 | End: 2021-08-31

## 2021-08-31 RX ORDER — METHYLPREDNISOLONE ACETATE 80 MG/ML
INJECTION, SUSPENSION INTRA-ARTICULAR; INTRALESIONAL; INTRAMUSCULAR; SOFT TISSUE
Status: COMPLETED | OUTPATIENT
Start: 2021-08-31 | End: 2021-08-31

## 2021-08-31 RX ADMIN — LIDOCAINE HYDROCHLORIDE 2.5 ML: 10 INJECTION, SOLUTION EPIDURAL; INFILTRATION; INTRACAUDAL; PERINEURAL at 15:16

## 2021-08-31 RX ADMIN — METHYLPREDNISOLONE ACETATE 80 MG: 80 INJECTION, SUSPENSION INTRA-ARTICULAR; INTRALESIONAL; INTRAMUSCULAR; SOFT TISSUE at 15:19

## 2021-08-31 ASSESSMENT — PAIN SCALES - GENERAL
PAINLEVEL: NO PAIN (0)
PAINLEVEL: NO PAIN (0)

## 2021-08-31 NOTE — PATIENT INSTRUCTIONS
Follow-up visit with OMEGA Kong in 2 weeks if symptoms worsen or fail to improve.  Otherwise, please follow-up on an as-needed basis going forward.       DISCHARGE INSTRUCTIONS    During office hours (8:00 a.m.- 4:00 p.m.) questions or concerns may be answered  by calling [facility] Navigation Nurses at  [phone].  Messages received after hours will be returned the following business day.      In the case of an emergency, please dial 911 or seek assistance at the nearest Emergency Room/Urgent Care facility.     All Patients:    ? You may experience an increase in your symptoms for the first 2 days (It may take anywhere between 2 days- 2 weeks for the steroid to have maximum effect).    ? You may use ice on the injection site, as frequently as 20 minutes each hour if needed.    ? You may take your pain medicine.    ? You may continue taking your regular medication after your injection. If you have had a Medial Branch Block you may resume pain medication once your pain diary is completed.    ? You may shower. No swimming, tub bath or hot tub for 48 hours.  You may remove your bandaid/bandage as soon as you are home.    ? You may resume light activities, as tolerated.    ? Resume your usual diet as tolerated.    ? It is strongly advised that you do not drive for 1-3 hours post injection.    ? If you have had oral sedation:  Do not drive for 8 hours post injection.      ? If you have had IV sedation:  Do not drive for 24 hours post injection.  Do not operate hazardous machinery or make important personal/business decisions for 24 hours.      POSSIBLE STEROID SIDE EFFECTS (If steroid/cortisone was used for your procedure)    -If you experience these symptoms, it should only last for a short period      Swelling of the legs                Skin redness (flushing)       Mouth (oral) irritation     Blood sugar (glucose) levels              Sweats                      Mood  changes    Headache    Sleeplessness    Weakened immune system for up to 14 days, which could increase the risk of mable the COVID-19 virus and/or experiencing more severe symptoms of the disease, if exposed.    Decreased effectiveness of the flu vaccine if given within 2 weeks of the steroid.         POSSIBLE PROCEDURE SIDE EFFECTS  -Call the Spine Center if you are concerned    Increased Pain             Increased numbness/tingling        Nausea/Vomiting            Bruising/bleeding at site        Redness or swelling                                                Difficulty walking        Weakness             Fever greater than 100.5    *In the event of a severe headache after an epidural steroid injection that is relieved by lying down, please call the [facility] to speak with a clinical staff member*

## 2021-10-10 ENCOUNTER — HEALTH MAINTENANCE LETTER (OUTPATIENT)
Age: 86
End: 2021-10-10

## 2021-11-07 DIAGNOSIS — F03.90 DEMENTIA WITHOUT BEHAVIORAL DISTURBANCE, UNSPECIFIED DEMENTIA TYPE: ICD-10-CM

## 2021-11-09 RX ORDER — MEMANTINE HYDROCHLORIDE 10 MG/1
TABLET ORAL
Qty: 180 TABLET | Refills: 0 | Status: SHIPPED | OUTPATIENT
Start: 2021-11-09 | End: 2022-01-10

## 2021-11-09 NOTE — TELEPHONE ENCOUNTER
"Last Written Prescription Date:  4/13/21  Last Fill Quantity: 180,  # refills: 1   Last office visit provider:  2/2/21     Requested Prescriptions   Pending Prescriptions Disp Refills     memantine (NAMENDA) 10 MG tablet [Pharmacy Med Name: Memantine HCl Oral Tablet 10 MG] 180 tablet 0     Sig: TAKE 1 TABLET BY MOUTH TWICE DAILY.       Miscellaneous Dementia Agents Passed - 11/7/2021  4:55 PM        Passed - Recent (12 mo) or future (30 days) visit within the authorizing provider's specialty     Patient has had an office visit with the authorizing provider or a provider within the authorizing providers department within the previous 12 mos or has a future within next 30 days. See \"Patient Info\" tab in inbasket, or \"Choose Columns\" in Meds & Orders section of the refill encounter.              Passed - Medication is active on med list        Passed - Patient is 18 years of age or older             Zabrina Stephen RN 11/09/21 12:38 PM  "

## 2021-11-12 ENCOUNTER — OFFICE VISIT (OUTPATIENT)
Dept: PHYSICAL MEDICINE AND REHAB | Facility: CLINIC | Age: 86
End: 2021-11-12
Payer: COMMERCIAL

## 2021-11-12 VITALS
WEIGHT: 118 LBS | SYSTOLIC BLOOD PRESSURE: 138 MMHG | DIASTOLIC BLOOD PRESSURE: 73 MMHG | HEART RATE: 68 BPM | BODY MASS INDEX: 20.91 KG/M2 | HEIGHT: 63 IN

## 2021-11-12 DIAGNOSIS — M54.16 LUMBAR RADICULAR PAIN: ICD-10-CM

## 2021-11-12 DIAGNOSIS — M41.9 SCOLIOSIS OF THORACOLUMBAR SPINE, UNSPECIFIED SCOLIOSIS TYPE: ICD-10-CM

## 2021-11-12 DIAGNOSIS — M48.062 SPINAL STENOSIS OF LUMBAR REGION WITH NEUROGENIC CLAUDICATION: Primary | ICD-10-CM

## 2021-11-12 PROCEDURE — 99214 OFFICE O/P EST MOD 30 MIN: CPT | Performed by: PHYSICIAN ASSISTANT

## 2021-11-12 ASSESSMENT — MIFFLIN-ST. JEOR: SCORE: 936.43

## 2021-11-12 ASSESSMENT — PAIN SCALES - GENERAL: PAINLEVEL: MILD PAIN (3)

## 2021-11-12 NOTE — LETTER
11/12/2021         RE: Karen Arthur  [address]  [facility] [address]        Dear Colleague,    Thank you for referring your patient, Karen Arthur, to the [facility]. Please see a copy of my visit note below.    Assessment:   Karen Arthur is a 86 y.o. y.o. female with past medical history significant for hypothyroidism, hyperlipidemia, vitamin D deficiency, restless leg syndrome, dementia, hypertension, GERD, nonischemic cardiomyopathy, osteoporosis who presents today for follow-up regarding acute on chronic low back pain with radiation into the left greater than right lower extremity. Right lower extremity pain just began yesterday and yesterday she also had pain radiating up the left side of her back toward her shoulder. My review of an MRI lumbar spine from 2019 shows extensive dextroconvex scoliosis with severe spinal stenosis L3-4 and moderate spinal stenosis L2-3 and L4-5.  There is also high-grade foraminal stenosis.  Patient status post an L5-S1 interlaminar epidural steroid injection on August 31, 2021 which did not provide any relief of her pain.       Plan:     A shared decision making plan was used.  The patient's values and choices were respected.  The following represents what was discussed and decided upon by the physician assistant and the patient.  Patient is accompanied by her  and daughter who helped to provide history since she has dementia.    1.  DIAGNOSTIC TESTS: I reviewed the MRI lumbar spine. I ordered an updated MRI lumbar spine for further evaluation.      2.  PHYSICAL THERAPY: Patient participated in physical therapy at [place] for athletic medicine in 2019.  She still does home exercises on a daily basis.  No further physical therapy was ordered.    3.  MEDICATIONS: No changes are made to the patient's medications.  She uses Tylenol 1000 milligrams 3-4 times daily, nabumetone 750 mg twice daily.  She also uses a turmeric supplement.  She has tried topical pain relievers but they were not helpful.      4.  INTERVENTIONS: No injections were ordered. We will await the results of her updated MRI lumbar spine.      5.  PATIENT EDUCATION:    -Patient is in agreement the above plan.  All questions were answered.    6.  FOLLOW-UP: I will send the patient a [employer] message with her MRI results. At that time I will plan to schedule a follow-up visit with the patient to review the results and discuss treatment options. If she has questions or concerns in the meantime, she should not hesitate to call.    Subjective:     Karen Arthur is a 86 y.o. female who presents today for follow-up regarding chronic low back pain now radiating into the left lower extremity.  Patient is status post an L5-S1 interlaminar epidural steroid injection August 31, 2021.  Patient reports the injection did not provide any relief of her pain. 3 weeks ago left low back and left leg pain worsened. She denies any injury or event to cause the pain to worsen. Just yesterday she had much more severe pain on the left side and a new pain in the right lower extremity as well as pain radiating up her back toward her left shoulder. Denies any injury yesterday. Patient's  reports that upon getting up from using the toilet she was unable to take a step due to her severe pain. She had to use a wheelchair. By the end of the day pain had eased up somewhat. Pain is better today.    Patient complains of left greater than right low back pain. Pain radiates into the bilateral buttocks. Pain extends into the lateral hips. On the left side the pain wraps around into the groin. She also has pain that radiates down both the medial and lateral aspect of the leg down to the foot. She reports pain radiating up the left side of her back toward her left shoulder. Denies neck pain. Denies pain down the arms. She denies numbness or tingling in the arms or legs. She feels generally weak. She rates her  pain today as a 3 out of 10. At its worst it is a 10 out of 10. Pain is aggravated with increased activity and alleviated with lying down. Denies loss of bowel or bladder control. Denies any recent fevers.    Patient went to physical therapy at [facility] for athletic medicine in [place] 2019 for her lower back.  She does her home exercises on a daily basis.  She uses Tylenol 1000 mill grams 3-4 times daily, nabumetone 750 mg twice daily.  She also uses a turmeric supplement.  These medications are somewhat helpful.        Review of Systems:  Positive for weakness, difficulty with hand skills. Negative for numbness/tingling, loss of bowel/bladder control, inability to urinate, headache, pain much worse at night, trip/stumble/falls, difficulty swallowing, fevers, unintentional weight loss.     Objective:   CONSTITUTIONAL:  Vital signs as above.  No acute distress.  The patient is well nourished and well groomed.    PSYCHIATRIC:  The patient is awake, alert, oriented to person, place and time.  The patient is answering questions appropriately with clear speech.  Normal affect.  HEENT: Normocephalic, atraumatic.  Sclera clear.    SKIN:  Skin over the face, posterior torso, bilateral upper and lower extremities is clean, dry, intact without rashes.  MUSCULOSKELETAL:  Gait is guarded antalgic, favoring the left. Patient has a significant scoliotic deformity.  The patient is able to rise onto toes and heels bilaterally with support. Tender to palpation left lower lumbar paraspinous muscles. Tender to palpation left sacroiliac joint.     The patient has 5/5 strength for the bilateral hip flexors, knee flexors/extensors, ankle dorsiflexors/plantar flexors, ankle evertors/invertors. Internal and external rotation of the hips did not reproduce pain. Straight leg raise positive left, negative right.  NEUROLOGICAL: 1-2+ bilateral patellar and Achilles reflexes.  Negative Babinski's bilaterally.  No ankle clonus. Sensation  intact to light touch bilateral lower extremities throughout.     RESULTS:  I reviewed the MRI lumbar spine from [facility] dated July 30, 2019.  This shows extensive dextroconvex scoliosis of the lumbar spine.  There is severe spinal stenosis at L3-4 with redundancy of the cauda equina nerve roots.  There is moderate spinal stenosis L2-3 and L4-5.  Patient has multilevel foraminal stenosis which is most severe on the right at L5-S1.  There is also moderate to severe right foraminal stenosis L4-5.  Please see report for further details.            Again, thank you for allowing me to participate in the care of your patient.        Sincerely,        Ailin Colunga PA-C

## 2021-11-12 NOTE — PROGRESS NOTES
Assessment:   Karen Arthur is a 86 y.o. y.o. female with past medical history significant for hypothyroidism, hyperlipidemia, vitamin D deficiency, restless leg syndrome, dementia, hypertension, GERD, nonischemic cardiomyopathy, osteoporosis who presents today for follow-up regarding acute on chronic low back pain with radiation into the left greater than right lower extremity. Right lower extremity pain just began yesterday and yesterday she also had pain radiating up the left side of her back toward her shoulder. My review of an MRI lumbar spine from 2019 shows extensive dextroconvex scoliosis with severe spinal stenosis L3-4 and moderate spinal stenosis L2-3 and L4-5.  There is also high-grade foraminal stenosis.  Patient status post an L5-S1 interlaminar epidural steroid injection on August 31, 2021 which did not provide any relief of her pain.       Plan:     A shared decision making plan was used.  The patient's values and choices were respected.  The following represents what was discussed and decided upon by the physician assistant and the patient.  Patient is accompanied by her  and daughter who helped to provide history since she has dementia.    1.  DIAGNOSTIC TESTS: I reviewed the MRI lumbar spine. I ordered an updated MRI lumbar spine for further evaluation.      2.  PHYSICAL THERAPY: Patient participated in physical therapy at [place] for athletic medicine in 2019.  She still does home exercises on a daily basis.  No further physical therapy was ordered.    3.  MEDICATIONS: No changes are made to the patient's medications.  She uses Tylenol 1000 milligrams 3-4 times daily, nabumetone 750 mg twice daily.  She also uses a turmeric supplement. She has tried topical pain relievers but they were not helpful.      4.  INTERVENTIONS: No injections were ordered. We will await the results of her updated MRI lumbar spine.      5.  PATIENT EDUCATION:    -Patient is in agreement the above plan.  All  questions were answered.    6.  FOLLOW-UP: I will send the patient a [employer] message with her MRI results. At that time I will plan to schedule a follow-up visit with the patient to review the results and discuss treatment options. If she has questions or concerns in the meantime, she should not hesitate to call.    Subjective:     Karen Arthur is a 86 y.o. female who presents today for follow-up regarding chronic low back pain now radiating into the left lower extremity.  Patient is status post an L5-S1 interlaminar epidural steroid injection August 31, 2021.  Patient reports the injection did not provide any relief of her pain. 3 weeks ago left low back and left leg pain worsened. She denies any injury or event to cause the pain to worsen. Just yesterday she had much more severe pain on the left side and a new pain in the right lower extremity as well as pain radiating up her back toward her left shoulder. Denies any injury yesterday. Patient's  reports that upon getting up from using the toilet she was unable to take a step due to her severe pain. She had to use a wheelchair. By the end of the day pain had eased up somewhat. Pain is better today.    Patient complains of left greater than right low back pain. Pain radiates into the bilateral buttocks. Pain extends into the lateral hips. On the left side the pain wraps around into the groin. She also has pain that radiates down both the medial and lateral aspect of the leg down to the foot. She reports pain radiating up the left side of her back toward her left shoulder. Denies neck pain. Denies pain down the arms. She denies numbness or tingling in the arms or legs. She feels generally weak. She rates her pain today as a 3 out of 10. At its worst it is a 10 out of 10. Pain is aggravated with increased activity and alleviated with lying down. Denies loss of bowel or bladder control. Denies any recent fevers.    Patient went to physical therapy at [facility]  for athletic medicine in [place] 2019 for her lower back.  She does her home exercises on a daily basis.  She uses Tylenol 1000 mill grams 3-4 times daily, nabumetone 750 mg twice daily.  She also uses a turmeric supplement.  These medications are somewhat helpful.        Review of Systems:  Positive for weakness, difficulty with hand skills. Negative for numbness/tingling, loss of bowel/bladder control, inability to urinate, headache, pain much worse at night, trip/stumble/falls, difficulty swallowing, fevers, unintentional weight loss.     Objective:   CONSTITUTIONAL:  Vital signs as above.  No acute distress.  The patient is well nourished and well groomed.    PSYCHIATRIC:  The patient is awake, alert, oriented to person, place and time.  The patient is answering questions appropriately with clear speech.  Normal affect.  HEENT: Normocephalic, atraumatic.  Sclera clear.    SKIN:  Skin over the face, posterior torso, bilateral upper and lower extremities is clean, dry, intact without rashes.  MUSCULOSKELETAL:  Gait is guarded antalgic, favoring the left. Patient has a significant scoliotic deformity.  The patient is able to rise onto toes and heels bilaterally with support. Tender to palpation left lower lumbar paraspinous muscles. Tender to palpation left sacroiliac joint.     The patient has 5/5 strength for the bilateral hip flexors, knee flexors/extensors, ankle dorsiflexors/plantar flexors, ankle evertors/invertors. Internal and external rotation of the hips did not reproduce pain. Straight leg raise positive left, negative right.  NEUROLOGICAL: 1-2+ bilateral patellar and Achilles reflexes.  Negative Babinski's bilaterally.  No ankle clonus. Sensation intact to light touch bilateral lower extremities throughout.     RESULTS:  I reviewed the MRI lumbar spine from [facility] dated July 30, 2019.  This shows extensive dextroconvex scoliosis of the lumbar spine.  There is severe spinal stenosis at L3-4 with  redundancy of the cauda equina nerve roots.  There is moderate spinal stenosis L2-3 and L4-5.  Patient has multilevel foraminal stenosis which is most severe on the right at L5-S1.  There is also moderate to severe right foraminal stenosis L4-5.  Please see report for further details.

## 2021-11-12 NOTE — PATIENT INSTRUCTIONS
[facility] Scheduling    Please call [phone] to schedule your image(s) (select option#1).     [facility]  [address] [address]      [address] [address]

## 2021-11-22 ENCOUNTER — HOSPITAL ENCOUNTER (OUTPATIENT)
Dept: MRI IMAGING | Facility: HOSPITAL | Age: 86
Discharge: HOME | End: 2021-11-22
Attending: PHYSICIAN ASSISTANT | Admitting: PHYSICIAN ASSISTANT
Payer: COMMERCIAL

## 2021-11-22 DIAGNOSIS — M54.16 LUMBAR RADICULAR PAIN: ICD-10-CM

## 2021-11-22 DIAGNOSIS — M48.062 SPINAL STENOSIS OF LUMBAR REGION WITH NEUROGENIC CLAUDICATION: ICD-10-CM

## 2021-11-22 DIAGNOSIS — M41.9 SCOLIOSIS OF THORACOLUMBAR SPINE, UNSPECIFIED SCOLIOSIS TYPE: ICD-10-CM

## 2021-11-22 PROCEDURE — 72148 MRI LUMBAR SPINE W/O DYE: CPT

## 2021-11-29 NOTE — PROGRESS NOTES
Assessment:   Karen Arthur is a 86 y.o. y.o. female with past medical history significant for hypothyroidism, hyperlipidemia, vitamin D deficiency, restless leg syndrome, dementia, hypertension, GERD, nonischemic cardiomyopathy, osteoporosis who presents today for follow-up regarding chronic low back pain with radiation into the bilateral lower extremities.  Pain is typically worse on the left than the right but today right is worse than the left.  My review of an MRI lumbar spine shows significant dextroconvex scoliosis with multilevel high-grade spinal canal and neural foraminal stenosis. Patient status post an L5-S1 interlaminar epidural steroid injection on August 31, 2021 which did not provide any relief of her pain.       Plan:     A shared decision making plan was used.  The patient's values and choices were respected.  The following represents what was discussed and decided upon by the physician assistant and the patient.  Patient is accompanied by her  and son who helped to provide history since she has dementia.    1.  DIAGNOSTIC TESTS: I reviewed the MRI lumbar spine in detail with the patient with the help of a spine model.  No further diagnostic tests were ordered.      2.  PHYSICAL THERAPY: Patient participated in physical therapy at [place] for athletic medicine in 2019.  She still does home exercises on a daily basis.  No further physical therapy was ordered.    3.  MEDICATIONS: No changes are made to the patient's medications.  She uses Tylenol 1000 milligrams 3-4 times daily, nabumetone 750 mg twice daily.  She also uses a turmeric supplement. She has tried topical pain relievers but they were not helpful.  -We briefly discussed trying additional pain relieving medications such as gabapentin but she declined at this point due to concern for side effects.      4.  INTERVENTIONS:   -I offer the patient bilateral L3-4 transforaminal epidural steroid injections as the next step.  I chose  this injection next because that is the level where she has the most severe spinal stenosis.  This is also the level but has had the most progression in terms of her degenerative changes compared with her prior MRI scan.  Patient indicated she would like to proceed and an order was placed.  -If this does not help, we could also try a transforaminal epidural steroid injections at L4-5 or L5-S1.  -If left lower rib pain persist we could also try a left intercostal nerve block.  Patient's scoliotic curvature causes her left lower rib to come to contact with the top of her left pelvis which is causing rib pain.  -If epidural steroid injections fail to provide relief we could also try medial branch blocks to determine if there is facet mediated pain.      5.  PATIENT EDUCATION:    -Patient is in agreement the above plan.  All questions were answered.    6.  FOLLOW-UP: Patient will follow up with me 2 weeks after her bilateral L3-4 transforaminal epidural steroid injections.  If she has questions or concerns in the meantime, she should not hesitate to call.    Subjective:     Karen Arthur is a 86 y.o. female who presents today for follow-up regarding chronic low back pain with radiation into the bilateral lower extremities.  Pain is typically worse on the left than the right but today the right side is more painful.  Her  had to help her get off the toilet this morning due to right-sided pain.  I saw the patient most recently November 12, 2021.  At that time she reported no improvement in her pain following an L5-S1 interlaminar epidural steroid injection performed August 31, 2021.  Patient feels that her pain has gotten worse since I last saw her.    Patient complains of bilateral low back pain.  On the left side she has pain along the left lower ribs.  The pain radiates into the buttock.  She points to the front of the thigh when asked to point to where the pain radiates down the leg.  Her  states she  has also complained of some pain in the left groin.  On the right side the pain is located in the right lower lumbar region and extends into the buttock.  Her  says that it seems like the pain goes down the lateral thigh into the lateral calf.  She denies any numbness or tingling.  She feels generally weak.  She rates her pain today as an 8 of 10.  At its worst it is a 9 out of 10.  At its best it is a 1 out of 10.  She denies aggravating factors to the pain.  Sitting helps alleviate the pain.  She denies any new symptoms since she was last seen.    Patient went to physical therapy at [place] for athletic medicine in [place] 2019 for her lower back.  She does her home exercises on a daily basis.  She uses Tylenol 1000 mill grams 3-4 times daily, nabumetone 750 mg twice daily.  She also uses a turmeric supplement.  These medications are somewhat helpful.        Review of Systems:  Positive for weakness. Negative for numbness/tingling, loss of bowel/bladder control, inability to urinate, headache, pain much worse at night, trip/stumble/falls, difficulty swallowing, fevers, unintentional weight loss, difficulty with hand skills.     Objective:   CONSTITUTIONAL:  Vital signs as above.  No acute distress.  The patient is well nourished and well groomed.    PSYCHIATRIC:  The patient is awake, alert, oriented to person, place and time.  The patient is answering questions appropriately with clear speech.  Normal affect.  HEENT: Normocephalic, atraumatic.  Sclera clear.    SKIN:  Skin over the face, posterior torso, bilateral upper and lower extremities is clean, dry, intact without rashes.  MUSCULOSKELETAL: Patient transitions from seated to standing independently.  Patient has a significant scoliotic deformity.   Tender to palpation left lower lumbar paraspinous muscles. Tender to palpation bilateral sacroiliac joint.     The patient has 5/5 strength for the bilateral hip flexors, knee flexors/extensors, ankle  dorsiflexors.   NEUROLOGICAL: 1-2+ bilateral patellar reflexes.   Sensation intact to light touch bilateral lower extremities throughout.     RESULTS:  I reviewed the MRI lumbar spine from [facility] dated November 22, 2021.  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  There is multilevel lumbar spondylosis.  At T12-L1 there is severe left foraminal stenosis.  At L1-L2 there is moderate left foraminal stenosis.  At L2-3 there is moderate spinal canal stenosis with mild to moderate right and moderate to severe left foraminal stenosis.  At L3-4 there is moderate to severe spinal canal stenosis with moderate bilateral foraminal stenosis.  At L4-5 there is mild spinal canal stenosis with moderate to severe right and mild left foraminal stenosis.  At L5-S1 there is moderate to severe right foraminal stenosis.  Please see report for further details.

## 2021-11-30 ENCOUNTER — OFFICE VISIT (OUTPATIENT)
Dept: PHYSICAL MEDICINE AND REHAB | Facility: CLINIC | Age: 86
End: 2021-11-30
Payer: COMMERCIAL

## 2021-11-30 VITALS
HEART RATE: 68 BPM | DIASTOLIC BLOOD PRESSURE: 56 MMHG | SYSTOLIC BLOOD PRESSURE: 118 MMHG | BODY MASS INDEX: 20.91 KG/M2 | WEIGHT: 118 LBS | TEMPERATURE: 97.5 F | HEIGHT: 63 IN

## 2021-11-30 DIAGNOSIS — M48.062 SPINAL STENOSIS OF LUMBAR REGION WITH NEUROGENIC CLAUDICATION: ICD-10-CM

## 2021-11-30 DIAGNOSIS — M41.9 SCOLIOSIS OF THORACOLUMBAR SPINE, UNSPECIFIED SCOLIOSIS TYPE: Primary | ICD-10-CM

## 2021-11-30 DIAGNOSIS — M54.16 LUMBAR RADICULAR PAIN: ICD-10-CM

## 2021-11-30 PROCEDURE — 99214 OFFICE O/P EST MOD 30 MIN: CPT | Performed by: PHYSICIAN ASSISTANT

## 2021-11-30 ASSESSMENT — PAIN SCALES - GENERAL: PAINLEVEL: EXTREME PAIN (8)

## 2021-11-30 ASSESSMENT — MIFFLIN-ST. JEOR: SCORE: 936.43

## 2021-11-30 NOTE — PATIENT INSTRUCTIONS
Bilateral L3/4 epidural steroid injection has been ordered today.      Please note that this injection uses cortisone.  The cortisone may somewhat weaken the immune system.  It is unknown how much the immune system is weakened.  It is unknown if it is weakened to the point that you may be more likely to get the COVID-19 virus, or if you do get the COVID-19 virus, if you would be sicker than you would have been if you had not had the cortisone injection.  If you do not wish to proceed with the injection, please let the nurse/physician know and do NOT schedule the injection.    Please note that since your immune system is weakened from the cortisone, having a flu vaccine/shot may be less effective if you have this vaccine within 2 weeks from your cortisone injection.  It is advised to wait 2 weeks after your cortisone injection to have the flu shot (or if you have the flu shot first, wait 2 weeks before you have the cortisone injection).    Please schedule this injection at least 1 week  from now to allow time for insurance prior authorization.  On the day of your injection, you cannot be sick or taking antibiotics.  If you become sick and are prescribed, please call the clinic so your injection can be rescheduled for once you have completed your antibiotics.  You will need to bring a  with you for your injection.   If you have any questions or concerns prior to your injection, please do not hesitate to call the nurse navigation line at [phone] or contact Ailin Colunga through [employer].

## 2021-11-30 NOTE — LETTER
11/30/2021         RE: Karen Arthur  [address]  [facility] [address]        Dear Colleague,    Thank you for referring your patient, Karen Arthur, to the [facility] [place]. Please see a copy of my visit note below.    Assessment:   Karen Arthur is a 86 y.o. y.o. female with past medical history significant for hypothyroidism, hyperlipidemia, vitamin D deficiency, restless leg syndrome, dementia, hypertension, GERD, nonischemic cardiomyopathy, osteoporosis who presents today for follow-up regarding chronic low back pain with radiation into the bilateral lower extremities.  Pain is typically worse on the left than the right but today right is worse than the left.  My review of an MRI lumbar spine shows significant dextroconvex scoliosis with multilevel high-grade spinal canal and neural foraminal stenosis. Patient status post an L5-S1 interlaminar epidural steroid injection on August 31, 2021 which did not provide any relief of her pain.       Plan:     A shared decision making plan was used.  The patient's values and choices were respected.  The following represents what was discussed and decided upon by the physician assistant and the patient.  Patient is accompanied by her  and son who helped to provide history since she has dementia.    1.  DIAGNOSTIC TESTS: I reviewed the MRI lumbar spine in detail with the patient with the help of a spine model.  No further diagnostic tests were ordered.      2.  PHYSICAL THERAPY: Patient participated in physical therapy at [facility] in 2019.  She still does home exercises on a daily basis.  No further physical therapy was ordered.    3.  MEDICATIONS: No changes are made to the patient's medications.  She uses Tylenol 1000 milligrams 3-4 times daily, nabumetone 750 mg twice daily.  She also uses a turmeric supplement. She has tried topical pain relievers but they were not helpful.  -We briefly discussed trying  additional pain relieving medications such as gabapentin but she declined at this point due to concern for side effects.      4.  INTERVENTIONS:   -I offer the patient bilateral L3-4 transforaminal epidural steroid injections as the next step.  I chose this injection next because that is the level where she has the most severe spinal stenosis.  This is also the level but has had the most progression in terms of her degenerative changes compared with her prior MRI scan.  Patient indicated she would like to proceed and an order was placed.  -If this does not help, we could also try a transforaminal epidural steroid injections at L4-5 or L5-S1.  -If left lower rib pain persist we could also try a left intercostal nerve block.  Patient's scoliotic curvature causes her left lower rib to come to contact with the top of her left pelvis which is causing rib pain.  -If epidural steroid injections fail to provide relief we could also try medial branch blocks to determine if there is facet mediated pain.      5.  PATIENT EDUCATION:    -Patient is in agreement the above plan.  All questions were answered.    6.  FOLLOW-UP: Patient will follow up with me 2 weeks after her bilateral L3-4 transforaminal epidural steroid injections.  If she has questions or concerns in the meantime, she should not hesitate to call.    Subjective:     Karen Arthur is a 86 y.o. female who presents today for follow-up regarding chronic low back pain with radiation into the bilateral lower extremities.  Pain is typically worse on the left than the right but today the right side is more painful.  Her  had to help her get off the toilet this morning due to right-sided pain.  I saw the patient most recently November 12, 2021.  At that time she reported no improvement in her pain following an L5-S1 interlaminar epidural steroid injection performed August 31, 2021.  Patient feels that her pain has gotten worse since I last saw her.    Patient  complains of bilateral low back pain.  On the left side she has pain along the left lower ribs.  The pain radiates into the buttock.  She points to the front of the thigh when asked to point to where the pain radiates down the leg.  Her  states she has also complained of some pain in the left groin.  On the right side the pain is located in the right lower lumbar region and extends into the buttock.  Her  says that it seems like the pain goes down the lateral thigh into the lateral calf.  She denies any numbness or tingling.  She feels generally weak.  She rates her pain today as an 8 of 10.  At its worst it is a 9 out of 10.  At its best it is a 1 out of 10.  She denies aggravating factors to the pain.  Sitting helps alleviate the pain.  She denies any new symptoms since she was last seen.    Patient went to physical therapy at [facility] in [place] 2019 for her lower back.  She does her home exercises on a daily basis.  She uses Tylenol 1000 mill grams 3-4 times daily, nabumetone 750 mg twice daily.  She also uses a turmeric supplement.  These medications are somewhat helpful.        Review of Systems:  Positive for weakness. Negative for numbness/tingling, loss of bowel/bladder control, inability to urinate, headache, pain much worse at night, trip/stumble/falls, difficulty swallowing, fevers, unintentional weight loss, difficulty with hand skills.     Objective:   CONSTITUTIONAL:  Vital signs as above.  No acute distress.  The patient is well nourished and well groomed.    PSYCHIATRIC:  The patient is awake, alert, oriented to person, place and time.  The patient is answering questions appropriately with clear speech.  Normal affect.  HEENT: Normocephalic, atraumatic.  Sclera clear.    SKIN:  Skin over the face, posterior torso, bilateral upper and lower extremities is clean, dry, intact without rashes.  MUSCULOSKELETAL: Patient transitions from seated to standing  independently.  Patient has a significant scoliotic deformity.   Tender to palpation left lower lumbar paraspinous muscles. Tender to palpation bilateral sacroiliac joint.     The patient has 5/5 strength for the bilateral hip flexors, knee flexors/extensors, ankle dorsiflexors.   NEUROLOGICAL: 1-2+ bilateral patellar reflexes.   Sensation intact to light touch bilateral lower extremities throughout.     RESULTS:  I reviewed the MRI lumbar spine from [facility] dated November 22, 2021.  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  There is multilevel lumbar spondylosis.  At T12-L1 there is severe left foraminal stenosis.  At L1-L2 there is moderate left foraminal stenosis.  At L2-3 there is moderate spinal canal stenosis with mild to moderate right and moderate to severe left foraminal stenosis.  At L3-4 there is moderate to severe spinal canal stenosis with moderate bilateral foraminal stenosis.  At L4-5 there is mild spinal canal stenosis with moderate to severe right and mild left foraminal stenosis.  At L5-S1 there is moderate to severe right foraminal stenosis.  Please see report for further details.            Again, thank you for allowing me to participate in the care of your patient.        Sincerely,        Ailin Colunga PA-C

## 2021-12-08 ENCOUNTER — ANCILLARY PROCEDURE (OUTPATIENT)
Dept: PHYSICAL MEDICINE AND REHAB | Facility: CLINIC | Age: 86
End: 2021-12-08
Attending: PHYSICIAN ASSISTANT
Payer: COMMERCIAL

## 2021-12-08 VITALS
TEMPERATURE: 97.5 F | DIASTOLIC BLOOD PRESSURE: 72 MMHG | SYSTOLIC BLOOD PRESSURE: 122 MMHG | HEART RATE: 58 BPM | OXYGEN SATURATION: 96 %

## 2021-12-08 DIAGNOSIS — M41.9 SCOLIOSIS OF THORACOLUMBAR SPINE, UNSPECIFIED SCOLIOSIS TYPE: ICD-10-CM

## 2021-12-08 DIAGNOSIS — M54.16 LUMBAR RADICULAR PAIN: ICD-10-CM

## 2021-12-08 DIAGNOSIS — M48.062 SPINAL STENOSIS OF LUMBAR REGION WITH NEUROGENIC CLAUDICATION: ICD-10-CM

## 2021-12-08 PROCEDURE — 64483 NJX AA&/STRD TFRM EPI L/S 1: CPT | Mod: 50 | Performed by: PHYSICAL MEDICINE & REHABILITATION

## 2021-12-08 RX ORDER — METHYLPREDNISOLONE ACETATE 80 MG/ML
INJECTION, SUSPENSION INTRA-ARTICULAR; INTRALESIONAL; INTRAMUSCULAR; SOFT TISSUE
Status: COMPLETED | OUTPATIENT
Start: 2021-12-08 | End: 2021-12-08

## 2021-12-08 RX ORDER — LIDOCAINE HYDROCHLORIDE 10 MG/ML
INJECTION, SOLUTION EPIDURAL; INFILTRATION; INTRACAUDAL; PERINEURAL
Status: COMPLETED | OUTPATIENT
Start: 2021-12-08 | End: 2021-12-08

## 2021-12-08 RX ADMIN — LIDOCAINE HYDROCHLORIDE 5 ML: 10 INJECTION, SOLUTION EPIDURAL; INFILTRATION; INTRACAUDAL; PERINEURAL at 11:20

## 2021-12-08 RX ADMIN — METHYLPREDNISOLONE ACETATE 80 MG: 80 INJECTION, SUSPENSION INTRA-ARTICULAR; INTRALESIONAL; INTRAMUSCULAR; SOFT TISSUE at 11:20

## 2021-12-08 ASSESSMENT — PAIN SCALES - GENERAL
PAINLEVEL: MODERATE PAIN (5)
PAINLEVEL: NO PAIN (0)

## 2021-12-08 NOTE — PATIENT INSTRUCTIONS
Please follow up two weeks post procedure with Ailin to evaluate your plan of care.       DISCHARGE INSTRUCTIONS    During office hours (8:00 a.m.- 4:00 p.m.) questions or concerns may be answered  by calling Spine Center Navigation Nurses at  [phone].  Messages received after hours will be returned the following business day.      In the case of an emergency, please dial 911 or seek assistance at the nearest Emergency Room/Urgent Care facility.     All Patients:    ? You may experience an increase in your symptoms for the first 2 days (It may take anywhere between 2 days- 2 weeks for the steroid to have maximum effect).    ? You may use ice on the injection site, as frequently as 20 minutes each hour if needed.    ? You may take your pain medicine.    ? You may continue taking your regular medication after your injection. If you have had a Medial Branch Block you may resume pain medication once your pain diary is completed.    ? You may shower. No swimming, tub bath or hot tub for 48 hours.  You may remove your bandaid/bandage as soon as you are home.    ? You may resume light activities, as tolerated.    ? Resume your usual diet as tolerated.    ? It is strongly advised that you do not drive for 1-3 hours post injection.    ? If you have had oral sedation:  Do not drive for 8 hours post injection.      ? If you have had IV sedation:  Do not drive for 24 hours post injection.  Do not operate hazardous machinery or make important personal/business decisions for 24 hours.      POSSIBLE STEROID SIDE EFFECTS (If steroid/cortisone was used for your procedure)    -If you experience these symptoms, it should only last for a short period      Swelling of the legs                Skin redness (flushing)       Mouth (oral) irritation     Blood sugar (glucose) levels              Sweats                      Mood changes    Headache    Sleeplessness    Weakened immune system for up to 14 days, which could increase the risk  of mable the COVID-19 virus and/or experiencing more severe symptoms of the disease, if exposed.    Decreased effectiveness of the flu vaccine if given within 2 weeks of the steroid.         POSSIBLE PROCEDURE SIDE EFFECTS  -Call the Spine Center if you are concerned    Increased Pain             Increased numbness/tingling        Nausea/Vomiting            Bruising/bleeding at site        Redness or swelling                                                Difficulty walking        Weakness             Fever greater than 100.5    *In the event of a severe headache after an epidural steroid injection that is relieved by lying down, please call the [facility] to speak with a clinical staff member*

## 2021-12-08 NOTE — Clinical Note
12/8/2021         RE: Karen Arthur  [address]  [facility] [address]        Dear Colleague,    Thank you for referring your patient, Karen Arthur, to the [facility] [place]. Please see a copy of my visit note below.    No notes on file    Again, thank you for allowing me to participate in the care of your patient.        Sincerely,        Ginny Mcclendon, DO

## 2021-12-30 NOTE — PROGRESS NOTES
Assessment:   Karen Arthur (Aka Karen Roper) is a 86 year old y.o. female with past medical history significant for hypothyroidism, hyperlipidemia, vitamin D deficiency, restless leg syndrome, dementia, hypertension, GERD, nonischemic cardiomyopathy, osteoporosis who presents today for follow-up regarding chronic low back pain with radiation into the bilateral lower extremities thought to be radicular/sciatic type leg symptoms.  Her MRI shows multilevel high-grade central canal stenosis and neuroforaminal stenosis.  She is recently status post bilateral L3-4 transforaminal epidural steroid injections on December 8 of 2021.  At this time, she is noticing 80% relief of her leg pain but no relief whatsoever of her low back pain.  Her low back pain may be from lumbar facet joint syndrome secondary to lumbar facet arthropathy/spondylosis (without myelopathy) seen on her MRI.  Sacroiliac joint pain is also in the differential diagnosis.  She is without any red flag symptoms.    PSP:  Ailin Colunga (scheduled in error with Dr. Puente today)       Plan:     A shared decision making plan was used.  The patient's values and choices were respected.  The following represents what was discussed and decided upon by the physician and the patient.  She is accompanied by her  Diallo and her daughter Aranza.    1.  DIAGNOSTIC TESTS: The patient's MRI of the lumbar spine from November 22, 2021 was personally reviewed today by the physician with the physician performing her own interpretation.  There is a thoracolumbar dextrocurvature measuring 45 degrees from T12-L4.  There is multilevel lumbar spondylosis.  At T12-L1 there is severe left foraminal stenosis.  At the L1-2 level, there is moderate left foraminal stenosis.  There is moderate central canal stenosis at the L2-3 level with left greater than right foraminal stenosis.  At the L3-4 level there is moderate to severe central canal stenosis with moderate bilateral L3-4  foraminal stenosis.  At the L4-5 level there is mild central canal stenosis with moderate to severe right and mild left foraminal stenosis.  At the L5-S1 level there is moderate to severe right L5-S1 foraminal stenosis.  The images were discussed with the patient.  -The patient's DEXA scan report from 2017 was reviewed by the physician and is summarized as follows: Significant for osteopenia.  -No further diagnostic testing necessary at this time.  2.  PHYSICAL THERAPY: The patient last did physical therapy through [facility] in 2019.  She is diligent about doing her home exercise program on a regular basis.  However, her exercises are mainly stretching and range of motion base.  She would benefit from strengthening exercises.  I did refer her to the spine center for traditional physical therapy, specifically to work on glutes and hamstring strengthening that I think she is missing from her home exercise program.  She and her family were in agreement with this location she is encouraged to do these over the long-term.  3.  MEDICATIONS:    -I will defer to Ailin for any medication changes.  At this point they are asking for any other medication that she could use when she has breakthrough excruciating pain.  I do think it would be reasonable for her to have an opiate pain medication on hand for these episodes.  However it would be best for this type of medication to be prescribed either through her primary care physician or potentially through Ailin if her primary care physician is not comfortable doing this  -At this time she can continue with Tylenol as needed.  She should not exceed 4000 mg in a 24-hour period.  -She is taking nabumetone 750 mg twice a day as needed for pain.  She is using sparingly secondary to concern for kidney compromise.  -She also takes a turmeric supplement.  -She has previously declined gabapentin due to a concern for potential side effects.  She would like to try this  in the future can be prescribed for her  -She has tried topical pain relievers but they did not provide any relief.  4.  INTERVENTIONS:    -Given that she just mainly has back pain at this time, I did order bilateral L3, 4, L5 medial branch blocks to determine if her back pain is coming from the L4-5 and L5-S1 facet joints.  If she fails to have relief with these, I would recommend bilateral sacroiliac joint injections.  -She is status post bilateral L3-4 transforaminal epidural steroid injections on December 8 of 2021.  -She did not have any relief with an L5-S1 interlaminar epidural steroid injection on August 31 of 2021.  5.  PATIENT EDUCATION:    -I did explain that the back pain may be coming from the lumbar facet joints or the sacroiliac joints.  The use a spine model to show the difference between these 2 structures.  -The daughter asked if there is anything that can be done to slow the progression of the scoliosis and/or the degenerative changes.  Explained that the scoliosis progression will mainly be affected by any osteopenia or osteoporosis.  I explained that she is due for another bone density check.  She should reach out to her primary care physician about getting this scheduled.  She does have osteopenia or osteoporosis, would be very important to treat these conditions  -I did explain that other conditions that can accelerate degeneration include smoking, eating a diet high in processed foods or sugar.  She does not smoke and she eats a fairly healthy diet.  I explained that the only other controllable factor is to maintain her strength is much as possible to help protect and support her spine.  -I discussed at length the medial branch blocks and what they are targeting and that there specifically to help treat the back pain.  -All of her and her family's questions were answered to their satisfaction today.  They were in agreement the treatment plan  6.  FOLLOW-UP: She can follow-up in 1 week for the  bilateral L3, 4, L5 medial branch blocks.  However future follow-up should be with Ailin (she was scheduled in error with Dr. Puente today).  If there are any questions/concerns or any significant worsening of pain prior to that time, the patient is asked to call the clinic via the nurse navigation line or via [facility].     A total of 45 minutes was spent in the care of this patient on the date of service.    Subjective:     Karen Arthur is a 86 year old female who presents today for follow-up regarding chronic bilateral low back pain with bilateral radicular leg symptoms.  She is status post bilateral L3-4 transforaminal epidural steroid injections on December 8 of 2021.  At this time, she is reporting 80% relief of her leg pain, but no relief of her back pain.  She has pain going across her low back.  She says that it alternates between the right and the left side, but they are equally bothersome to her.  She describes the pain as sharp and achy.  Today she is rating it at a 2 out of 10.  At worst it can be an 8 out of 10.  Her daughter states that it sometimes she is completely incapacitated and the pain is absolutely excruciating.  Other times though the pain is very mild, and at best it can be a 1 out of 10.  Does not seem like anything specific other than standing will trigger the pain.  Walking can aggravate it, but they state that in general is just that every day activities that seem to bother her.  The only thing that consistently gives her relief is to lay down flat.  Sitting down will sometimes relieve the pain but other times does not provide relief.  She does not have any problems sleeping at this time.  She denies any new symptoms since she was last seen by Ailin Colunga.  She did do physical therapy in 2019 and she reports that he does her exercises every morning.  Her family is very adamant that she is very diligent in doing these on a regular basis.  She is taking Tylenol on a regular basis,  essentially 500 mg 6 times per day.  She also takes nabumetone twice a day and has  tumeric that she takes as well.  They are not sure how much relief she gets from the medications but they are afraid to stop them.    Past medical history is reviewed and is unchanged for any new medical diagnoses in the interim.      Family history is reviewed and is unchanged in the interim.      Review of Systems:  Positive for weakness in the legs.  Pertinent negatives include no numbness, tingling, headaches, fevers, chills, unexplained weight loss, bowel incontinence, bladder incontinence, trips, stumbles, falls.  All others reviewed and are negative.     Objective:   CONSTITUTIONAL:  Vital signs as above.  No acute distress.  The patient is well nourished and well groomed.    PSYCHIATRIC:  The patient is awake, alert, oriented to person, place and time.  The patient is answering questions appropriately with clear speech.  Normal affect.  SKIN:  Skin over the face, posterior torso, bilateral upper and lower extremities is clean, dry, intact without rashes.  MUSCULOSKELETAL: Gait is slow but nonantalgic.  She is able to raise up on her heels and her toes with hand-held assist.  Mild tenderness over the lower lumbar paraspinal muscles with significant tenderness over the SI joints.      The patient has 5/5 strength for the bilateral hip flexors, knee flexors/extensors, ankle dorsiflexors/plantar flexors, ankle evertors/invertors.  She does require some prompting to maintain a full contraction, but she can maintain full contraction against resistance.  Negative straight leg exam test bilaterally.  The patient does not have any pain with hip range of motion testing, with the hip in a flexed position testing internal/external rotation.  Fabere's test is negative bilaterally.  Gaenslen's test is negative bilaterally.  She does not have any significant pain with lumbar flexion, lumbar extension, lumbar sidebending, lumbar rotation.   She does note some discomfort anteriorly with lumbar sidebending, but no pain in the low back.  NEUROLOGICAL: Trace patellar, medial hamstring, achilles reflexes which are symmetric bilaterally.  No ankle clonus bilaterally.  Sensation to light touch is intact in the bilateral L4, L5, and S1 dermatomes.

## 2021-12-31 ENCOUNTER — OFFICE VISIT (OUTPATIENT)
Dept: PHYSICAL MEDICINE AND REHAB | Facility: CLINIC | Age: 86
End: 2021-12-31
Payer: COMMERCIAL

## 2021-12-31 VITALS — SYSTOLIC BLOOD PRESSURE: 116 MMHG | DIASTOLIC BLOOD PRESSURE: 56 MMHG | HEART RATE: 66 BPM

## 2021-12-31 DIAGNOSIS — M47.816 LUMBAR FACET ARTHROPATHY: ICD-10-CM

## 2021-12-31 DIAGNOSIS — M54.42 CHRONIC BILATERAL LOW BACK PAIN WITH BILATERAL SCIATICA: Primary | ICD-10-CM

## 2021-12-31 DIAGNOSIS — M53.3 SACROILIAC JOINT PAIN: ICD-10-CM

## 2021-12-31 DIAGNOSIS — M48.062 SPINAL STENOSIS OF LUMBAR REGION WITH NEUROGENIC CLAUDICATION: ICD-10-CM

## 2021-12-31 DIAGNOSIS — M54.41 CHRONIC BILATERAL LOW BACK PAIN WITH BILATERAL SCIATICA: Primary | ICD-10-CM

## 2021-12-31 DIAGNOSIS — G89.29 CHRONIC BILATERAL LOW BACK PAIN WITH BILATERAL SCIATICA: Primary | ICD-10-CM

## 2021-12-31 DIAGNOSIS — M47.817 LUMBOSACRAL SPONDYLOSIS WITHOUT MYELOPATHY: ICD-10-CM

## 2021-12-31 DIAGNOSIS — M47.816 LUMBAR FACET JOINT SYNDROME: ICD-10-CM

## 2021-12-31 PROCEDURE — 99215 OFFICE O/P EST HI 40 MIN: CPT | Performed by: PHYSICAL MEDICINE & REHABILITATION

## 2021-12-31 ASSESSMENT — PAIN SCALES - GENERAL: PAINLEVEL: MILD PAIN (2)

## 2021-12-31 NOTE — LETTER
12/31/2021         RE: Karen Arthur  [address]  [facility] [address]        Dear Colleague,    Thank you for referring your patient, Karen Arthur, to the [facility] [place]. Please see a copy of my visit note below.    Assessment:   Karen Arthur (Aka Karen Roper) is a 86 year old y.o. female with past medical history significant for hypothyroidism, hyperlipidemia, vitamin D deficiency, restless leg syndrome, dementia, hypertension, GERD, nonischemic cardiomyopathy, osteoporosis who presents today for follow-up regarding chronic low back pain with radiation into the bilateral lower extremities thought to be radicular/sciatic type leg symptoms.  Her MRI shows multilevel high-grade central canal stenosis and neuroforaminal stenosis.  She is recently status post bilateral L3-4 transforaminal epidural steroid injections on December 8 of 2021.  At this time, she is noticing 80% relief of her leg pain but no relief whatsoever of her low back pain.  Her low back pain may be from lumbar facet joint syndrome secondary to lumbar facet arthropathy/spondylosis (without myelopathy) seen on her MRI.  Sacroiliac joint pain is also in the differential diagnosis.  She is without any red flag symptoms.    PSP:  Ailin Colunga (scheduled in error with Dr. Puente today)       Plan:     A shared decision making plan was used.  The patient's values and choices were respected.  The following represents what was discussed and decided upon by the physician and the patient.  She is accompanied by her  Diallo and her daughter Aranza.    1.  DIAGNOSTIC TESTS: The patient's MRI of the lumbar spine from November 22, 2021 was personally reviewed today by the physician with the physician performing her own interpretation.  There is a thoracolumbar dextrocurvature measuring 45 degrees from T12-L4.  There is multilevel lumbar spondylosis.  At T12-L1 there is severe left foraminal stenosis.  At the L1-2  level, there is moderate left foraminal stenosis.  There is moderate central canal stenosis at the L2-3 level with left greater than right foraminal stenosis.  At the L3-4 level there is moderate to severe central canal stenosis with moderate bilateral L3-4 foraminal stenosis.  At the L4-5 level there is mild central canal stenosis with moderate to severe right and mild left foraminal stenosis.  At the L5-S1 level there is moderate to severe right L5-S1 foraminal stenosis.  The images were discussed with the patient.  -The patient's DEXA scan report from 2017 was reviewed by the physician and is summarized as follows: Significant for osteopenia.  -No further diagnostic testing necessary at this time.  2.  PHYSICAL THERAPY: The patient last did physical therapy through [facility] in 2019.  She is diligent about doing her home exercise program on a regular basis.  However, her exercises are mainly stretching and range of motion base.  She would benefit from strengthening exercises.  I did refer her to the spine center for traditional physical therapy, specifically to work on glutes and hamstring strengthening that I think she is missing from her home exercise program.  She and her family were in agreement with this location she is encouraged to do these over the long-term.  3.  MEDICATIONS:    -I will defer to Ailin for any medication changes.  At this point they are asking for any other medication that she could use when she has breakthrough excruciating pain.  I do think it would be reasonable for her to have an opiate pain medication on hand for these episodes.  However it would be best for this type of medication to be prescribed either through her primary care physician or potentially through Ailin if her primary care physician is not comfortable doing this  -At this time she can continue with Tylenol as needed.  She should not exceed 4000 mg in a 24-hour period.  -She is taking nabumetone 750  mg twice a day as needed for pain.  She is using sparingly secondary to concern for kidney compromise.  -She also takes a turmeric supplement.  -She has previously declined gabapentin due to a concern for potential side effects.  She would like to try this in the future can be prescribed for her  -She has tried topical pain relievers but they did not provide any relief.  4.  INTERVENTIONS:    -Given that she just mainly has back pain at this time, I did order bilateral L3, 4, L5 medial branch blocks to determine if her back pain is coming from the L4-5 and L5-S1 facet joints.  If she fails to have relief with these, I would recommend bilateral sacroiliac joint injections.  -She is status post bilateral L3-4 transforaminal epidural steroid injections on December 8 of 2021.  -She did not have any relief with an L5-S1 interlaminar epidural steroid injection on August 31 of 2021.  5.  PATIENT EDUCATION:    -I did explain that the back pain may be coming from the lumbar facet joints or the sacroiliac joints.  The use a spine model to show the difference between these 2 structures.  -The daughter asked if there is anything that can be done to slow the progression of the scoliosis and/or the degenerative changes.  Explained that the scoliosis progression will mainly be affected by any osteopenia or osteoporosis.  I explained that she is due for another bone density check.  She should reach out to her primary care physician about getting this scheduled.  She does have osteopenia or osteoporosis, would be very important to treat these conditions  -I did explain that other conditions that can accelerate degeneration include smoking, eating a diet high in processed foods or sugar.  She does not smoke and she eats a fairly healthy diet.  I explained that the only other controllable factor is to maintain her strength is much as possible to help protect and support her spine.  -I discussed at length the medial branch blocks and  what they are targeting and that there specifically to help treat the back pain.  -All of her and her family's questions were answered to their satisfaction today.  They were in agreement the treatment plan  6.  FOLLOW-UP: She can follow-up in 1 week for the bilateral L3, 4, L5 medial branch blocks.  However future follow-up should be with Ailin (she was scheduled in error with Dr. Puente today).  If there are any questions/concerns or any significant worsening of pain prior to that time, the patient is asked to call the clinic via the nurse navigation line or via [employer].     A total of 45 minutes was spent in the care of this patient on the date of service.    Subjective:     Karen Arthur is a 86 year old female who presents today for follow-up regarding chronic bilateral low back pain with bilateral radicular leg symptoms.  She is status post bilateral L3-4 transforaminal epidural steroid injections on December 8 of 2021.  At this time, she is reporting 80% relief of her leg pain, but no relief of her back pain.  She has pain going across her low back.  She says that it alternates between the right and the left side, but they are equally bothersome to her.  She describes the pain as sharp and achy.  Today she is rating it at a 2 out of 10.  At worst it can be an 8 out of 10.  Her daughter states that it sometimes she is completely incapacitated and the pain is absolutely excruciating.  Other times though the pain is very mild, and at best it can be a 1 out of 10.  Does not seem like anything specific other than standing will trigger the pain.  Walking can aggravate it, but they state that in general is just that every day activities that seem to bother her.  The only thing that consistently gives her relief is to lay down flat.  Sitting down will sometimes relieve the pain but other times does not provide relief.  She does not have any problems sleeping at this time.  She denies any new symptoms since she was  last seen by Ailin Colunga.  She did do physical therapy in 2019 and she reports that he does her exercises every morning.  Her family is very adamant that she is very diligent in doing these on a regular basis.  She is taking Tylenol on a regular basis, essentially 500 mg 6 times per day.  She also takes nabumetone twice a day and has  tumeric that she takes as well.  They are not sure how much relief she gets from the medications but they are afraid to stop them.    Past medical history is reviewed and is unchanged for any new medical diagnoses in the interim.      Family history is reviewed and is unchanged in the interim.      Review of Systems:  Positive for weakness in the legs.  Pertinent negatives include no numbness, tingling, headaches, fevers, chills, unexplained weight loss, bowel incontinence, bladder incontinence, trips, stumbles, falls.  All others reviewed and are negative.     Objective:   CONSTITUTIONAL:  Vital signs as above.  No acute distress.  The patient is well nourished and well groomed.    PSYCHIATRIC:  The patient is awake, alert, oriented to person, place and time.  The patient is answering questions appropriately with clear speech.  Normal affect.  SKIN:  Skin over the face, posterior torso, bilateral upper and lower extremities is clean, dry, intact without rashes.  MUSCULOSKELETAL: Gait is slow but nonantalgic.  She is able to raise up on her heels and her toes with hand-held assist.  Mild tenderness over the lower lumbar paraspinal muscles with significant tenderness over the SI joints.      The patient has 5/5 strength for the bilateral hip flexors, knee flexors/extensors, ankle dorsiflexors/plantar flexors, ankle evertors/invertors.  She does require some prompting to maintain a full contraction, but she can maintain full contraction against resistance.  Negative straight leg exam test bilaterally.  The patient does not have any pain with hip range of motion testing, with the hip in a  flexed position testing internal/external rotation.  Fabere's test is negative bilaterally.  Gaenslen's test is negative bilaterally.  She does not have any significant pain with lumbar flexion, lumbar extension, lumbar sidebending, lumbar rotation.  She does note some discomfort anteriorly with lumbar sidebending, but no pain in the low back.  NEUROLOGICAL: Trace patellar, medial hamstring, achilles reflexes which are symmetric bilaterally.  No ankle clonus bilaterally.  Sensation to light touch is intact in the bilateral L4, L5, and S1 dermatomes.               Again, thank you for allowing me to participate in the care of your patient.        Sincerely,        Ginny Mcclendon, DO

## 2021-12-31 NOTE — PATIENT INSTRUCTIONS
Karen Roper    An order for physical therapy has been provided today for the spine center.  Someone will call you to schedule physical therapy or you can call [phone] to schedule physical therapy.  It will be very important for you to do your physical therapy exercises on a regular basis to decrease your pain and prevent future flares of pain.    Please contact your primary care physician's office to get an updated bone density scan to check and see if you still have osteopenia or if it has progressed osteoporosis.  Will be very important to treat either the osteopenia or the osteoporosis.    Bilateral L3, L4, L5 medial branch diagnostic injections have been ordered today.      Please schedule this injection at least 1 week  from now to allow time for insurance prior authorization.  On the day of your injection, you cannot be sick or taking antibiotics.  If you become sick and are prescribed, please call the clinic so your injection can be rescheduled for once you have completed your antibiotics.  You will need to bring a  with you for your injection.   If you have any questions or concerns prior to your injection, please do not hesitate to call the nurse navigation line at [phone] or contact Dr. Puente through [employer].    Future follow-up should be done with Ailin Colunga.  If Dr. Parker will not prescribe an opiate pain medication for breakthrough excruciating pain, you can discuss this with Ailin.    Thank you!  Dr. Puente

## 2022-01-08 DIAGNOSIS — F03.90 DEMENTIA WITHOUT BEHAVIORAL DISTURBANCE, UNSPECIFIED DEMENTIA TYPE: ICD-10-CM

## 2022-01-10 RX ORDER — MEMANTINE HYDROCHLORIDE 10 MG/1
TABLET ORAL
Qty: 180 TABLET | Refills: 0 | Status: SHIPPED | OUTPATIENT
Start: 2022-01-10 | End: 2022-03-28

## 2022-01-10 NOTE — TELEPHONE ENCOUNTER
Last OV 2/2/21  Next OV 2/7/22  memantine (NAMENDA) 10 MG tablet  0 ordered        Edit         Summary: TAKE 1 TABLET BY MOUTH TWICE DAILY., Disp-180 tablet, R-0, E-Prescribe     Start: 11/9/2021    Ord/Sold: 11/9/2021 (O)      Report    Adh:     Taking:     Long-term:       Pharmacy: [id number] - [place] [id number]    Med Dose History         Patient Sig: TAKE 1 TABLET BY MOUTH TWICE DAILY.       Ordered on: 11/9/2021       Authorized by: CEE PEREA       Dispense: 180 tablet

## 2022-01-19 ENCOUNTER — RADIOLOGY INJECTION OFFICE VISIT (OUTPATIENT)
Dept: PHYSICAL MEDICINE AND REHAB | Facility: CLINIC | Age: 87
End: 2022-01-19
Attending: PHYSICAL MEDICINE & REHABILITATION
Payer: COMMERCIAL

## 2022-01-19 VITALS
RESPIRATION RATE: 16 BRPM | DIASTOLIC BLOOD PRESSURE: 60 MMHG | OXYGEN SATURATION: 96 % | SYSTOLIC BLOOD PRESSURE: 116 MMHG | HEART RATE: 64 BPM

## 2022-01-19 DIAGNOSIS — M54.41 CHRONIC BILATERAL LOW BACK PAIN WITH BILATERAL SCIATICA: ICD-10-CM

## 2022-01-19 DIAGNOSIS — M47.816 LUMBAR FACET ARTHROPATHY: ICD-10-CM

## 2022-01-19 DIAGNOSIS — G89.29 CHRONIC BILATERAL LOW BACK PAIN WITH BILATERAL SCIATICA: ICD-10-CM

## 2022-01-19 DIAGNOSIS — M54.42 CHRONIC BILATERAL LOW BACK PAIN WITH BILATERAL SCIATICA: ICD-10-CM

## 2022-01-19 DIAGNOSIS — M47.817 LUMBOSACRAL SPONDYLOSIS WITHOUT MYELOPATHY: ICD-10-CM

## 2022-01-19 DIAGNOSIS — M47.816 LUMBAR FACET JOINT SYNDROME: ICD-10-CM

## 2022-01-19 PROCEDURE — 64493 INJ PARAVERT F JNT L/S 1 LEV: CPT | Mod: 50 | Performed by: PHYSICAL MEDICINE & REHABILITATION

## 2022-01-19 PROCEDURE — 64494 INJ PARAVERT F JNT L/S 2 LEV: CPT | Mod: 50 | Performed by: PHYSICAL MEDICINE & REHABILITATION

## 2022-01-19 RX ORDER — BUPIVACAINE HYDROCHLORIDE 7.5 MG/ML
INJECTION, SOLUTION EPIDURAL; RETROBULBAR
Status: COMPLETED | OUTPATIENT
Start: 2022-01-19 | End: 2022-01-19

## 2022-01-19 RX ADMIN — BUPIVACAINE HYDROCHLORIDE 3 ML: 7.5 INJECTION, SOLUTION EPIDURAL; RETROBULBAR at 10:29

## 2022-01-19 ASSESSMENT — PAIN SCALES - GENERAL
PAINLEVEL: MILD PAIN (2)
PAINLEVEL: SEVERE PAIN (6)

## 2022-01-19 NOTE — PATIENT INSTRUCTIONS
Please complete your pain diary and return the diary to the Spine Center at your earliest convenience.  The Spine Center will contact you to schedule your next appointment after your pain diary is reviewed by your doctor.  Thank you.    DISCHARGE INSTRUCTIONS    During office hours (8:00 a.m.- 4:00 p.m.) questions or concerns may be answered  by calling Spine Center Navigation Nurses at  [phone].  Messages received after hours will be returned the following business day.      In the case of an emergency, please dial 911 or seek assistance at the nearest Emergency Room/Urgent Care facility.     All Patients:  ? You may experience an increase in your symptoms for the first 2 days, once the numbing medication wears off.    ? You may resume your regular medication, no pain medication until you have completed your diary    ? You may shower. No swimming, tub bath or hot tub for 24 hours; remove bandage after 4 hours    ? Continue your activities that can cause you pain to test the blocks.                         ? You should not drive for the next 3 to 5 hours (have someone drive you)           POSSIBLE PROCEDURE SIDE EFFECTS  -Call Spine Center if concerned-    Increased Pain  Increased numbness/tingling     Nausea/Vomiting  Bruising/bleeding at site (hematoma)             Swelling at site (edema) Headache  Difficulty walking  Infection        Fever greater than 100.5

## 2022-01-28 ENCOUNTER — TELEPHONE (OUTPATIENT)
Dept: PHYSICAL MEDICINE AND REHAB | Facility: CLINIC | Age: 87
End: 2022-01-28
Payer: COMMERCIAL

## 2022-01-28 DIAGNOSIS — M47.816 SPONDYLOSIS OF LUMBAR REGION WITHOUT MYELOPATHY OR RADICULOPATHY: Primary | ICD-10-CM

## 2022-01-28 NOTE — TELEPHONE ENCOUNTER
Patient contacted and informed that OME[facility] Kong was recommending that she proceed with confirmatory bilateral L3, L4,L5 medial branch blocks given her favorable response to the initial MBBs that were recently completed by Dr. Puente at the Spine Center.  The patient was in agreement and wished to proceed with the procedure.    Procedure requirements were reviewed with the patient including the need for the patient's procedure to be at least a 5/10 the day of the procedure.  She verbalized understanding and was transferred to a member of the Spine Center scheduling team to assist with scheduling her appointment.

## 2022-02-02 ENCOUNTER — RADIOLOGY INJECTION OFFICE VISIT (OUTPATIENT)
Dept: PHYSICAL MEDICINE AND REHAB | Facility: CLINIC | Age: 87
End: 2022-02-02
Attending: PHYSICIAN ASSISTANT
Payer: COMMERCIAL

## 2022-02-02 VITALS
SYSTOLIC BLOOD PRESSURE: 124 MMHG | DIASTOLIC BLOOD PRESSURE: 60 MMHG | HEART RATE: 68 BPM | RESPIRATION RATE: 16 BRPM | OXYGEN SATURATION: 98 %

## 2022-02-02 DIAGNOSIS — M47.816 SPONDYLOSIS OF LUMBAR REGION WITHOUT MYELOPATHY OR RADICULOPATHY: ICD-10-CM

## 2022-02-02 PROCEDURE — 64494 INJ PARAVERT F JNT L/S 2 LEV: CPT | Mod: 50 | Performed by: PHYSICAL MEDICINE & REHABILITATION

## 2022-02-02 PROCEDURE — 64493 INJ PARAVERT F JNT L/S 1 LEV: CPT | Mod: 50 | Performed by: PHYSICAL MEDICINE & REHABILITATION

## 2022-02-02 RX ORDER — LIDOCAINE HYDROCHLORIDE 20 MG/ML
INJECTION, SOLUTION EPIDURAL; INFILTRATION; INTRACAUDAL; PERINEURAL
Status: COMPLETED | OUTPATIENT
Start: 2022-02-02 | End: 2022-02-02

## 2022-02-02 RX ADMIN — LIDOCAINE HYDROCHLORIDE 3 ML: 20 INJECTION, SOLUTION EPIDURAL; INFILTRATION; INTRACAUDAL; PERINEURAL at 10:14

## 2022-02-02 ASSESSMENT — PAIN SCALES - GENERAL
PAINLEVEL: MODERATE PAIN (5)
PAINLEVEL: NO PAIN (1)
PAINLEVEL: SEVERE PAIN (7)

## 2022-02-02 NOTE — PATIENT INSTRUCTIONS
Please complete your pain diary and return the diary to the Spine Center at your earliest convenience.  The Spine Center will contact you to schedule your next appointment after your pain diary is reviewed by your doctor.  Thank you.    DISCHARGE INSTRUCTIONS    During office hours (8:00 a.m.- 4:00 p.m.) questions or concerns may be answered  by calling Spine Center Navigation Nurses at  [phone].  Messages received after hours will be returned the following business day.      In the case of an emergency, please dial 911 or seek assistance at the nearest Emergency Room/Urgent Care facility.     All Patients:  ? You may experience an increase in your symptoms for the first 2 days, once the numbing medication wears off.    ? You may resume your regular medication, no pain medication until you have completed your diary    ? You may shower. No swimming, tub bath or hot tub for 24 hours; remove bandage after 4 hours    ? Continue your activities that can cause you pain to test the blocks.                         ? You should not drive for the next 3 to 5 hours (have someone drive you)           POSSIBLE PROCEDURE SIDE EFFECTS  -Call Spine Center if concerned-    Increased Pain  Increased numbness/tingling     Nausea/Vomiting  Bruising/bleeding at site (hematoma)             Swelling at site (edema) Headache  Difficulty walking  Infection        Fever greater than 100.5

## 2022-02-07 ENCOUNTER — OFFICE VISIT (OUTPATIENT)
Dept: INTERNAL MEDICINE | Facility: CLINIC | Age: 87
End: 2022-02-07
Payer: COMMERCIAL

## 2022-02-07 VITALS
BODY MASS INDEX: 21.81 KG/M2 | OXYGEN SATURATION: 93 % | DIASTOLIC BLOOD PRESSURE: 66 MMHG | HEIGHT: 63 IN | HEART RATE: 60 BPM | WEIGHT: 123.1 LBS | SYSTOLIC BLOOD PRESSURE: 138 MMHG

## 2022-02-07 DIAGNOSIS — M79.604 LOW BACK PAIN RADIATING TO BOTH LEGS: ICD-10-CM

## 2022-02-07 DIAGNOSIS — I25.10 ASCVD (ARTERIOSCLEROTIC CARDIOVASCULAR DISEASE): ICD-10-CM

## 2022-02-07 DIAGNOSIS — I10 ESSENTIAL HYPERTENSION: ICD-10-CM

## 2022-02-07 DIAGNOSIS — F51.04 CHRONIC INSOMNIA: ICD-10-CM

## 2022-02-07 DIAGNOSIS — E55.9 VITAMIN D DEFICIENCY: Chronic | ICD-10-CM

## 2022-02-07 DIAGNOSIS — I47.29 NSVT (NONSUSTAINED VENTRICULAR TACHYCARDIA) (H): ICD-10-CM

## 2022-02-07 DIAGNOSIS — Z00.00 ENCOUNTER FOR MEDICARE ANNUAL WELLNESS EXAM: Primary | ICD-10-CM

## 2022-02-07 DIAGNOSIS — Z86.73 HISTORY OF CVA (CEREBROVASCULAR ACCIDENT): ICD-10-CM

## 2022-02-07 DIAGNOSIS — Z85.828 HISTORY OF SKIN CANCER: ICD-10-CM

## 2022-02-07 DIAGNOSIS — M81.0 AGE-RELATED OSTEOPOROSIS WITHOUT CURRENT PATHOLOGICAL FRACTURE: ICD-10-CM

## 2022-02-07 DIAGNOSIS — M54.50 LOW BACK PAIN RADIATING TO BOTH LEGS: ICD-10-CM

## 2022-02-07 DIAGNOSIS — E78.5 HYPERLIPIDEMIA, UNSPECIFIED HYPERLIPIDEMIA TYPE: Chronic | ICD-10-CM

## 2022-02-07 DIAGNOSIS — K21.9 GASTROESOPHAGEAL REFLUX DISEASE WITHOUT ESOPHAGITIS: ICD-10-CM

## 2022-02-07 DIAGNOSIS — G30.1 LATE ONSET ALZHEIMER'S DEMENTIA WITHOUT BEHAVIORAL DISTURBANCE (H): ICD-10-CM

## 2022-02-07 DIAGNOSIS — E03.9 HYPOTHYROIDISM, UNSPECIFIED TYPE: Chronic | ICD-10-CM

## 2022-02-07 DIAGNOSIS — I42.8 CARDIOMYOPATHY, NONISCHEMIC (H): ICD-10-CM

## 2022-02-07 DIAGNOSIS — F02.80 LATE ONSET ALZHEIMER'S DEMENTIA WITHOUT BEHAVIORAL DISTURBANCE (H): ICD-10-CM

## 2022-02-07 DIAGNOSIS — M79.605 LOW BACK PAIN RADIATING TO BOTH LEGS: ICD-10-CM

## 2022-02-07 LAB
ALBUMIN SERPL-MCNC: 3.4 G/DL (ref 3.5–5)
ALBUMIN UR-MCNC: NEGATIVE MG/DL
ALP SERPL-CCNC: 76 U/L (ref 45–120)
ALT SERPL W P-5'-P-CCNC: 15 U/L (ref 0–45)
ANION GAP SERPL CALCULATED.3IONS-SCNC: 12 MMOL/L (ref 5–18)
APPEARANCE UR: CLEAR
AST SERPL W P-5'-P-CCNC: 20 U/L (ref 0–40)
BACTERIA #/AREA URNS HPF: ABNORMAL /HPF
BILIRUB SERPL-MCNC: 0.3 MG/DL (ref 0–1)
BILIRUB UR QL STRIP: NEGATIVE
BUN SERPL-MCNC: 35 MG/DL (ref 8–28)
CALCIUM SERPL-MCNC: 9.4 MG/DL (ref 8.5–10.5)
CHLORIDE BLD-SCNC: 103 MMOL/L (ref 98–107)
CHOLEST SERPL-MCNC: 191 MG/DL
CO2 SERPL-SCNC: 25 MMOL/L (ref 22–31)
COLOR UR AUTO: YELLOW
CREAT SERPL-MCNC: 1.35 MG/DL (ref 0.6–1.1)
ERYTHROCYTE [DISTWIDTH] IN BLOOD BY AUTOMATED COUNT: 12.4 % (ref 10–15)
FASTING STATUS PATIENT QL REPORTED: NORMAL
GFR SERPL CREATININE-BSD FRML MDRD: 38 ML/MIN/1.73M2
GLUCOSE BLD-MCNC: 80 MG/DL (ref 70–125)
GLUCOSE UR STRIP-MCNC: NEGATIVE MG/DL
HCT VFR BLD AUTO: 35.8 % (ref 35–47)
HDLC SERPL-MCNC: 68 MG/DL
HGB BLD-MCNC: 11.6 G/DL (ref 11.7–15.7)
HGB UR QL STRIP: NEGATIVE
HYALINE CASTS #/AREA URNS LPF: ABNORMAL /LPF
KETONES UR STRIP-MCNC: ABNORMAL MG/DL
LDLC SERPL CALC-MCNC: 105 MG/DL
LEUKOCYTE ESTERASE UR QL STRIP: ABNORMAL
MCH RBC QN AUTO: 33.1 PG (ref 26.5–33)
MCHC RBC AUTO-ENTMCNC: 32.4 G/DL (ref 31.5–36.5)
MCV RBC AUTO: 102 FL (ref 78–100)
NITRATE UR QL: NEGATIVE
PH UR STRIP: 5 [PH] (ref 5–8)
PLATELET # BLD AUTO: 233 10E3/UL (ref 150–450)
POTASSIUM BLD-SCNC: 4.7 MMOL/L (ref 3.5–5)
PROT SERPL-MCNC: 6.5 G/DL (ref 6–8)
RBC # BLD AUTO: 3.5 10E6/UL (ref 3.8–5.2)
RBC #/AREA URNS AUTO: ABNORMAL /HPF
SODIUM SERPL-SCNC: 140 MMOL/L (ref 136–145)
SP GR UR STRIP: 1.02 (ref 1–1.03)
SQUAMOUS #/AREA URNS AUTO: ABNORMAL /LPF
TRIGL SERPL-MCNC: 92 MG/DL
TSH SERPL DL<=0.005 MIU/L-ACNC: 3.66 UIU/ML (ref 0.3–5)
UROBILINOGEN UR STRIP-ACNC: 0.2 E.U./DL
WBC # BLD AUTO: 7.8 10E3/UL (ref 4–11)
WBC #/AREA URNS AUTO: ABNORMAL /HPF

## 2022-02-07 PROCEDURE — 36415 COLL VENOUS BLD VENIPUNCTURE: CPT | Performed by: INTERNAL MEDICINE

## 2022-02-07 PROCEDURE — 81001 URINALYSIS AUTO W/SCOPE: CPT | Performed by: INTERNAL MEDICINE

## 2022-02-07 PROCEDURE — 80053 COMPREHEN METABOLIC PANEL: CPT | Performed by: INTERNAL MEDICINE

## 2022-02-07 PROCEDURE — 84443 ASSAY THYROID STIM HORMONE: CPT | Performed by: INTERNAL MEDICINE

## 2022-02-07 PROCEDURE — 82306 VITAMIN D 25 HYDROXY: CPT | Performed by: INTERNAL MEDICINE

## 2022-02-07 PROCEDURE — 99214 OFFICE O/P EST MOD 30 MIN: CPT | Mod: 25 | Performed by: INTERNAL MEDICINE

## 2022-02-07 PROCEDURE — 99397 PER PM REEVAL EST PAT 65+ YR: CPT | Performed by: INTERNAL MEDICINE

## 2022-02-07 PROCEDURE — 80061 LIPID PANEL: CPT | Performed by: INTERNAL MEDICINE

## 2022-02-07 PROCEDURE — 85027 COMPLETE CBC AUTOMATED: CPT | Performed by: INTERNAL MEDICINE

## 2022-02-07 ASSESSMENT — ACTIVITIES OF DAILY LIVING (ADL)
CURRENT_FUNCTION: TRANSPORTATION REQUIRES ASSISTANCE
CURRENT_FUNCTION: HOUSEWORK REQUIRES ASSISTANCE
CURRENT_FUNCTION: PREPARING MEALS REQUIRES ASSISTANCE
CURRENT_FUNCTION: SHOPPING REQUIRES ASSISTANCE
CURRENT_FUNCTION: MEDICATION ADMINISTRATION REQUIRES ASSISTANCE
CURRENT_FUNCTION: MONEY MANAGEMENT REQUIRES ASSISTANCE
CURRENT_FUNCTION: LAUNDRY REQUIRES ASSISTANCE

## 2022-02-07 ASSESSMENT — MIFFLIN-ST. JEOR: SCORE: 959.57

## 2022-02-07 NOTE — PATIENT INSTRUCTIONS
Annual flu shot every fall    You will be due to get your tetanus as needed before July 2022.  You get a Td vaccine at your pharmacy.    You should schedule appointment with dermatology to have lesions on your left hand and back evaluated.  You can call dermatology consultants at [phone]    DEXA will be scheduled for osteoporosis follow-up    You should see your eye doctor every year    Tramadol 50 mg 1 to 2 tablets every 8 hours as needed would be recommended if you need increased pain control following rhizotomy    I would recommend cutting back or discontinuing all alcohol      Patient Education   Personalized Prevention Plan  You are due for the preventive services outlined below.  Your care team is available to assist you in scheduling these services.  If you have already completed any of these items, please share that information with your care team to update in your medical record.  There are no preventive care reminders to display for this patient.  Your Health Risk Assessment indicates you feel you are not in good health    A healthy lifestyle helps keep the body fit and the mind alert. It helps protect you from disease, helps you fight disease, and helps prevent chronic disease (disease that doesn't go away) from getting worse. This is important as you get older and begin to notice twinges in muscles and joints and a decline in the strength and stamina you once took for granted. A healthy lifestyle includes good healthcare, good nutrition, weight control, recreation, and regular exercise. Avoid harmful substances and do what you can to keep safe. Another part of a healthy lifestyle is stay mentally active and socially involved.    Good healthcare     Have a wellness visit every year.     If you have new symptoms, let us know right away. Don't wait until the next checkup.     Take medicines exactly as prescribed and keep your medicines in a safe place. Tell us if your medicine causes problems.    Healthy diet and weight control     Eat 3 or 4 small, nutritious, low-fat, high-fiber meals a day. Include a variety of fruits, vegetables, and whole-grain foods.     Make sure you get enough calcium in your diet. Calcium, vitamin D, and exercise help prevent osteoporosis (bone thinning).     If you live alone, try eating with others when you can. That way you get a good meal and have company while you eat it.     Try to keep a healthy weight. If you eat more calories than your body uses for energy, it will be stored as fat and you will gain weight.     Recreation   Recreation is not limited to sports and team events. It includes any activity that provides relaxation, interest, enjoyment, and exercise. Recreation provides an outlet for physical, mental, and social energy. It can give a sense of worth and achievement. It can help you stay healthy.    Mental Exercise and Social Involvement  Mental and emotional health is as important as physical health. Keep in touch with friends and family. Stay as active as possible. Continue to learn and challenge yourself.   Things you can do to stay mentally active are:    Learn something new, like a foreign language or musical instrument.     Play SCRABBLE or do crossword puzzles. If you cannot find people to play these games with you at home, you can play them with others on your computer through the Internet.     Join a games club--anything from card games to chess or checkers or lawn bowling.     Start a new hobby.     Go back to school.     Volunteer.     Read.   Keep up with world events.    Understanding USDA MyPlate  The USDA has guidelines to help you make healthy food choices. These are called MyPlate. MyPlate shows the food groups that make up healthy meals using the image of a place setting. Before you eat, think about the healthiest choices for what to put on your plate or in your cup or bowl. To learn more about building a healthy plate, visit  www.choosemyplate.gov.    The food groups    Fruits. Any fruit or 100% fruit juice counts as part of the Fruit Group. Fruits may be fresh, canned, frozen, or dried, and may be whole, cut-up, or pureed. Make 1/2 of your plate fruits and vegetables.    Vegetables. Any vegetable or 100% vegetable juice counts as a member of the Vegetable Group. Vegetables may be fresh, frozen, canned, or dried. They can be served raw or cooked and may be whole, cut-up, or mashed. Make 1/2 of your plate fruits and vegetables.    Grains. All foods made from grains are part of the Grains Group. These include wheat, rice, oats, cornmeal, and barley. Grains are often used to make foods such as bread, pasta, oatmeal, cereal, tortillas, and grits. Grains should be no more than 1/4 of your plate. At least half of your grains should be whole grains.    Protein. This group includes meat, poultry, seafood, beans and peas, eggs, processed soy products (such as tofu), nuts (including nut butters), and seeds. Make protein choices no more than 1/4 of your plate. Meat and poultry choices should be lean or low fat.    Dairy. The Dairy Group includes all fluid milk products and foods made from milk that contain calcium, such as yogurt and cheese. (Foods that have little calcium, such as cream, butter, and cream cheese, are not part of this group.) Most dairy choices should be low-fat or fat-free.    Oils. Oils aren't a food group, but they do contain essential nutrients. However it's important to watch your intake of oils. These are fats that are liquid at room temperature. They include canola, corn, olive, soybean, vegetable, and sunflower oil. Foods that are mainly oil include mayonnaise, certain salad dressings, and soft margarines. You likely already get your daily oil allowance from the foods you eat.  Things to limit  Eating healthy also means limiting these things in your diet:       Salt (sodium). Many processed foods have a lot of sodium. To  keep sodium intake down, eat fresh vegetables, meats, poultry, and seafood when possible. Purchase low-sodium, reduced-sodium, or no-salt-added food products at the store. And don't add salt to your meals at home. Instead, season them with herbs and spices such as dill, oregano, cumin, and paprika. Or try adding flavor with lemon or lime zest and juice.    Saturated fat. Saturated fats are most often found in animal products such as beef, pork, and chicken. They are often solid at room temperature, such as butter. To reduce your saturated fat intake, choose leaner cuts of meat and poultry. And try healthier cooking methods such as grilling, broiling, roasting, or baking. For a simple lower-fat swap, use plain nonfat yogurt instead of mayonnaise when making potato salad or macaroni salad.    Added sugars. These are sugars added to foods. They are in foods such as ice cream, candy, soda, fruit drinks, sports drinks, energy drinks, cookies, pastries, jams, and syrups. Cut down on added sugars by sharing sweet treats with a family member or friend. You can also choose fruit for dessert, and drink water or other unsweetened beverages.     [employer] last reviewed this educational content on 6/1/2020 2000-2021 [employer]. All rights reserved. This information is not intended as a substitute for professional medical care. Always follow your healthcare professional's instructions.        Activities of Daily Living    Your Health Risk Assessment indicates you have difficulties with activities of daily living such as housework, bathing, preparing meals, taking medication, etc. Your provider addressed this with you at today's appointment.    Urinary Incontinence, Female (Adult)   Urinary incontinence means loss of bladder control. This problem affects many women, especially as they get older. If you have incontinence, you may be embarrassed to ask for help. But know that this problem can be treated.   Types of  Incontinence  There are different types of incontinence. Two of the main types are described here. You can have more than one type.     Stress incontinence. With this type, urine leaks when pressure (stress) is put on the bladder. This may happen when you cough, sneeze, or laugh. Stress incontinence most often occurs because the pelvic floor muscles that support the bladder and urethra are weak. This can happen after pregnancy and vaginal childbirth or a hysterectomy. It can also be due to excess body weight or hormone changes.    Urge incontinence (also called overactive bladder). With this type, a sudden urge to urinate is felt often. This may happen even though there may not be much urine in the bladder. The need to urinate often during the night is common. Urge incontinence most often occurs because of bladder spasms. This may be due to bladder irritation or infection. Damage to bladder nerves or pelvic muscles, constipation, and certain medicines can also lead to urge incontinence.  Treatment depends on the cause. Further evaluation is needed to find the type you have. This will likely include an exam and certain tests. Based on the results, you and your healthcare provider can then plan treatment. Until a diagnosis is made, the home care tips below can help ease symptoms.   Home care    Do pelvic floor muscle exercises, if they are prescribed. The pelvic floor muscles help support the bladder and urethra. Many women find that their symptoms improve when doing special exercises that strengthen these muscles. To do the exercises, contract the muscles you would use to stop your stream of urine. But do this when you re not urinating. Hold for 10 seconds, then relax. Repeat 10 to 20 times in a row, at least 3 times a day. Your healthcare provider may give you other instructions for how to do the exercises and how often.    Keep a bladder diary. This helps track how often and how much you urinate over a set period  of time. Bring this diary with you to your next visit with the provider. The information can help your provider learn more about your bladder problem.    Lose weight, if advised to by your provider. Extra weight puts pressure on the bladder. Your provider can help you create a weight-loss plan that s right for you. This may include exercising more and making certain diet changes.    Don't have foods and drinks that may irritate the bladder. These can include alcohol and caffeinated drinks.    Quit smoking. Smoking and other tobacco use can lead to a long-term (chronic) cough that strains the pelvic floor muscles. Smoking may also damage the bladder and urethra. Talk with your provider about treatments or methods you can use to quit smoking.    If drinking large amounts of fluid makes you have symptoms, you may be advised to limit your fluid intake. You may also be advised to drink most of your fluids during the day and to limit fluids at night.    If you re worried about urine leakage or accidents, you may wear absorbent pads to catch urine. Change the pads often. This helps reduce discomfort. It may also reduce the risk of skin or bladder infections.    Follow-up care  Follow up with your healthcare provider, or as directed. It may take some to find the right treatment for your problem. But healthy lifestyle changes can be made right away. These include such things as exercising on a regular basis, eating a healthy diet, losing weight (if needed), and quitting smoking. Your treatment plan may include special therapies or medicines. Certain procedures or surgery may also be options. Talk about any questions you have with your provider.   When to seek medical advice  Call the healthcare provider right away if any of these occur:    Fever of 100.4 F (38 C) or higher, or as directed by your provider    Bladder pain or fullness    Belly swelling    Nausea or vomiting    Back pain    Weakness, dizziness, or  yumiko Williamson last reviewed this educational content on 1/1/2020 2000-2021 [employer]. All rights reserved. This information is not intended as a substitute for professional medical care. Always follow your healthcare professional's instructions.

## 2022-02-07 NOTE — PROGRESS NOTES
"SUBJECTIVE:   Karen Arthur is a 86 year old female who presents for Preventive Visit.    HPI-in addition to her annual wellness visit, patient is here to follow-up medical problems including dementia, cardiomyopathy, history of CVA, hypertension, osteoporosis, hypothyroidism and hyperlipidemia.  See assessment plan for details.    Patient has been advised of split billing requirements and indicates understanding: Yes  Are you in the first 12 months of your Medicare coverage?  No    Healthy Habits:     In general, how would you rate your overall health?  Fair    Frequency of exercise:  4-5 days/week    Duration of exercise:  15-30 minutes    Do you usually eat at least 4 servings of fruit and vegetables a day, include whole grains    & fiber and avoid regularly eating high fat or \"junk\" foods?  No    Taking medications regularly:  Yes    Medication side effects:  None    Ability to successfully perform activities of daily living:  Transportation requires assistance, shopping requires assistance, preparing meals requires assistance, housework requires assistance, laundry requires assistance, medication administration requires assistance and money management requires assistance    Home Safety:  No safety concerns identified    Hearing Impairment:  No hearing concerns    In the past 6 months, have you been bothered by leaking of urine? Yes    In general, how would you rate your overall mental or emotional health?  Good      PHQ-2 Total Score: 1    Additional concerns today:  Yes    Do you feel safe in your environment? Yes    Have you ever done Advance Care Planning? (For example, a Health Directive, POLST, or a discussion with a medical provider or your loved ones about your wishes): Yes, patient states has an Advance Care Planning document and will bring a copy to the clinic.       Fall risk  Fallen 2 or more times in the past year?: (P) No  Any fall with injury in the past year?: (P) No    Cognitive Screening Not " "appropriate due to known dementia    Do you have sleep apnea, excessive snoring or daytime drowsiness?: no    Reviewed and updated as needed this visit by clinical staff  Tobacco  Allergies  Meds             Reviewed and updated as needed this visit by Provider               Social History     Tobacco Use     Smoking status: Never Smoker     Smokeless tobacco: Never Used   Substance Use Topics     Alcohol use: Yes     Alcohol/week: 1.0 standard drink     Comment: Alcoholic Drinks/day: glass of wine with dinner         Alcohol Use 2/7/2022   Prescreen: >3 drinks/day or >7 drinks/week? No               Current providers sharing in care for this patient include:   Patient Care Team:  Kehinde Tate MD as PCP - General  Norberto Ochoa, PharmD as Pharmacist (Pharmacist)  Kehinde Tate MD as Assigned PCP  Chanel Rodríguez NP as Assigned Heart and Vascular Provider  Diego Mcclendon MD as Assigned Surgical Provider  Ginny Mcclendon DO as Assigned Neuroscience Provider    The following health maintenance items are reviewed in Epic and correct as of today:  Health Maintenance Due   Topic Date Due     ANNUAL REVIEW OF HM ORDERS  Never done     FALL RISK ASSESSMENT  02/02/2022     MEDICARE ANNUAL WELLNESS VISIT  02/02/2022     Lab work is in process        Review of Systems  12 point review of systems is negative other than what is discussed in the assessment and plan    OBJECTIVE:   /66 (BP Location: Right arm, Patient Position: Sitting, Cuff Size: Adult Regular)   Ht 1.588 m (5' 2.5\")   Wt 55.8 kg (123 lb 1.6 oz)   BMI 22.16 kg/m   Estimated body mass index is 22.16 kg/m  as calculated from the following:    Height as of this encounter: 1.588 m (5' 2.5\").    Weight as of this encounter: 55.8 kg (123 lb 1.6 oz).  Physical Exam  EYES: Eyelids, conjunctiva, and sclera were normal. Pupils were normal. Cornea, iris, and lens were normal bilaterally.  HEAD, EARS, NOSE, MOUTH, AND THROAT: Head " and face were normal. TMs and external auditory canals are normal  NECK: Neck appearance was normal. There were no neck masses and the thyroid was not enlarged and no nodules are felt.  No lymphadenopathy.  RESPIRATORY: Breathing pattern was normal and the chest moved symmetrically.   Lung sounds were normal and there were no rales or wheezes.  CARDIOVASCULAR: Heart rate and rhythm were normal.  S1 and S2 were normal and there were no extra sounds or murmurs. Peripheral pulses in arms and legs were normal.  Jugular venous pressure was normal.  There was no peripheral edema.  No carotid bruits.  GASTROINTESTINAL:  Bowel sounds were present.   Palpation detected no tenderness, mass, or enlarged organs.   MUSCULOSKELETAL: Skeletal configuration was normal and muscle mass was normal for age. Joint appearance was overall normal.  LYMPHATIC: There were no enlarged nodes.  SKIN/HAIR/NAILS: Abnormal lesion on the back of her left hand worrisome for squamous cell skin cancer.  Additional papular lesion on back.  NEUROLOGIC: Motor intact.  Normal cranial nerves.  PSYCHIATRIC:  Mood and affect were normal         ASSESSMENT / PLAN:   1. Encounter for Medicare annual wellness exam  Immunizations are reviewed and recommending getting her tetanus updated before summer 2022.  She has a living well.  Non-smoker.  Uses alcohol in moderation although I am recommending stopping her nightly glass of wine to reduce fall risk.  Regular exercise discussed.  Further colonoscopy not indicated at her age and with her comorbidities.  She is not interested in further mammograms for breast cancer screening.  Last DEXA was 2017 and this should be repeated this year.   Recommending that she sees an ophthalmologist every year. Skin exam performed and recommending regular use of sunblock.  She needs to schedule follow-up appoint with her dermatologist.  Will screen for diabetes with fasting glucose.       2. Late onset Alzheimer's dementia without  behavioral disturbance (H)  Alzheimer's dementia with gradual progression.   provides supervision and she does need help with several ADLs but still able to get herself dressed every morning and taking care of her toileting and hygiene needs.  Unable to tolerate Aricept or Exelon but continues on Namenda 10 mg twice daily.    3. Cardiomyopathy, nonischemic (H)  Last echocardiogram demonstrating improved left ventricular systolic function.  She is asymptomatic and appears euvolemic    4. NSVT (nonsustained ventricular tachycardia) (H)  Continue carvedilol    5. ASCVD (arteriosclerotic cardiovascular disease)  Coronary angiogram demonstrating moderate coronary artery disease.  Continue medical management with aspirin, statin and good blood pressure control.  Asymptomatic without exertional chest pain.    6. History of CVA (cerebrovascular accident)  Hospitalized in early 2020 with CVA following coronary angiogram.  She continues on aspirin 325 mg daily along with rosuvastatin.  Good blood pressure control.  No recurrent TIA or CVA symptoms.    7. 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  Followed by spine clinic.  Epidural steroid injection providing only temporary relief.  Plans for rhizotomy were discussed today including possibility of increase pain initially.  Already on Relafen 750 mg twice daily and using acetaminophen 1000 mg 3 times daily.  If additional pain control is needed, tramadol would be an option although I would discourage using this at this time given potential side effects including increasing sedation, altered mental status and increasing risk of falls.  Hopefully she can get rhizotomy completed soon once insurance improves.    8. Essential hypertension  Blood pressure well controlled with current medication  - UA Macro with Reflex to Micro and Culture - lab collect; Future  -  "CBC with platelets; Future  - Comprehensive metabolic panel (BMP + Alb, Alk Phos, ALT, AST, Total. Bili, TP); Future  - CBC with platelets  - Comprehensive metabolic panel (BMP + Alb, Alk Phos, ALT, AST, Total. Bili, TP)  - UA Macro with Reflex to Micro and Culture - lab collect  - Urine Microscopic    9. Age-related osteoporosis without current pathological fracture  Previous hip fracture.  Completed 5 years of Prolia in February 2020.  Maintaining good calcium and vitamin D intake.  We will make arrangements for DEXA this year to reassess and confirm ongoing stability.  - DX Hip/Pelvis/Spine; Future    10. Vitamin D deficiency  Recheck vitamin D level on replacement  - Vitamin D deficiency screening; Future  - Vitamin D deficiency screening    11. Hypothyroidism, unspecified type  Monitor TSH and adjust dose of levothyroxine as needed  - TSH; Future  - TSH    12. Hyperlipidemia, unspecified hyperlipidemia type  Recheck a lipid profile on rosuvastatin  - Lipid Profile (Chol, Trig, HDL, LDL calc); Future  - Lipid Profile (Chol, Trig, HDL, LDL calc)    13. History of skin cancer  Lesion on dorsum of left hand suspicious for squamous cell skin cancer and I am recommending that she see her dermatologist ASAP.  Additional papule on her back and nonspecific lesion on her left leg should be evaluated as well.    14. Gastroesophageal reflux disease without esophagitis  Reflux is well controlled taking lansoprazole    15. Chronic insomnia  Continues melatonin for chronic insomnia    Patient has been advised of split billing requirements and indicates understanding: Yes        Estimated body mass index is 22.16 kg/m  as calculated from the following:    Height as of this encounter: 1.588 m (5' 2.5\").    Weight as of this encounter: 55.8 kg (123 lb 1.6 oz).        She reports that she has never smoked. She has never used smokeless tobacco.      Appropriate preventive services were discussed with this patient, including " applicable screening as appropriate for cardiovascular disease, diabetes, osteopenia/osteoporosis, and glaucoma.  As appropriate for age/gender, discussed screening for colorectal cancer, prostate cancer, breast cancer, and cervical cancer. Checklist reviewing preventive services available has been given to the patient.    Reviewed patients plan of care and provided an AVS. The Basic Care Plan (routine screening as documented in Health Maintenance) for Karen Roper meets the Care Plan requirement. This Care Plan has been established and reviewed with the spouse.    Counseling Resources:  ATP IV Guidelines  Pooled Cohorts Equation Calculator  Breast Cancer Risk Calculator  Breast Cancer: Medication to Reduce Risk  FRAX Risk Assessment  ICSI Preventive Guidelines  Dietary Guidelines for Americans, 2010  [employer] MyPlate  ASA Prophylaxis  Lung CA Screening    Kehinde Tate MD  [facility]    Identified Health Risks:    The patient was provided with suggestions to help her develop a healthy physical lifestyle.  The patient was counseled and encouraged to consider modifying their diet and eating habits. She was provided with information on recommended healthy diet options.  The patient reports that she has difficulty with activities of daily living. This issue was addressed at today's appointment.  Assistance provided by spouse and family  Information on urinary incontinence and treatment options given to patient.

## 2022-02-08 ENCOUNTER — TELEPHONE (OUTPATIENT)
Dept: PHYSICAL MEDICINE AND REHAB | Facility: CLINIC | Age: 87
End: 2022-02-08
Payer: COMMERCIAL

## 2022-02-08 DIAGNOSIS — F41.9 ANXIETY DUE TO INVASIVE PROCEDURE: ICD-10-CM

## 2022-02-08 DIAGNOSIS — M47.816 SPONDYLOSIS OF LUMBAR REGION WITHOUT MYELOPATHY OR RADICULOPATHY: Primary | ICD-10-CM

## 2022-02-08 LAB — DEPRECATED CALCIDIOL+CALCIFEROL SERPL-MC: 65 UG/L (ref 30–80)

## 2022-02-08 RX ORDER — LORAZEPAM 0.5 MG/1
TABLET ORAL
Qty: 2 TABLET | Refills: 0 | Status: SHIPPED | OUTPATIENT
Start: 2022-02-08 | End: 2022-04-26

## 2022-02-08 NOTE — TELEPHONE ENCOUNTER
Ativan 0.5 mg #2 with no refills sent to pharmacy for preprocedure sedation.  I will send a message to Dr. Puente's CSS for phone consent.

## 2022-02-08 NOTE — TELEPHONE ENCOUNTER
Call to patient to review pain diary that was received for bilateral L3, L4, L5 RFA with Dr. Puente. Patient with positive response and recommended to proceed to medial branch radio frequency ablation. Patient agrees with plan of care and requesting oral sedation for day of procedure. Explained that ROMI Colunga PA-C will be notified of request. Discussed procedure requirements and patient verbalized understanding. Order has been placed and PA process initiated. Instructed patient that she will be contacted once PA has been approved and will be assisted with scheduling at that time. Also explained that she will be contacted by an Nurse once rx for oral sedation has been ordered and that patient will be contacted for a phone consent prior to the scheduled procedure. Encouraged patient to contact clinic with any questions or concerns.    Carina ORTIZ RN [facility] 2/8/2022 3:16 PM

## 2022-02-09 ENCOUNTER — TELEPHONE (OUTPATIENT)
Dept: PHYSICAL MEDICINE AND REHAB | Facility: CLINIC | Age: 87
End: 2022-02-09
Payer: COMMERCIAL

## 2022-02-09 NOTE — TELEPHONE ENCOUNTER
PHONE CONSENT:    The patient wished to take pre-procedure oral sedation so a phone consent was obtained prior to their injection.  The patient has been offered other conservative treatment (physical therapy, medications, etc.) but has opted to proceed with an injection.  The risks and benefits of the injection (bilateral L3, L4, L5 medial branch/dorsal rami radiofrequency ablation) were discussed with the patient over the phone on 2-9-22 at 14:35.  Her COVID booster was 10-8-21.  Risks of the injection include infection, bleeding, damage to surrounding structures, nerve injury, permanent weakness, permanent paralysis, worsened pain, soreness, headache, allergic reaction, no change in pain.      The patient understands that they cannot have symptoms of an infection or be on antibiotics at the time of the injection as this would increase their risk of infection. If they start antibiotics prior to the procedure, they should call the clinic to see if the procedure will need to be rescheduled.  The patient understands that if they start any blood thinners prior to the injection, the provider must be notified immediately.  The patient denies any allergies to iodine contrast dye or iodine products.  The patient denies any symptoms of an active infection (cough, fevers, myalgias) and denies taking antibiotics.  The patient knows not to come in to clinic if they have any symptoms of an active infection, particularly cough, fevers, myalgias.   The patient denies taking any prescription blood thinning medications. The patient denies any allergies to iodine or iodine contrast.       The patient verbalized understanding of the information given. The patient was given the opportunity to ask questions of the physician.  The patient elected to proceed and gave consent over the phone with Nate Breyer, RN as a witness.  Informed consent form was signed by the physician and the witness.    At the end of the encounter, the patient  states that in the last 2 to 3 days she has had absolutely no back pain.  She questions if she wants to move forward with the procedure.  At this point I recommended that she hold on moving forward with the procedure as long as her back feels good.  Once her back pain returns, she is welcome to call us to schedule the radiofrequency ablation procedure.  If it is within 90 days, the consent will be good.  If it is outside of 90 days, I will need to call her to reconsent her.    Her  was worried about post procedure pain and asked if the pain medication could be prescribed.  I explained that we do not prescribe pain medications prior to the procedure, as this has been frowned upon given the opioid crisis.  However I did reassure them that if she has post procedure pain that we would want him to call the clinic right away and of course would prescribe medications for her.  I did review her primary care physician's note to documented tramadol as a possible option for post procedure pain.  This does carry a fairly significant risk in her age population, so we may want to go with something more like Norco as opposed to tramadol, but we will consider this if we need to prescribe an opiate pain medication to manage any post procedure pain.  Her and her  were in agreement with this plan.

## 2022-02-28 ENCOUNTER — RADIOLOGY INJECTION OFFICE VISIT (OUTPATIENT)
Dept: PHYSICAL MEDICINE AND REHAB | Facility: CLINIC | Age: 87
End: 2022-02-28
Attending: PHYSICIAN ASSISTANT
Payer: COMMERCIAL

## 2022-02-28 VITALS — SYSTOLIC BLOOD PRESSURE: 106 MMHG | DIASTOLIC BLOOD PRESSURE: 58 MMHG | OXYGEN SATURATION: 92 % | HEART RATE: 66 BPM

## 2022-02-28 DIAGNOSIS — M47.816 SPONDYLOSIS OF LUMBAR REGION WITHOUT MYELOPATHY OR RADICULOPATHY: ICD-10-CM

## 2022-02-28 PROCEDURE — 64635 DESTROY LUMB/SAC FACET JNT: CPT | Mod: 50 | Performed by: PHYSICAL MEDICINE & REHABILITATION

## 2022-02-28 PROCEDURE — 64636 DESTROY L/S FACET JNT ADDL: CPT | Mod: 50 | Performed by: PHYSICAL MEDICINE & REHABILITATION

## 2022-02-28 RX ORDER — LIDOCAINE HYDROCHLORIDE 20 MG/ML
INJECTION, SOLUTION EPIDURAL; INFILTRATION; INTRACAUDAL; PERINEURAL
Status: COMPLETED | OUTPATIENT
Start: 2022-02-28 | End: 2022-02-28

## 2022-02-28 RX ADMIN — LIDOCAINE HYDROCHLORIDE 15 ML: 20 INJECTION, SOLUTION EPIDURAL; INFILTRATION; INTRACAUDAL; PERINEURAL at 10:34

## 2022-02-28 ASSESSMENT — PAIN SCALES - GENERAL
PAINLEVEL: NO PAIN (1)
PAINLEVEL: MODERATE PAIN (5)

## 2022-02-28 NOTE — PATIENT INSTRUCTIONS
DISCHARGE INSTRUCTIONS    During office hours (8:00 a.m.- 4:00 p.m.) questions or concerns may be answered  by calling [facility] Nurses at  [phone].  Messages received after hours will be returned the following business day.      In the case of an emergency, please dial 911 or seek assistance at the nearest Emergency Room/Urgent Care facility.     All Patients:  ? You may experience an increase in your symptoms for the first 2 days, once the numbing medication wears off.    ? You may resume your regular medication, no pain medication until you have completed your diary    ? You may shower. No swimming, tub bath or hot tub for 24 hours; remove bandage after 4 hours    ? Continue your activities that can cause you pain to test the blocks.                         ? You should not drive for the next 3 to 5 hours (have someone drive you)           POSSIBLE PROCEDURE SIDE EFFECTS  -Call Spine Center if concerned-    Increased Pain  Increased numbness/tingling     Nausea/Vomiting  Bruising/bleeding at site (hematoma)             Swelling at site (edema) Headache  Difficulty walking  Infection        Fever greater than 100.5

## 2022-03-14 ENCOUNTER — ANCILLARY PROCEDURE (OUTPATIENT)
Dept: BONE DENSITY | Facility: CLINIC | Age: 87
End: 2022-03-14
Attending: INTERNAL MEDICINE
Payer: COMMERCIAL

## 2022-03-14 DIAGNOSIS — M81.0 AGE-RELATED OSTEOPOROSIS WITHOUT CURRENT PATHOLOGICAL FRACTURE: ICD-10-CM

## 2022-03-14 DIAGNOSIS — Z78.0 ASYMPTOMATIC POSTMENOPAUSAL STATUS: ICD-10-CM

## 2022-03-14 PROCEDURE — 77080 DXA BONE DENSITY AXIAL: CPT | Mod: TC | Performed by: RADIOLOGY

## 2022-03-14 PROCEDURE — 77081 DXA BONE DENSITY APPENDICULR: CPT | Mod: TC | Performed by: RADIOLOGY

## 2022-03-16 PROBLEM — M81.0 OSTEOPOROSIS: Status: ACTIVE | Noted: 2022-03-16

## 2022-03-26 DIAGNOSIS — G89.29 CHRONIC BILATERAL LOW BACK PAIN WITH BILATERAL SCIATICA: ICD-10-CM

## 2022-03-26 DIAGNOSIS — F03.90 DEMENTIA WITHOUT BEHAVIORAL DISTURBANCE, UNSPECIFIED DEMENTIA TYPE: ICD-10-CM

## 2022-03-26 DIAGNOSIS — M54.42 CHRONIC BILATERAL LOW BACK PAIN WITH BILATERAL SCIATICA: ICD-10-CM

## 2022-03-26 DIAGNOSIS — I42.8 NONISCHEMIC CARDIOMYOPATHY (H): ICD-10-CM

## 2022-03-26 DIAGNOSIS — M54.41 CHRONIC BILATERAL LOW BACK PAIN WITH BILATERAL SCIATICA: ICD-10-CM

## 2022-03-28 RX ORDER — MEMANTINE HYDROCHLORIDE 10 MG/1
TABLET ORAL
Qty: 180 TABLET | Refills: 3 | Status: SHIPPED | OUTPATIENT
Start: 2022-03-28 | End: 2023-01-01

## 2022-03-28 RX ORDER — CARVEDILOL 12.5 MG/1
TABLET ORAL
Qty: 180 TABLET | Refills: 3 | Status: SHIPPED | OUTPATIENT
Start: 2022-03-28 | End: 2023-01-01

## 2022-03-28 NOTE — PROGRESS NOTES
Assessment:   Karen Arthur is a 87 y.o. y.o. female with past medical history significant for hypothyroidism, hyperlipidemia, vitamin D deficiency, restless leg syndrome, dementia, hypertension, GERD, nonischemic cardiomyopathy, osteoporosis who presents today for follow-up regarding chronic low back pain and bilateral lower extremity pain.   My review of an MRI lumbar spine shows significant dextroconvex scoliosis with multilevel high-grade spinal canal and neural foraminal stenosis.  Patient status post bilateral L3, L4, L5 radiofrequency ablation February 20, 2022.  Patient reports she is no longer experiencing significant right-sided low back pain.  Unfortunately, left low back pain is worse.  On exam today she appears to be primarily symptomatic from left sacroiliac joint dysfunction.  She had reproduction of her pain with SI joint provocative maneuvers on the left x4.  She is neurologically intact.       Plan:     A shared decision making plan was used.  The patient's values and choices were respected.  The following represents what was discussed and decided upon by the physician assistant and the patient.  Patient is accompanied by her  who her son was on speaker phone.  Helped to provide history since she has dementia.    1.  DIAGNOSTIC TESTS: I reviewed the MRI lumbar spine.  No further diagnostic tests were ordered.      2.  PHYSICAL THERAPY: Patient participated in physical therapy at [place] [facility] in 2019.  I entered a new referral for physical therapy targeting the sacroiliac joint.    3.  MEDICATIONS: No changes are made to the patient's medications.  She uses Tylenol 1000 milligrams 3 times daily, nabumetone 750 mg twice daily. She has tried topical pain relievers but they were not helpful.  -In the past we have discussed trying additional pain relieving medications such as gabapentin but she declined due to concern for side effects.  -I provided a prescription for a  sacroiliac belt.  She can wear this as needed.      4.  INTERVENTIONS:   -I offer the patient a left sacroiliac joint injection.  Patient indicated she would like to proceed and an order was placed.      5.  PATIENT EDUCATION:    -Patient is in agreement the above plan.  All questions were answered.    6.  FOLLOW-UP: Patient will follow up with me 2 weeks after her left sacroiliac joint injection.  If she has questions or concerns, she should not hesitate to call.    Subjective:     Karen Arthur is a 87 y.o. female who presents today for follow-up regarding chronic low back pain with radiation into the bilateral lower extremities.  Patient is following up after bilateral L3, L4, L5 radiofrequency ablation February 20, 2022.  Patient reports that she is no longer experiencing significant right-sided low back pain.  Unfortunately, left low back pain is more severe.  She denies any injury or event to cause the pain to worsen.  At first she thought the increased pain was just postprocedural soreness, but it has not improved.  She does report improvement in her leg pain.    Patient complains of left-sided low back/upper buttock pain.  Patient points to the sacroiliac joint when pointing to the pain.  Occasionally she feels some pain radiating down the left posterior lateral thigh but that is improved compared with before the injection.  She is also been complaining of some right knee pain but that does not seem related to her back.  She denies any significant right-sided low back pain today.  She rates her pain today as a 4 out of 10.  At its worst it is an 8 out of 10.  At its best it is a 1 out of 10.  Pain is aggravated with walking and standing.  Patient's  notes that she has been less active and not as mobile since his pain increased.  Pain is alleviated with lying down and sitting.    Patient went to physical therapy at [place] [facility] in [place] 2019 for her lower back.She uses Tylenol  1000 mill grams 3 times daily, nabumetone 750 mg twice daily. These medications are  helpful.        Review of Systems:  Positive for weakness. Negative for numbness/tingling, loss of bowel/bladder control, inability to urinate, headache, pain much worse at night, trip/stumble/falls, difficulty swallowing, fevers, unintentional weight loss, difficulty with hand skills.     Objective:   CONSTITUTIONAL:  Vital signs as above.  No acute distress.  The patient is well nourished and well groomed.    PSYCHIATRIC:  The patient is awake, alert, oriented to person, place and time.  The patient is answering questions appropriately with clear speech.  Normal affect.  HEENT: Normocephalic, atraumatic.  Sclera clear.    SKIN:  Skin over the face, posterior torso, bilateral upper and lower extremities is clean, dry, intact without rashes.  MUSCULOSKELETAL: Patient transitions from seated to standing independently.  Patient has a significant scoliotic deformity.   No significant tenderness palpation bilateral lower lumbar paraspinous muscles.  Significant tenderness palpation left sacroiliac joint.  Mild tenderness palpation left greater trochanter.  Patient is able to rise onto toes and heels bilaterally with support.  The patient has 5/5 strength for the bilateral hip flexors, knee flexors/extensors, ankle dorsiflexors.  Positive pelvic distraction test reproducing left SI joint pain.  Positive thigh thrust left, negative thigh thrust right.  Positive Marquis's test left reproducing left SI joint pain.  Negative Marquis's test right.  Positive Gaenslen's left, negative Gaenslen's test right.  Negative Yeomans test bilaterally.  NEUROLOGICAL: 1-2+ bilateral and Achilles patellar reflexes.   Sensation intact to light touch bilateral lower extremities throughout.     RESULTS:  I reviewed the MRI lumbar spine from [facility] dated November 22, 2021.  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  There is multilevel lumbar spondylosis.  At T12-L1 there is severe left foraminal stenosis.  At L1-L2 there is moderate left foraminal stenosis.  At L2-3 there is moderate spinal canal stenosis with mild to moderate right and moderate to severe left foraminal stenosis.  At L3-4 there is moderate to severe spinal canal stenosis with moderate bilateral foraminal stenosis.  At L4-5 there is mild spinal canal stenosis with moderate to severe right and mild left foraminal stenosis.  At L5-S1 there is moderate to severe right foraminal stenosis.  Please see report for further details.

## 2022-03-28 NOTE — TELEPHONE ENCOUNTER
"Routing refill request to provider for review/approval because:  Labs out of range:  Multiple    Last Written Prescription Date:  11/1/21  Last Fill Quantity: 60,  # refills: 4   Last office visit provider:  2/7/22     Requested Prescriptions   Pending Prescriptions Disp Refills     nabumetone (RELAFEN) 750 MG tablet [Pharmacy Med Name: Nabumetone Oral Tablet 750 MG] 180 tablet 0     Sig: Take 1 tablet (750 mg total) by mouth 2 (two) times a day.       NSAID Medications Failed - 3/26/2022  2:23 PM        Failed - Patient is age 6-64 years        Failed - Normal CBC on file in past 12 months     Recent Labs   Lab Test 02/07/22  1022   WBC 7.8   RBC 3.50*   HGB 11.6*   HCT 35.8                    Failed - Normal serum creatinine on file in past 12 months     Recent Labs   Lab Test 02/07/22  1022   CR 1.35*       Ok to refill medication if creatinine is low          Passed - Blood pressure under 140/90 in past 12 months     BP Readings from Last 3 Encounters:   02/28/22 106/58   02/07/22 138/66   02/02/22 124/60                 Passed - Normal ALT on file in past 12 months     Recent Labs   Lab Test 02/07/22  1022   ALT 15             Passed - Normal AST on file in past 12 months     Recent Labs   Lab Test 02/07/22  1022   AST 20             Passed - Recent (12 mo) or future (30 days) visit within the authorizing provider's specialty     Patient has had an office visit with the authorizing provider or a provider within the authorizing providers department within the previous 12 mos or has a future within next 30 days. See \"Patient Info\" tab in inbasket, or \"Choose Columns\" in Meds & Orders section of the refill encounter.              Passed - Medication is active on med list        Passed - No active pregnancy on record        Passed - No positive pregnancy test in past 12 months         Signed Prescriptions Disp Refills    memantine (NAMENDA) 10 MG tablet 180 tablet 3     Sig: TAKE 1 TABLET BY MOUTH TWICE " "DAILY.       Miscellaneous Dementia Agents Passed - 3/26/2022  2:23 PM        Passed - Recent (12 mo) or future (30 days) visit within the authorizing provider's specialty     Patient has had an office visit with the authorizing provider or a provider within the authorizing providers department within the previous 12 mos or has a future within next 30 days. See \"Patient Info\" tab in inbasket, or \"Choose Columns\" in Meds & Orders section of the refill encounter.              Passed - Medication is active on med list        Passed - Patient is 18 years of age or older          carvedilol (COREG) 12.5 MG tablet 180 tablet 3     Sig: Take 1 tablet (12.5 mg total) by mouth 2 (two) times a day.       Beta-Blockers Protocol Passed - 3/26/2022  2:23 PM        Passed - Blood pressure under 140/90 in past 12 months     BP Readings from Last 3 Encounters:   02/28/22 106/58   02/07/22 138/66   02/02/22 124/60                 Passed - Patient is age 6 or older        Passed - Recent (12 mo) or future (30 days) visit within the authorizing provider's specialty     Patient has had an office visit with the authorizing provider or a provider within the authorizing providers department within the previous 12 mos or has a future within next 30 days. See \"Patient Info\" tab in inbasket, or \"Choose Columns\" in Meds & Orders section of the refill encounter.              Passed - Medication is active on med list             Barber Sheth RN 03/28/22 2:53 PM  "

## 2022-03-28 NOTE — TELEPHONE ENCOUNTER
"Last Written Prescription Date:  1/10/22  Last Fill Quantity: 180,  # refills: 0   Last office visit provider:  2/7/22     Last Written Prescription Date:  7/8/21  Last Fill Quantity: 180,  # refills: 2   Last office visit provider:  2/7/22    Requested Prescriptions   Pending Prescriptions Disp Refills     memantine (NAMENDA) 10 MG tablet [Pharmacy Med Name: Memantine HCl Oral Tablet 10 MG] 180 tablet 0     Sig: TAKE 1 TABLET BY MOUTH TWICE DAILY.       Miscellaneous Dementia Agents Passed - 3/26/2022  2:23 PM        Passed - Recent (12 mo) or future (30 days) visit within the authorizing provider's specialty     Patient has had an office visit with the authorizing provider or a provider within the authorizing providers department within the previous 12 mos or has a future within next 30 days. See \"Patient Info\" tab in inbasket, or \"Choose Columns\" in Meds & Orders section of the refill encounter.              Passed - Medication is active on med list        Passed - Patient is 18 years of age or older           carvedilol (COREG) 12.5 MG tablet [Pharmacy Med Name: Carvedilol Oral Tablet 12.5 MG] 180 tablet 0     Sig: Take 1 tablet (12.5 mg total) by mouth 2 (two) times a day.       Beta-Blockers Protocol Passed - 3/26/2022  2:23 PM        Passed - Blood pressure under 140/90 in past 12 months     BP Readings from Last 3 Encounters:   02/28/22 106/58   02/07/22 138/66   02/02/22 124/60                 Passed - Patient is age 6 or older        Passed - Recent (12 mo) or future (30 days) visit within the authorizing provider's specialty     Patient has had an office visit with the authorizing provider or a provider within the authorizing providers department within the previous 12 mos or has a future within next 30 days. See \"Patient Info\" tab in inbasket, or \"Choose Columns\" in Meds & Orders section of the refill encounter.              Passed - Medication is active on med list           nabumetone (RELAFEN) 750 MG " "tablet [Pharmacy Med Name: Nabumetone Oral Tablet 750 MG] 60 tablet 0     Sig: Take 1 tablet (750 mg total) by mouth 2 (two) times a day.       NSAID Medications Failed - 3/26/2022  2:23 PM        Failed - Patient is age 6-64 years        Failed - Normal CBC on file in past 12 months     Recent Labs   Lab Test 02/07/22  1022   WBC 7.8   RBC 3.50*   HGB 11.6*   HCT 35.8                    Failed - Normal serum creatinine on file in past 12 months     Recent Labs   Lab Test 02/07/22  1022   CR 1.35*       Ok to refill medication if creatinine is low          Passed - Blood pressure under 140/90 in past 12 months     BP Readings from Last 3 Encounters:   02/28/22 106/58   02/07/22 138/66   02/02/22 124/60                 Passed - Normal ALT on file in past 12 months     Recent Labs   Lab Test 02/07/22  1022   ALT 15             Passed - Normal AST on file in past 12 months     Recent Labs   Lab Test 02/07/22  1022   AST 20             Passed - Recent (12 mo) or future (30 days) visit within the authorizing provider's specialty     Patient has had an office visit with the authorizing provider or a provider within the authorizing providers department within the previous 12 mos or has a future within next 30 days. See \"Patient Info\" tab in inbasket, or \"Choose Columns\" in Meds & Orders section of the refill encounter.              Passed - Medication is active on med list        Passed - No active pregnancy on record        Passed - No positive pregnancy test in past 12 months             Barber Sheth RN 03/28/22 2:51 PM  "

## 2022-03-29 ENCOUNTER — OFFICE VISIT (OUTPATIENT)
Dept: PHYSICAL MEDICINE AND REHAB | Facility: CLINIC | Age: 87
End: 2022-03-29
Payer: COMMERCIAL

## 2022-03-29 VITALS
HEART RATE: 70 BPM | BODY MASS INDEX: 21.79 KG/M2 | DIASTOLIC BLOOD PRESSURE: 59 MMHG | HEIGHT: 63 IN | WEIGHT: 123 LBS | SYSTOLIC BLOOD PRESSURE: 107 MMHG

## 2022-03-29 DIAGNOSIS — M53.3 CHRONIC LEFT SACROILIAC JOINT PAIN: Primary | ICD-10-CM

## 2022-03-29 DIAGNOSIS — M41.9 SCOLIOSIS OF THORACOLUMBAR SPINE, UNSPECIFIED SCOLIOSIS TYPE: ICD-10-CM

## 2022-03-29 DIAGNOSIS — M47.816 LUMBAR FACET ARTHROPATHY: ICD-10-CM

## 2022-03-29 DIAGNOSIS — G89.29 CHRONIC LEFT SACROILIAC JOINT PAIN: Primary | ICD-10-CM

## 2022-03-29 PROCEDURE — 99214 OFFICE O/P EST MOD 30 MIN: CPT | Performed by: PHYSICIAN ASSISTANT

## 2022-03-29 ASSESSMENT — PAIN SCALES - GENERAL: PAINLEVEL: MODERATE PAIN (4)

## 2022-03-29 NOTE — LETTER
3/29/2022         RE: Karen Arthur  [address]  [facility] [address]        Dear Colleague,    Thank you for referring your patient, Karen Arthur, to the [facility] [place]. Please see a copy of my visit note below.    Assessment:   Karen Arthur is a 87 y.o. y.o. female with past medical history significant for hypothyroidism, hyperlipidemia, vitamin D deficiency, restless leg syndrome, dementia, hypertension, GERD, nonischemic cardiomyopathy, osteoporosis who presents today for follow-up regarding chronic low back pain and bilateral lower extremity pain.   My review of an MRI lumbar spine shows significant dextroconvex scoliosis with multilevel high-grade spinal canal and neural foraminal stenosis.  Patient status post bilateral L3, L4, L5 radiofrequency ablation February 20, 2022.  Patient reports she is no longer experiencing significant right-sided low back pain.  Unfortunately, left low back pain is worse.  On exam today she appears to be primarily symptomatic from left sacroiliac joint dysfunction.  She had reproduction of her pain with SI joint provocative maneuvers on the left x4.  She is neurologically intact.       Plan:     A shared decision making plan was used.  The patient's values and choices were respected.  The following represents what was discussed and decided upon by the physician assistant and the patient.  Patient is accompanied by her  who her son was on speaker phone.  Helped to provide history since she has dementia.    1.  DIAGNOSTIC TESTS: I reviewed the MRI lumbar spine.  No further diagnostic tests were ordered.      2.  PHYSICAL THERAPY: Patient participated in physical therapy at [place] for athletic medicine in 2019.  I entered a new referral for physical therapy targeting the sacroiliac joint.    3.  MEDICATIONS: No changes are made to the patient's medications.  She uses Tylenol 1000 milligrams 3 times daily, nabumetone 750 mg twice  daily. She has tried topical pain relievers but they were not helpful.  -In the past we have discussed trying additional pain relieving medications such as gabapentin but she declined due to concern for side effects.  -I provided a prescription for a sacroiliac belt.  She can wear this as needed.      4.  INTERVENTIONS:   -I offer the patient a left sacroiliac joint injection.  Patient indicated she would like to proceed and an order was placed.      5.  PATIENT EDUCATION:    -Patient is in agreement the above plan.  All questions were answered.    6.  FOLLOW-UP: Patient will follow up with me 2 weeks after her left sacroiliac joint injection.  If she has questions or concerns, she should not hesitate to call.    Subjective:     Karen Arthur is a 87 y.o. female who presents today for follow-up regarding chronic low back pain with radiation into the bilateral lower extremities.  Patient is following up after bilateral L3, L4, L5 radiofrequency ablation February 20, 2022.  Patient reports that she is no longer experiencing significant right-sided low back pain.  Unfortunately, left low back pain is more severe.  She denies any injury or event to cause the pain to worsen.  At first she thought the increased pain was just postprocedural soreness, but it has not improved.  She does report improvement in her leg pain.    Patient complains of left-sided low back/upper buttock pain.  Patient points to the sacroiliac joint when pointing to the pain.  Occasionally she feels some pain radiating down the left posterior lateral thigh but that is improved compared with before the injection.  She is also been complaining of some right knee pain but that does not seem related to her back.  She denies any significant right-sided low back pain today.  She rates her pain today as a 4 out of 10.  At its worst it is an 8 out of 10.  At its best it is a 1 out of 10.  Pain is aggravated with walking and standing.  Patient's   notes that she has been less active and not as mobile since his pain increased.  Pain is alleviated with lying down and sitting.    Patient went to physical therapy at [facility] for athletic medicine in [place] 2019 for her lower back.She uses Tylenol 1000 mill grams 3 times daily, nabumetone 750 mg twice daily. These medications are  helpful.        Review of Systems:  Positive for weakness. Negative for numbness/tingling, loss of bowel/bladder control, inability to urinate, headache, pain much worse at night, trip/stumble/falls, difficulty swallowing, fevers, unintentional weight loss, difficulty with hand skills.     Objective:   CONSTITUTIONAL:  Vital signs as above.  No acute distress.  The patient is well nourished and well groomed.    PSYCHIATRIC:  The patient is awake, alert, oriented to person, place and time.  The patient is answering questions appropriately with clear speech.  Normal affect.  HEENT: Normocephalic, atraumatic.  Sclera clear.    SKIN:  Skin over the face, posterior torso, bilateral upper and lower extremities is clean, dry, intact without rashes.  MUSCULOSKELETAL: Patient transitions from seated to standing independently.  Patient has a significant scoliotic deformity.   No significant tenderness palpation bilateral lower lumbar paraspinous muscles.  Significant tenderness palpation left sacroiliac joint.  Mild tenderness palpation left greater trochanter.  Patient is able to rise onto toes and heels bilaterally with support.  The patient has 5/5 strength for the bilateral hip flexors, knee flexors/extensors, ankle dorsiflexors.  Positive pelvic distraction test reproducing left SI joint pain.  Positive thigh thrust left, negative thigh thrust right.  Positive Marquis's test left reproducing left SI joint pain.  Negative Marquis's test right.  Positive Gaenslen's left, negative Gaenslen's test right.  Negative Yeomans test bilaterally.  NEUROLOGICAL: 1-2+ bilateral and Achilles patellar  reflexes.   Sensation intact to light touch bilateral lower extremities throughout.     RESULTS:  I reviewed the MRI lumbar spine from [facility] dated November 22, 2021.  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  There is multilevel lumbar spondylosis.  At T12-L1 there is severe left foraminal stenosis.  At L1-L2 there is moderate left foraminal stenosis.  At L2-3 there is moderate spinal canal stenosis with mild to moderate right and moderate to severe left foraminal stenosis.  At L3-4 there is moderate to severe spinal canal stenosis with moderate bilateral foraminal stenosis.  At L4-5 there is mild spinal canal stenosis with moderate to severe right and mild left foraminal stenosis.  At L5-S1 there is moderate to severe right foraminal stenosis.  Please see report for further details.            Again, thank you for allowing me to participate in the care of your patient.        Sincerely,        Ailin Colunga PA-C

## 2022-03-29 NOTE — PATIENT INSTRUCTIONS
A left sacroiliac joint injection has been ordered today.      Please note that this injection uses cortisone.  The cortisone may somewhat weaken the immune system.  It is unknown how much the immune system is weakened.  It is unknown if it is weakened to the point that you may be more likely to get the COVID-19 virus, or if you do get the COVID-19 virus, if you would be sicker than you would have been if you had not had the cortisone injection.  If you do not wish to proceed with the injection, please let the nurse/physician know and do NOT schedule the injection.    Please note that since your immune system is weakened from the cortisone, having any vaccine/shot may be less effective if you have this vaccine within 2 weeks from your cortisone injection.  It is advised to wait 2 weeks after your cortisone injection to have any vaccine (or if you have a vaccine first, wait 2 weeks before you have the cortisone injection).    Please schedule this injection at least 1 week  from now to allow time for insurance prior authorization.  On the day of your injection, you cannot be sick or taking antibiotics.  If you become sick and are prescribed, please call the clinic so your injection can be rescheduled for once you have completed your antibiotics.  You will need to bring a  with you for your injection.   If you have any questions or concerns prior to your injection, please do not hesitate to call the nurse navigation line at [phone] or contact Ailin Colunga through [employer].      An order for physicaltherapy has been provided today.  Someone will call you to schedule physical therapy or you can call [phone] to schedule physical therapy.  It will be very important for you to do your physical therapy exercises on aregular basis to decrease your pain and prevent future flares of pain.

## 2022-04-05 ENCOUNTER — TRANSFERRED RECORDS (OUTPATIENT)
Dept: HEALTH INFORMATION MANAGEMENT | Facility: CLINIC | Age: 87
End: 2022-04-05

## 2022-04-05 ENCOUNTER — HOSPITAL ENCOUNTER (OUTPATIENT)
Dept: PHYSICAL THERAPY | Facility: REHABILITATION | Age: 87
Discharge: HOME | End: 2022-04-05
Attending: PHYSICIAN ASSISTANT
Payer: COMMERCIAL

## 2022-04-05 DIAGNOSIS — G89.29 CHRONIC LEFT SACROILIAC JOINT PAIN: ICD-10-CM

## 2022-04-05 DIAGNOSIS — M54.41 CHRONIC BILATERAL LOW BACK PAIN WITH BILATERAL SCIATICA: ICD-10-CM

## 2022-04-05 DIAGNOSIS — G89.29 CHRONIC BILATERAL LOW BACK PAIN WITH BILATERAL SCIATICA: ICD-10-CM

## 2022-04-05 DIAGNOSIS — M53.3 CHRONIC LEFT SACROILIAC JOINT PAIN: ICD-10-CM

## 2022-04-05 DIAGNOSIS — M47.817 LUMBOSACRAL SPONDYLOSIS WITHOUT MYELOPATHY: ICD-10-CM

## 2022-04-05 DIAGNOSIS — M47.816 LUMBAR FACET ARTHROPATHY: ICD-10-CM

## 2022-04-05 DIAGNOSIS — M54.42 CHRONIC BILATERAL LOW BACK PAIN WITH BILATERAL SCIATICA: ICD-10-CM

## 2022-04-05 DIAGNOSIS — M47.816 LUMBAR FACET JOINT SYNDROME: ICD-10-CM

## 2022-04-05 PROCEDURE — 97161 PT EVAL LOW COMPLEX 20 MIN: CPT | Mod: GP

## 2022-04-05 PROCEDURE — 97110 THERAPEUTIC EXERCISES: CPT | Mod: GP

## 2022-04-06 NOTE — PROGRESS NOTES
04/05/22 1400   General Information   Type of Visit Initial OP Ortho PT Evaluation   Start of Care Date 04/05/22   Referring Physician Ailin Colunga PA-C   Orders Evaluate and Treat   Date of Order 03/29/22   Certification Required? No   Medical Diagnosis M53.3, G89.29 (ICD-10-CM) - Chronic left sacroiliac joint pain   Surgical/Medical history reviewed Yes   Body Part(s)   Body Part(s) Hip;Lumbar Spine/SI   Presentation and Etiology   Pertinent history of current problem (include personal factors and/or comorbidities that impact the POC) Patient has been having L sided low back and SIJ pain. Reports insidious onset with no MINA. Has issues with prolonged positioning, sitting, standing, walking, bending, lifting, and carrying. She also has issues with dressing, cooking, and cleaning. She has had PT for this issue in the past, and continues to work on her exercises. She did have an ablation which helped some of the radiating pain, but she does get some pain into the L buttock region still. Sleeping goes OK generally. She has good days and bad days, not sure what sets off increased instances of pain. She will occasionally use a cane, especially on days when she is in more pain.    Impairments C. Swelling;D. Decreased ROM;A. Pain;F. Decreased strength and endurance;G. Impaired balance;H. Impaired gait   Functional Limitations perform activities of daily living;perform desired leisure / sports activities   Symptom Location L low back, SIJ    How/Where did it occur With repetition/overuse;From insidious onset   Onset date of current episode/exacerbation 03/29/22   Chronicity Chronic   Pain rating (0-10 point scale) Best (/10);Worst (/10)   Best (/10) 1   Worst (/10) 8   Pain quality B. Dull;C. Aching;E. Shooting   Frequency of pain/symptoms A. Constant   Fall Risk Screen   Fall screen completed by PT   Have you fallen 2 or more times in the past year? Yes   Have you fallen and had an injury in the past year? No   Abuse  Screen (yes response referral indicated)   Feels Unsafe at Home or Work/School no   Feels Threatened by Someone no   Does Anyone Try to Keep You From Having Contact with Others or Doing Things Outside Your Home? no   Physical Signs of Abuse Present no   System Outcome Measures   Outcome Measures   (LEFS: )   Lumbar Spine/SI Objective Findings   Observation Forward posture, slow gait   Integumentary No deficits noted   Posture Forward head, forward shoulders, thoracic kyphosis, thoracic scoliosis with L convex curvature   Gait/Locomotion hunched posture, slow gait speed, no AD used, increased time in DL support    Balance/Proprioception (Single Leg Stance) Rhomber seconds with CGA for safety; 30 sec sit<>stands: 7 repetitions   Flexion ROM Fingertips to toes with one hand on countertop for balance, stiffness through the low back, [place]'s sign to return to neutral standing    Extension ROM mod loss with mild pain   Right Side Bending ROM min loss    Left Side Bending ROM mod loss with stiffness    Lumbar ROM Comment Limited d/t stiffness and balance    Pelvic Screen L iliac crest higher than R due to scoliotic curve   Hip Abduction Strength Able to complete 7 sidelying SLR on L, 10 on R with fatigue    Hip Extension Strength Completed 8 bridges to fatigue    Knee Extension (L3) Strength 7 sit to stands in 30 seconds    Lumbar/Hip/Knee/Foot Strength Comments Formal MMT deferred due to osteporosis    Hamstring Flexibility Increased tightness R    Piriformis Flexibility Increased tightness R    SLR 55 deg B    Segmental Mobility Tightness to the L SIJ with PA mobilization; decreased spring throughout lumbar spine   Sensation Testing Intact through dermatomal pattern    Palpation Sig tenderness to the B SIJ, mild tenderness to the B lumbar paraspinals and gluteals    Planned Therapy Interventions   Planned Therapy Interventions ADL retraining;balance training;gait training;joint mobilization;manual  therapy;neuromuscular re-education;ROM;strengthening;stretching   Planned Modality Interventions   Planned Modality Interventions Biofeedback;Cryotherapy;Electrical stimulation;Hot packs;TENS;Traction;Ultrasound   Clinical Impression   Criteria for Skilled Therapeutic Interventions Met yes, treatment indicated   PT Diagnosis B SIJ and Lumbar Pain; generalized weakness, decreased trunk and hip ROM; balance and gait impairment   Functional limitations due to impairments Standing, walking, bending, twisting, lifting, dressing, bathing   Clinical Presentation Stable/Uncomplicated   Clinical Decision Making (Complexity) Low complexity   Therapy Frequency 1 time/week   Predicted Duration of Therapy Intervention (days/wks) 10 weeks   Risk & Benefits of therapy have been explained Yes   Patient, Family & other staff in agreement with plan of care Yes   Clinical Impression Comments Patient is a 87 year old female with a relevant PMH involving osteoporosis, scoliosis, memory loss, and OA. She reports to PT with chronic low back and SIJ pain. She continues to be as active as possible with daily exercise which has continued to minimize the low back and hip discomfort. Functionally, patient has difficulty with upright activities including, bending, lifting, transfers, and walking. She is appropriate to begin skilled PT care to address limitations noted in the IE.    Education Assessment   Preferred Learning Style Demonstration;Pictures/video   Barriers to Learning Cognitive  (Memory )   ORTHO GOALS   PT Ortho Eval Goals 1;2;3;4   Ortho Goal 1   Goal Identifier LEFS   Goal Description Patient will improve LEFS score by at least 9 points to show improvement in function in the presence of hip pain.    Target Date 06/15/22   Ortho Goal 2   Goal Identifier Standing   Goal Description Patient will improve standing tolerance to at least 20 minutes to improve ability in completing upright daily tasks.    Target Date 06/15/22   Ortho  Goal 3   Goal Identifier 30 sec sit<>stand   Goal Description Patient will improve Sit<>stand score to at least 10 to improve LE strength and decrease risk for falls.    Target Date 06/15/22   Ortho Goal 4   Goal Identifier Pain   Goal Description Patient will report no greater than 3/10 pain to improve QoL.    Target Date 06/15/22   Total Evaluation Time   PT [place], Low Complexity Minutes ([id number]) 28     Scott Parmer, PT, DPT

## 2022-04-12 ENCOUNTER — RADIOLOGY INJECTION OFFICE VISIT (OUTPATIENT)
Dept: PHYSICAL MEDICINE AND REHAB | Facility: CLINIC | Age: 87
End: 2022-04-12
Attending: PHYSICIAN ASSISTANT
Payer: COMMERCIAL

## 2022-04-12 VITALS
RESPIRATION RATE: 16 BRPM | TEMPERATURE: 97.4 F | DIASTOLIC BLOOD PRESSURE: 72 MMHG | HEART RATE: 64 BPM | SYSTOLIC BLOOD PRESSURE: 152 MMHG | OXYGEN SATURATION: 96 %

## 2022-04-12 DIAGNOSIS — M53.3 CHRONIC LEFT SACROILIAC JOINT PAIN: ICD-10-CM

## 2022-04-12 DIAGNOSIS — G89.29 CHRONIC LEFT SACROILIAC JOINT PAIN: ICD-10-CM

## 2022-04-12 PROCEDURE — 27096 INJECT SACROILIAC JOINT: CPT | Mod: LT | Performed by: PHYSICAL MEDICINE & REHABILITATION

## 2022-04-12 RX ORDER — METHYLPREDNISOLONE ACETATE 40 MG/ML
INJECTION, SUSPENSION INTRA-ARTICULAR; INTRALESIONAL; INTRAMUSCULAR; SOFT TISSUE
Status: COMPLETED | OUTPATIENT
Start: 2022-04-12 | End: 2022-04-12

## 2022-04-12 RX ORDER — ROPIVACAINE HYDROCHLORIDE 5 MG/ML
INJECTION, SOLUTION EPIDURAL; INFILTRATION; PERINEURAL
Status: COMPLETED | OUTPATIENT
Start: 2022-04-12 | End: 2022-04-12

## 2022-04-12 RX ADMIN — METHYLPREDNISOLONE ACETATE 40 MG: 40 INJECTION, SUSPENSION INTRA-ARTICULAR; INTRALESIONAL; INTRAMUSCULAR; SOFT TISSUE at 13:15

## 2022-04-12 RX ADMIN — ROPIVACAINE HYDROCHLORIDE 2 ML: 5 INJECTION, SOLUTION EPIDURAL; INFILTRATION; PERINEURAL at 13:15

## 2022-04-12 ASSESSMENT — PAIN SCALES - GENERAL
PAINLEVEL: MODERATE PAIN (4)
PAINLEVEL: MILD PAIN (3)

## 2022-04-12 NOTE — PATIENT INSTRUCTIONS
Follow-up visit with OMEGA Kong in 2 weeks to discuss injection outcome and determine care plan going forward.       DISCHARGE INSTRUCTIONS    During office hours (8:00 a.m.- 4:00 p.m.) questions or concerns may be answered  by calling Spine Center Navigation Nurses at  [phone].  Messages received after hours will be returned the following business day.      In the case of an emergency, please dial 911 or seek assistance at the nearest Emergency Room/Urgent Care facility.     All Patients:    You may experience an increase in your symptoms for the first 2 days (It may take anywhere between 2 days- 2 weeks for the steroid to have maximum effect).    You may use ice on the injection site, as frequently as 20 minutes each hour if needed.    You may take your pain medicine.    You may continue taking your regular medication after your injection. If you have had a Medial Branch Block you may resume pain medication once your pain diary is completed.    You may shower. No swimming, tub bath or hot tub for 48 hours.  You may remove your bandaid/bandage as soon as you are home.    You may resume light activities, as tolerated.    Resume your usual diet as tolerated.    It is strongly advised that you do not drive for 1-3 hours post injection.    If you have had oral sedation:  Do not drive for 8 hours post injection.      If you have had IV sedation:  Do not drive for 24 hours post injection.  Do not operate hazardous machinery or make important personal/business decisions for 24 hours.      POSSIBLE STEROID SIDE EFFECTS (If steroid/cortisone was used for your procedure)    -If you experience these symptoms, it should only last for a short period    Swelling of the legs              Skin redness (flushing)     Mouth (oral) irritation   Blood sugar (glucose) levels            Sweats                    Mood changes  Headache  Sleeplessness  Weakened immune system for up to 14 days, which could increase the risk of  mable the COVID-19 virus and/or experiencing more severe symptoms of the disease, if exposed.  Decreased effectiveness of the flu vaccine if given within 2 weeks of the steroid.         POSSIBLE PROCEDURE SIDE EFFECTS  -Call the Spine Center if you are concerned  Increased Pain           Increased numbness/tingling      Nausea/Vomiting          Bruising/bleeding at site      Redness or swelling                                              Difficulty walking      Weakness           Fever greater than 100.5    *In the event of a severe headache after an epidural steroid injection that is relieved by lying down, please call the [facility] to speak with a clinical staff member*

## 2022-04-13 ENCOUNTER — TRANSFERRED RECORDS (OUTPATIENT)
Dept: HEALTH INFORMATION MANAGEMENT | Facility: CLINIC | Age: 87
End: 2022-04-13
Payer: COMMERCIAL

## 2022-04-25 NOTE — PROGRESS NOTES
Assessment:   Karen Arthur is a 87 y.o. y.o. female with past medical history significant for hypothyroidism, hyperlipidemia, vitamin D deficiency, restless leg syndrome, dementia, hypertension, GERD, nonischemic cardiomyopathy, osteoporosis who presents today for follow-up regarding chronic low back pain and bilateral lower extremity pain.   My review of an MRI lumbar spine shows significant dextroconvex scoliosis with multilevel high-grade spinal canal and neural foraminal stenosis.  - Patient is status post a left sacroiliac joint injection April 12, 2022 which has provided significant improvement in her pain and significant improvement in her function.  Patient is unable to give me an exact percentage today due to her dementia.  -Patient previously underwent bilateral L3, L4, L5 radiofrequency ablation February 20, 2022 which continues to provide significant relief of her right-sided pain but it did not provide any relief of her left-sided pain.       Plan:     A shared decision making plan was used.  The patient's values and choices were respected.  The following represents what was discussed and decided upon by the physician assistant and the patient.  Patient is accompanied by her  who her son was on speaker phone.  They helped to provide history since she has dementia.    1.  DIAGNOSTIC TESTS: I reviewed the MRI lumbar spine.  No further diagnostic tests were ordered.      2.  PHYSICAL THERAPY: Patient is currently in physical therapy.  She has had 1 session so far and has more sessions scheduled.  Encouraged her to continue with the physical therapy program and home exercises.    3.  MEDICATIONS: No changes are made to the patient's medications.  She uses Tylenol 1000 milligrams 3 times daily, nabumetone 750 mg twice daily. She has tried topical pain relievers but they were not helpful.    4.  INTERVENTIONS: No additional interventions were ordered.  For the left-sided pain we could repeat the left  SI joint injections if needed.  We could also repeat radiofrequency ablation on the right if needed.      5.  PATIENT EDUCATION:    -Patient is in agreement the above plan.  All questions were answered.    6.  FOLLOW-UP: Patient will follow up with me as needed.  If she has questions or concerns, she should not hesitate to call.    Subjective:     Karen Arthur is a 87 y.o. female who presents today for follow-up regarding chronic low back pain with radiation into the bilateral lower extremities.  Patient is following up after a left sacroiliac joint injection April 12, 2022.  Patient reports significant improvement in her pain.  She is unable to give me an exact percentage because of her dementia.  Her  notes that her function has significantly improved.  She is able to be up standing and walking longer.  She moves around more easily.  Her son also notes that she is not having any of the severe level of pain that she had before her injections.    Patient complains of only mild residual bilateral low back/upper buttock pain.  She denies any pain down the legs.  Denies any numbness, tingling, weakness.  She rates her pain today as a 3-5 out of 10.  At its worst it is a 7 out of 10.  At its best it is a 1 out of 10.  She denies any aggravating factors to the pain.  Pain is alleviated with sitting and lying down.  She denies any new symptoms since she was last seen.    Patient is currently in physical therapy.  She has had 1 session so far and has more sessions scheduled.  She can tolerate 2 of the 3 exercises prescribed.  She does not tolerate the piriformis stretch.  She uses Tylenol 1000 mg 3 times daily, nabumetone 750 mg twice daily. These medications are  helpful.        Review of Systems:  Negative for numbness/tingling, weakness, loss of bowel/bladder control, inability to urinate, headache, pain much worse at night, trip/stumble/falls, difficulty swallowing, fevers, unintentional weight loss,  difficulty with hand skills.     Objective:   CONSTITUTIONAL:  Vital signs as above.  No acute distress.  The patient is well nourished and well groomed.    PSYCHIATRIC:  The patient is awake, alert, oriented to person, place and time.  Patient is confused.  HEENT: Normocephalic, atraumatic.  Sclera clear.    SKIN:  Skin over the face, posterior torso, bilateral upper and lower extremities is clean, dry, intact without rashes.  MUSCULOSKELETAL: Patient transitions from seated to standing independently.  Patient has a significant scoliotic deformity.   Minimal tenderness to palpation bilateral lower lumbar paraspinous muscles and bilateral sacroiliac joints.  Patient is able to rise onto toes and heels bilaterally with support.  The patient has 5/5 strength for the bilateral hip flexors, knee flexors/extensors, ankle dorsiflexors.    NEUROLOGICAL: 1-2+ bilateral patellar reflexes.   Sensation intact to light touch bilateral lower extremities throughout.     RESULTS:  I reviewed the MRI lumbar spine from [facility] dated November 22, 2021.  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  There is multilevel lumbar spondylosis.  At T12-L1 there is severe left foraminal stenosis.  At L1-L2 there is moderate left foraminal stenosis.  At L2-3 there is moderate spinal canal stenosis with mild to moderate right and moderate to severe left foraminal stenosis.  At L3-4 there is moderate to severe spinal canal stenosis with moderate bilateral foraminal stenosis.  At L4-5 there is mild spinal canal stenosis with moderate to severe right and mild left foraminal stenosis.  At L5-S1 there is moderate to severe right foraminal stenosis.  Please see report for further details.

## 2022-04-26 ENCOUNTER — OFFICE VISIT (OUTPATIENT)
Dept: PHYSICAL MEDICINE AND REHAB | Facility: CLINIC | Age: 87
End: 2022-04-26
Payer: COMMERCIAL

## 2022-04-26 VITALS
SYSTOLIC BLOOD PRESSURE: 105 MMHG | WEIGHT: 123 LBS | HEIGHT: 63 IN | BODY MASS INDEX: 21.79 KG/M2 | HEART RATE: 62 BPM | DIASTOLIC BLOOD PRESSURE: 56 MMHG

## 2022-04-26 DIAGNOSIS — M53.3 CHRONIC LEFT SACROILIAC JOINT PAIN: Primary | ICD-10-CM

## 2022-04-26 DIAGNOSIS — G89.29 CHRONIC LEFT SACROILIAC JOINT PAIN: Primary | ICD-10-CM

## 2022-04-26 DIAGNOSIS — M47.816 LUMBAR FACET ARTHROPATHY: ICD-10-CM

## 2022-04-26 PROCEDURE — 99213 OFFICE O/P EST LOW 20 MIN: CPT | Performed by: PHYSICIAN ASSISTANT

## 2022-04-26 ASSESSMENT — PAIN SCALES - GENERAL: PAINLEVEL: MODERATE PAIN (4)

## 2022-04-26 NOTE — LETTER
4/26/2022         RE: Karen Arthur  [address]  [facility] [address]        Dear Colleague,    Thank you for referring your patient, Karen Arthur, to the [facility]ERY. Please see a copy of my visit note below.    Assessment:   Karen Arthur is a 87 y.o. y.o. female with past medical history significant for hypothyroidism, hyperlipidemia, vitamin D deficiency, restless leg syndrome, dementia, hypertension, GERD, nonischemic cardiomyopathy, osteoporosis who presents today for follow-up regarding chronic low back pain and bilateral lower extremity pain.   My review of an MRI lumbar spine shows significant dextroconvex scoliosis with multilevel high-grade spinal canal and neural foraminal stenosis.  - Patient is status post a left sacroiliac joint injection April 12, 2022 which has provided significant improvement in her pain and significant improvement in her function.  Patient is unable to give me an exact percentage today due to her dementia.  -Patient previously underwent bilateral L3, L4, L5 radiofrequency ablation February 20, 2022 which continues to provide significant relief of her right-sided pain but it did not provide any relief of her left-sided pain.       Plan:     A shared decision making plan was used.  The patient's values and choices were respected.  The following represents what was discussed and decided upon by the physician assistant and the patient.  Patient is accompanied by her  who her son was on speaker phone.  They helped to provide history since she has dementia.    1.  DIAGNOSTIC TESTS: I reviewed the MRI lumbar spine.  No further diagnostic tests were ordered.      2.  PHYSICAL THERAPY: Patient is currently in physical therapy.  She has had 1 session so far and has more sessions scheduled.  Encouraged her to continue with the physical therapy program and home exercises.    3.  MEDICATIONS: No changes are made to the patient's medications.   She uses Tylenol 1000 milligrams 3 times daily, nabumetone 750 mg twice daily. She has tried topical pain relievers but they were not helpful.    4.  INTERVENTIONS: No additional interventions were ordered.  For the left-sided pain we could repeat the left SI joint injections if needed.  We could also repeat radiofrequency ablation on the right if needed.      5.  PATIENT EDUCATION:    -Patient is in agreement the above plan.  All questions were answered.    6.  FOLLOW-UP: Patient will follow up with me as needed.  If she has questions or concerns, she should not hesitate to call.    Subjective:     Karen Arthur is a 87 y.o. female who presents today for follow-up regarding chronic low back pain with radiation into the bilateral lower extremities.  Patient is following up after a left sacroiliac joint injection April 12, 2022.  Patient reports significant improvement in her pain.  She is unable to give me an exact percentage because of her dementia.  Her  notes that her function has significantly improved.  She is able to be up standing and walking longer.  She moves around more easily.  Her son also notes that she is not having any of the severe level of pain that she had before her injections.    Patient complains of only mild residual bilateral low back/upper buttock pain.  She denies any pain down the legs.  Denies any numbness, tingling, weakness.  She rates her pain today as a 3-5 out of 10.  At its worst it is a 7 out of 10.  At its best it is a 1 out of 10.  She denies any aggravating factors to the pain.  Pain is alleviated with sitting and lying down.  She denies any new symptoms since she was last seen.    Patient is currently in physical therapy.  She has had 1 session so far and has more sessions scheduled.  She can tolerate 2 of the 3 exercises prescribed.  She does not tolerate the piriformis stretch.  She uses Tylenol 1000 mg 3 times daily, nabumetone 750 mg twice daily. These medications  are  helpful.        Review of Systems:  Negative for numbness/tingling, weakness, loss of bowel/bladder control, inability to urinate, headache, pain much worse at night, trip/stumble/falls, difficulty swallowing, fevers, unintentional weight loss, difficulty with hand skills.     Objective:   CONSTITUTIONAL:  Vital signs as above.  No acute distress.  The patient is well nourished and well groomed.    PSYCHIATRIC:  The patient is awake, alert, oriented to person, place and time.  Patient is confused.  HEENT: Normocephalic, atraumatic.  Sclera clear.    SKIN:  Skin over the face, posterior torso, bilateral upper and lower extremities is clean, dry, intact without rashes.  MUSCULOSKELETAL: Patient transitions from seated to standing independently.  Patient has a significant scoliotic deformity.   Minimal tenderness to palpation bilateral lower lumbar paraspinous muscles and bilateral sacroiliac joints.  Patient is able to rise onto toes and heels bilaterally with support.  The patient has 5/5 strength for the bilateral hip flexors, knee flexors/extensors, ankle dorsiflexors.    NEUROLOGICAL: 1-2+ bilateral patellar reflexes.   Sensation intact to light touch bilateral lower extremities throughout.     RESULTS:  I reviewed the MRI lumbar spine from [facility] dated November 22, 2021.  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  There is multilevel lumbar spondylosis.  At T12-L1 there is severe left foraminal stenosis.  At L1-L2 there is moderate left foraminal stenosis.  At L2-3 there is moderate spinal canal stenosis with mild to moderate right and moderate to severe left foraminal stenosis.  At L3-4 there is moderate to severe spinal canal stenosis with moderate bilateral foraminal stenosis.  At L4-5 there is mild spinal canal stenosis with moderate to severe right and mild left foraminal stenosis.  At L5-S1 there is moderate to severe right  foraminal stenosis.  Please see report for further details.            Again, thank you for allowing me to participate in the care of your patient.        Sincerely,        Ailin Colunga PA-C

## 2022-04-26 NOTE — PATIENT INSTRUCTIONS
I am so happy that you are feeling better.  We can repeat the left sacroiliac joint injection up to 4 times per year if needed.  We can also repeat the radiofrequency ablation up to every 6 months if needed.  Please send me a LiveOffice message or call us if you have any questions or concerns

## 2022-05-25 ENCOUNTER — TELEPHONE (OUTPATIENT)
Dept: INTERNAL MEDICINE | Facility: CLINIC | Age: 87
End: 2022-05-25
Payer: COMMERCIAL

## 2022-05-25 NOTE — TELEPHONE ENCOUNTER
Please start PA for Lansoprazole 30 mg daily Key is [id number] and pharmacy is [facility].

## 2022-05-31 NOTE — TELEPHONE ENCOUNTER
Prior Authorization Approval    Authorization Effective Date: 5/1/2022  Authorization Expiration Date: 5/31/2023  Medication: LANsoprazole (PREVACID) 30 MG DR capsule  Approved Dose/Quantity:   Reference #: [id number]   Insurance Company: [employer] - Phone [phone] Fax [phone]  Which Pharmacy is filling the prescription (Not needed for infusion/clinic administered): [facility] #[id number] - [place], [place] - [facility]  Pharmacy Notified: Yes  Patient Notified: Yes

## 2022-06-08 ENCOUNTER — HOSPITAL ENCOUNTER (OUTPATIENT)
Dept: PHYSICAL THERAPY | Facility: REHABILITATION | Age: 87
Discharge: HOME | End: 2022-06-08
Payer: COMMERCIAL

## 2022-06-08 DIAGNOSIS — M54.42 CHRONIC BILATERAL LOW BACK PAIN WITH BILATERAL SCIATICA: Primary | ICD-10-CM

## 2022-06-08 DIAGNOSIS — M54.41 CHRONIC BILATERAL LOW BACK PAIN WITH BILATERAL SCIATICA: Primary | ICD-10-CM

## 2022-06-08 DIAGNOSIS — M47.817 LUMBOSACRAL SPONDYLOSIS WITHOUT MYELOPATHY: ICD-10-CM

## 2022-06-08 DIAGNOSIS — G89.29 CHRONIC LEFT SACROILIAC JOINT PAIN: ICD-10-CM

## 2022-06-08 DIAGNOSIS — M47.816 LUMBAR FACET JOINT SYNDROME: ICD-10-CM

## 2022-06-08 DIAGNOSIS — M47.816 LUMBAR FACET ARTHROPATHY: ICD-10-CM

## 2022-06-08 DIAGNOSIS — M53.3 CHRONIC LEFT SACROILIAC JOINT PAIN: ICD-10-CM

## 2022-06-08 DIAGNOSIS — G89.29 CHRONIC BILATERAL LOW BACK PAIN WITH BILATERAL SCIATICA: Primary | ICD-10-CM

## 2022-06-08 PROCEDURE — 97110 THERAPEUTIC EXERCISES: CPT | Mod: GP

## 2022-06-08 PROCEDURE — 97140 MANUAL THERAPY 1/> REGIONS: CPT | Mod: GP

## 2022-07-13 ENCOUNTER — HOSPITAL ENCOUNTER (OUTPATIENT)
Dept: PHYSICAL THERAPY | Facility: REHABILITATION | Age: 87
Discharge: HOME | End: 2022-07-13
Payer: COMMERCIAL

## 2022-07-13 DIAGNOSIS — G89.29 CHRONIC LEFT SACROILIAC JOINT PAIN: ICD-10-CM

## 2022-07-13 DIAGNOSIS — G89.29 CHRONIC BILATERAL LOW BACK PAIN WITH BILATERAL SCIATICA: Primary | ICD-10-CM

## 2022-07-13 DIAGNOSIS — M47.816 LUMBAR FACET JOINT SYNDROME: ICD-10-CM

## 2022-07-13 DIAGNOSIS — M54.42 CHRONIC BILATERAL LOW BACK PAIN WITH BILATERAL SCIATICA: Primary | ICD-10-CM

## 2022-07-13 DIAGNOSIS — M47.817 LUMBOSACRAL SPONDYLOSIS WITHOUT MYELOPATHY: ICD-10-CM

## 2022-07-13 DIAGNOSIS — M53.3 CHRONIC LEFT SACROILIAC JOINT PAIN: ICD-10-CM

## 2022-07-13 DIAGNOSIS — M54.41 CHRONIC BILATERAL LOW BACK PAIN WITH BILATERAL SCIATICA: Primary | ICD-10-CM

## 2022-07-13 DIAGNOSIS — M47.816 LUMBAR FACET ARTHROPATHY: ICD-10-CM

## 2022-07-13 PROCEDURE — 97140 MANUAL THERAPY 1/> REGIONS: CPT | Mod: GP

## 2022-07-13 PROCEDURE — 97110 THERAPEUTIC EXERCISES: CPT | Mod: GP

## 2022-07-13 NOTE — PROGRESS NOTES
[facility]    Outpatient Physical Therapy Discharge Note  Patient: Karen Arthur  : 1935    Beginning/End Dates of Reporting Period:  2022 to 2022      Referring Provider: Ailin Colunga PA-C    Therapy Diagnosis: B low back pain; generalized weakness     Client Self Report: Patient reports some more discomfort today through the low back.        Goals:  Goal Identifier LEFS   Goal Description Patient will improve LEFS score by at least 9 points to show improvement in function in the presence of hip pain.    Target Date 06/15/22   Date Met      Progress (detail required for progress note):       Goal Identifier Standing   Goal Description Patient will improve standing tolerance to at least 20 minutes to improve ability in completing upright daily tasks.    Target Date 06/15/22   Date Met      Progress (detail required for progress note):  MET     Goal Identifier 30 sec sit<>stand   Goal Description Patient will improve Sit<>stand score to at least 10 to improve LE strength and decrease risk for falls.    Target Date 06/15/22   Date Met      Progress (detail required for progress note):       Goal Identifier Pain   Goal Description Patient will report no greater than 3/10 pain to improve QoL.    Target Date 06/15/22   Date Met      Progress (detail required for progress note):  MET         Plan:  Discharge from therapy.    Discharge:    Reason for Discharge: Patient chooses to discontinue therapy.    Equipment Issued: n/a    Discharge Plan: Patient to continue home program.    Scott Parmer, PT

## 2022-09-18 ENCOUNTER — HEALTH MAINTENANCE LETTER (OUTPATIENT)
Age: 87
End: 2022-09-18

## 2022-10-24 NOTE — PROGRESS NOTES
Assessment:   Karen Arthur is a 87 y.o. y.o. female with past medical history significant for hypothyroidism, hyperlipidemia, vitamin D deficiency, restless leg syndrome, dementia, hypertension, GERD, nonischemic cardiomyopathy, osteoporosis who presents today for follow-up regarding chronic low back pain currently more severe on the left with left lateral hip and groin pain.   My review of an MRI lumbar spine shows significant dextroconvex scoliosis with multilevel high-grade spinal canal and neural foraminal stenosis.  There is also suggestion of left hip arthritis.  Patient had a CT abdomen and pelvis August 2022 which did not show any other etiology for left pelvic/groin pain.  - Patient is status post a left sacroiliac joint injection April 12, 2022 which her  states did not provide any meaningful relief of her pain.  -Patient previously underwent bilateral L3, L4, L5 radiofrequency ablation February 20, 2022 which continues to provide significant relief of her right-sided pain but it did not provide any relief of her left-sided pain.       Plan:     A shared decision making plan was used.  The patient's values and choices were respected.  The following represents what was discussed and decided upon by the physician assistant and the patient.  Patient has dementia so most of the history was provided by her  and son.    1.  DIAGNOSTIC TESTS: I reviewed the MRI lumbar spine.  I also reviewed the report of the CT abdomen and pelvis from [facility] from August 3, 2022.  No further diagnostic tests were ordered.      2.  PHYSICAL THERAPY: Patient completed 3 sessions of physical therapy July 13, 2022.  Patient is unable to retain the information given by the physical therapist in order to continue to do the home exercises on her own due to her dementia.  No further physical therapy was ordered.    3.  MEDICATIONS: No changes are made to the patient's medications.  She uses Tylenol 1000 milligrams 3 times  daily, nabumetone 750 mg twice daily. She has tried topical pain relievers but they were not helpful.    4.  INTERVENTIONS:   -We discussed a left hip joint injection under ultrasound guidance as the next step.  Over the past 1 week, patient's pain has actually improved so they are can hold off on scheduling for now.  However, if pain worsens again, I would recommend this as the next step.    5.  PATIENT EDUCATION:    -Patient is in agreement the above plan.  All questions were answered.    6.  FOLLOW-UP: Patient will follow up with me as needed.  If she has questions or concerns, she should not hesitate to call.    Subjective:     Karen Arthur is a 87 y.o. female who presents today for follow-up regarding chronic low back pain, currently on the left with a 3-month history of increasing pain in the left groin and lateral hip.  This pain was severe in August and they were concerned it may be related to constipation.  They went to urgent care through [facility].  A CT abdomen pelvis was obtained which did not show any acute abnormality to explain the pain.  Patient's  and son note that she has good days and bad days with her pain and it is difficult to find any pattern for her pain level.  Last week pain flared up.  Since then, it has improved.    Patient complains of pain in the left groin, lateral hip/lateral thigh, and left buttock.  Family notes that some days she is able to walk without her cane but other days pain is severe and she relies on her cane.  She often holds onto her left lateral hip and applies manual pressure to provide some relief of her pain.  She was able to walk on a path in the park over the weekend without significant difficulty.  It did seem like she became more sore a couple of days after this activity and needed some time to recover but by the next day she was feeling good again.  Today she rates her pain as a 1 out of 10.    Patient completed 3 sessions of physical therapy July 13,  2022.  She is unable to do the home exercises on her own because she does not remember them.  She uses Tylenol 1000 mg 3 times daily, nabumetone 750 mg twice daily. These medications are  helpful.        Review of Systems:  Negative for numbness/tingling, weakness, loss of bowel/bladder control, inability to urinate, headache, pain much worse at night, trip/stumble/falls, difficulty swallowing, fevers, unintentional weight loss, difficulty with hand skills.     Objective:   CONSTITUTIONAL:  Vital signs as above.  No acute distress.  The patient is well nourished and well groomed.    PSYCHIATRIC:  The patient is awake, alert.  Patient is confused.  HEENT: Normocephalic, atraumatic.  Sclera clear.    SKIN:  Skin over the face, posterior torso, bilateral upper and lower extremities is clean, dry, intact without rashes.  MUSCULOSKELETAL: Patient transitions from seated to standing independently.  Patient has a significant scoliotic deformity.   No significant tenderness palpation lumbar paraspinous muscles.  Mild tenderness palpation right sacroiliac joint.  Minimal tenderness palpation left greater trochanter.  Tender to palpation left groin.  Patient is able to rise onto toes and heels bilaterally with support.  The patient has 5/5 strength for the bilateral hip flexors, knee flexors/extensors, ankle dorsiflexors.  Range of motion of both hips is within normal limits.   NEUROLOGICAL:  Sensation intact to light touch bilateral lower extremities throughout.  No ankle clonus.     RESULTS:  I reviewed the MRI lumbar spine from [facility] dated November 22, 2021.  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  There is multilevel lumbar spondylosis.  At T12-L1 there is severe left foraminal stenosis.  At L1-L2 there is moderate left foraminal stenosis.  At L2-3 there is moderate spinal canal stenosis with mild to moderate right and moderate to severe left  foraminal stenosis.  At L3-4 there is moderate to severe spinal canal stenosis with moderate bilateral foraminal stenosis.  At L4-5 there is mild spinal canal stenosis with moderate to severe right and mild left foraminal stenosis.  At L5-S1 there is moderate to severe right foraminal stenosis.  Please see report for further details.

## 2022-10-26 ENCOUNTER — OFFICE VISIT (OUTPATIENT)
Dept: PHYSICAL MEDICINE AND REHAB | Facility: CLINIC | Age: 87
End: 2022-10-26
Payer: COMMERCIAL

## 2022-10-26 VITALS
BODY MASS INDEX: 21.79 KG/M2 | DIASTOLIC BLOOD PRESSURE: 58 MMHG | HEIGHT: 63 IN | HEART RATE: 71 BPM | WEIGHT: 123 LBS | SYSTOLIC BLOOD PRESSURE: 119 MMHG

## 2022-10-26 DIAGNOSIS — M41.9 SCOLIOSIS OF THORACOLUMBAR SPINE, UNSPECIFIED SCOLIOSIS TYPE: ICD-10-CM

## 2022-10-26 DIAGNOSIS — M25.552 HIP PAIN, LEFT: Primary | ICD-10-CM

## 2022-10-26 PROCEDURE — 99213 OFFICE O/P EST LOW 20 MIN: CPT | Performed by: PHYSICIAN ASSISTANT

## 2022-10-26 ASSESSMENT — PAIN SCALES - GENERAL: PAINLEVEL: NO PAIN (1)

## 2022-10-26 NOTE — PATIENT INSTRUCTIONS
We talked about trying a left hip joint injection (cortisone shot) today.  There does appear to be some arthritis in the left hip joint on your imaging studies.  Hip arthritis can cause pain in the groin, thigh, and buttock.  If the pain worsens, please call our nurse line at [phone] or send me a [place] message and we can assist with scheduling the left hip joint injection.

## 2022-10-26 NOTE — LETTER
10/26/2022         RE: Karen Arthur  [address]  [facility] [address]        Dear Colleague,    Thank you for referring your patient, Karen Arthur, to the [facility] SPINE AND NEUROSURGERY. Please see a copy of my visit note below.    Assessment:   Karen Arthur is a 87 y.o. y.o. female with past medical history significant for hypothyroidism, hyperlipidemia, vitamin D deficiency, restless leg syndrome, dementia, hypertension, GERD, nonischemic cardiomyopathy, osteoporosis who presents today for follow-up regarding chronic low back pain currently more severe on the left with left lateral hip and groin pain.   My review of an MRI lumbar spine shows significant dextroconvex scoliosis with multilevel high-grade spinal canal and neural foraminal stenosis.  There is also suggestion of left hip arthritis.  Patient had a CT abdomen and pelvis August 2022 which did not show any other etiology for left pelvic/groin pain.  - Patient is status post a left sacroiliac joint injection April 12, 2022 which her  states did not provide any meaningful relief of her pain.  -Patient previously underwent bilateral L3, L4, L5 radiofrequency ablation February 20, 2022 which continues to provide significant relief of her right-sided pain but it did not provide any relief of her left-sided pain.       Plan:     A shared decision making plan was used.  The patient's values and choices were respected.  The following represents what was discussed and decided upon by the physician assistant and the patient.  Patient has dementia so most of the history was provided by her  and son.    1.  DIAGNOSTIC TESTS: I reviewed the MRI lumbar spine.  I also reviewed the report of the CT abdomen and pelvis from [facility] from August 3, 2022.  No further diagnostic tests were ordered.      2.  PHYSICAL THERAPY: Patient completed 3 sessions of physical therapy July 13, 2022.  Patient is unable to retain the information given  by the physical therapist in order to continue to do the home exercises on her own due to her dementia.  No further physical therapy was ordered.    3.  MEDICATIONS: No changes are made to the patient's medications.  She uses Tylenol 1000 milligrams 3 times daily, nabumetone 750 mg twice daily. She has tried topical pain relievers but they were not helpful.    4.  INTERVENTIONS:   -We discussed a left hip joint injection under ultrasound guidance as the next step.  Over the past 1 week, patient's pain has actually improved so they are can hold off on scheduling for now.  However, if pain worsens again, I would recommend this as the next step.    5.  PATIENT EDUCATION:    -Patient is in agreement the above plan.  All questions were answered.    6.  FOLLOW-UP: Patient will follow up with me as needed.  If she has questions or concerns, she should not hesitate to call.    Subjective:     Karen Arthur is a 87 y.o. female who presents today for follow-up regarding chronic low back pain, currently on the left with a 3-month history of increasing pain in the left groin and lateral hip.  This pain was severe in August and they were concerned it may be related to constipation.  They went to urgent care through [facility].  A CT abdomen pelvis was obtained which did not show any acute abnormality to explain the pain.  Patient's  and son note that she has good days and bad days with her pain and it is difficult to find any pattern for her pain level.  Last week pain flared up.  Since then, it has improved.    Patient complains of pain in the left groin, lateral hip/lateral thigh, and left buttock.  Family notes that some days she is able to walk without her cane but other days pain is severe and she relies on her cane.  She often holds onto her left lateral hip and applies manual pressure to provide some relief of her pain.  She was able to walk on a path in the park over the weekend without significant difficulty.  It  did seem like she became more sore a couple of days after this activity and needed some time to recover but by the next day she was feeling good again.  Today she rates her pain as a 1 out of 10.    Patient completed 3 sessions of physical therapy July 13, 2022.  She is unable to do the home exercises on her own because she does not remember them.  She uses Tylenol 1000 mg 3 times daily, nabumetone 750 mg twice daily. These medications are  helpful.        Review of Systems:  Negative for numbness/tingling, weakness, loss of bowel/bladder control, inability to urinate, headache, pain much worse at night, trip/stumble/falls, difficulty swallowing, fevers, unintentional weight loss, difficulty with hand skills.     Objective:   CONSTITUTIONAL:  Vital signs as above.  No acute distress.  The patient is well nourished and well groomed.    PSYCHIATRIC:  The patient is awake, alert.  Patient is confused.  HEENT: Normocephalic, atraumatic.  Sclera clear.    SKIN:  Skin over the face, posterior torso, bilateral upper and lower extremities is clean, dry, intact without rashes.  MUSCULOSKELETAL: Patient transitions from seated to standing independently.  Patient has a significant scoliotic deformity.   No significant tenderness palpation lumbar paraspinous muscles.  Mild tenderness palpation right sacroiliac joint.  Minimal tenderness palpation left greater trochanter.  Tender to palpation left groin.  Patient is able to rise onto toes and heels bilaterally with support.  The patient has 5/5 strength for the bilateral hip flexors, knee flexors/extensors, ankle dorsiflexors.  Range of motion of both hips is within normal limits.   NEUROLOGICAL:  Sensation intact to light touch bilateral lower extremities throughout.  No ankle clonus.     RESULTS:  I reviewed the MRI lumbar spine from [facility] dated November 22, 2021.  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  There is multilevel lumbar spondylosis.  At T12-L1 there is severe left foraminal stenosis.  At L1-L2 there is moderate left foraminal stenosis.  At L2-3 there is moderate spinal canal stenosis with mild to moderate right and moderate to severe left foraminal stenosis.  At L3-4 there is moderate to severe spinal canal stenosis with moderate bilateral foraminal stenosis.  At L4-5 there is mild spinal canal stenosis with moderate to severe right and mild left foraminal stenosis.  At L5-S1 there is moderate to severe right foraminal stenosis.  Please see report for further details.            Again, thank you for allowing me to participate in the care of your patient.        Sincerely,        Ailin Colunga PA-C

## 2023-01-01 ENCOUNTER — OFFICE VISIT (OUTPATIENT)
Dept: INTERNAL MEDICINE | Facility: CLINIC | Age: 88
End: 2023-01-01
Payer: COMMERCIAL

## 2023-01-01 ENCOUNTER — APPOINTMENT (OUTPATIENT)
Dept: PHYSICAL THERAPY | Facility: CLINIC | Age: 88
DRG: 177 | End: 2023-01-01
Attending: STUDENT IN AN ORGANIZED HEALTH CARE EDUCATION/TRAINING PROGRAM
Payer: MEDICARE

## 2023-01-01 ENCOUNTER — APPOINTMENT (OUTPATIENT)
Dept: SPEECH THERAPY | Facility: CLINIC | Age: 88
DRG: 177 | End: 2023-01-01
Attending: STUDENT IN AN ORGANIZED HEALTH CARE EDUCATION/TRAINING PROGRAM
Payer: MEDICARE

## 2023-01-01 ENCOUNTER — PATIENT OUTREACH (OUTPATIENT)
Dept: CARE COORDINATION | Facility: CLINIC | Age: 88
End: 2023-01-01
Payer: COMMERCIAL

## 2023-01-01 ENCOUNTER — MEDICAL CORRESPONDENCE (OUTPATIENT)
Dept: HEALTH INFORMATION MANAGEMENT | Facility: CLINIC | Age: 88
End: 2023-01-01
Payer: COMMERCIAL

## 2023-01-01 ENCOUNTER — VIRTUAL VISIT (OUTPATIENT)
Dept: URGENT CARE | Facility: CLINIC | Age: 88
End: 2023-01-01
Payer: COMMERCIAL

## 2023-01-01 ENCOUNTER — TELEPHONE (OUTPATIENT)
Dept: INTERNAL MEDICINE | Facility: CLINIC | Age: 88
End: 2023-01-01
Payer: COMMERCIAL

## 2023-01-01 ENCOUNTER — TRANSFERRED RECORDS (OUTPATIENT)
Dept: HEALTH INFORMATION MANAGEMENT | Facility: CLINIC | Age: 88
End: 2023-01-01
Payer: COMMERCIAL

## 2023-01-01 ENCOUNTER — HOSPITAL ENCOUNTER (INPATIENT)
Facility: CLINIC | Age: 88
LOS: 2 days | Discharge: HOME HEALTH | DRG: 177 | End: 2023-10-11
Attending: EMERGENCY MEDICINE | Admitting: STUDENT IN AN ORGANIZED HEALTH CARE EDUCATION/TRAINING PROGRAM
Payer: MEDICARE

## 2023-01-01 ENCOUNTER — VIRTUAL VISIT (OUTPATIENT)
Dept: INTERNAL MEDICINE | Facility: CLINIC | Age: 88
End: 2023-01-01
Payer: COMMERCIAL

## 2023-01-01 ENCOUNTER — TELEPHONE (OUTPATIENT)
Dept: INTERNAL MEDICINE | Facility: CLINIC | Age: 88
End: 2023-01-01

## 2023-01-01 ENCOUNTER — APPOINTMENT (OUTPATIENT)
Dept: GENERAL RADIOLOGY | Facility: CLINIC | Age: 88
DRG: 177 | End: 2023-01-01
Attending: EMERGENCY MEDICINE
Payer: MEDICARE

## 2023-01-01 ENCOUNTER — DOCUMENTATION ONLY (OUTPATIENT)
Dept: OTHER | Facility: CLINIC | Age: 88
End: 2023-01-01
Payer: COMMERCIAL

## 2023-01-01 ENCOUNTER — DOCUMENTATION ONLY (OUTPATIENT)
Dept: CARE COORDINATION | Facility: CLINIC | Age: 88
End: 2023-01-01

## 2023-01-01 VITALS
SYSTOLIC BLOOD PRESSURE: 146 MMHG | OXYGEN SATURATION: 94 % | TEMPERATURE: 97.6 F | DIASTOLIC BLOOD PRESSURE: 73 MMHG | RESPIRATION RATE: 16 BRPM | HEART RATE: 67 BPM | BODY MASS INDEX: 20.71 KG/M2 | WEIGHT: 115.08 LBS

## 2023-01-01 VITALS
DIASTOLIC BLOOD PRESSURE: 80 MMHG | HEIGHT: 63 IN | HEART RATE: 72 BPM | RESPIRATION RATE: 16 BRPM | BODY MASS INDEX: 22.04 KG/M2 | WEIGHT: 124.4 LBS | SYSTOLIC BLOOD PRESSURE: 110 MMHG

## 2023-01-01 DIAGNOSIS — U07.1 INFECTION DUE TO 2019 NOVEL CORONAVIRUS: Primary | ICD-10-CM

## 2023-01-01 DIAGNOSIS — G89.29 CHRONIC BILATERAL LOW BACK PAIN WITH BILATERAL SCIATICA: ICD-10-CM

## 2023-01-01 DIAGNOSIS — M54.41 CHRONIC BILATERAL LOW BACK PAIN WITH BILATERAL SCIATICA: ICD-10-CM

## 2023-01-01 DIAGNOSIS — E03.9 HYPOTHYROIDISM, UNSPECIFIED TYPE: ICD-10-CM

## 2023-01-01 DIAGNOSIS — M81.0 AGE-RELATED OSTEOPOROSIS WITHOUT CURRENT PATHOLOGICAL FRACTURE: ICD-10-CM

## 2023-01-01 DIAGNOSIS — F02.80 ALZHEIMER'S DEMENTIA WITHOUT BEHAVIORAL DISTURBANCE (H): ICD-10-CM

## 2023-01-01 DIAGNOSIS — Z85.828 HISTORY OF SKIN CANCER: ICD-10-CM

## 2023-01-01 DIAGNOSIS — D64.9 MILD ANEMIA: ICD-10-CM

## 2023-01-01 DIAGNOSIS — E78.5 HYPERLIPIDEMIA, UNSPECIFIED HYPERLIPIDEMIA TYPE: Chronic | ICD-10-CM

## 2023-01-01 DIAGNOSIS — J69.0 ASPIRATION PNEUMONIA OF RIGHT LOWER LOBE, UNSPECIFIED ASPIRATION PNEUMONIA TYPE (H): ICD-10-CM

## 2023-01-01 DIAGNOSIS — R11.0 NAUSEA: ICD-10-CM

## 2023-01-01 DIAGNOSIS — K62.89 MASS IN RECTUM: ICD-10-CM

## 2023-01-01 DIAGNOSIS — I25.10 ASCVD (ARTERIOSCLEROTIC CARDIOVASCULAR DISEASE): ICD-10-CM

## 2023-01-01 DIAGNOSIS — I42.8 NONISCHEMIC CARDIOMYOPATHY (H): ICD-10-CM

## 2023-01-01 DIAGNOSIS — Z00.00 ENCOUNTER FOR MEDICARE ANNUAL WELLNESS EXAM: Primary | ICD-10-CM

## 2023-01-01 DIAGNOSIS — J96.01 ACUTE RESPIRATORY FAILURE WITH HYPOXIA (H): ICD-10-CM

## 2023-01-01 DIAGNOSIS — F03.90 DEMENTIA WITHOUT BEHAVIORAL DISTURBANCE (H): ICD-10-CM

## 2023-01-01 DIAGNOSIS — Z53.9 DIAGNOSIS NOT YET DEFINED: Primary | ICD-10-CM

## 2023-01-01 DIAGNOSIS — R15.1 FECAL SMEARING: ICD-10-CM

## 2023-01-01 DIAGNOSIS — I10 ESSENTIAL HYPERTENSION: ICD-10-CM

## 2023-01-01 DIAGNOSIS — M54.42 CHRONIC BILATERAL LOW BACK PAIN WITH BILATERAL SCIATICA: ICD-10-CM

## 2023-01-01 DIAGNOSIS — E55.9 VITAMIN D DEFICIENCY: Chronic | ICD-10-CM

## 2023-01-01 DIAGNOSIS — G30.9 ALZHEIMER'S DEMENTIA WITHOUT BEHAVIORAL DISTURBANCE (H): ICD-10-CM

## 2023-01-01 DIAGNOSIS — I47.29 NSVT (NONSUSTAINED VENTRICULAR TACHYCARDIA) (H): ICD-10-CM

## 2023-01-01 DIAGNOSIS — U07.1 COVID-19 VIRUS INFECTION: ICD-10-CM

## 2023-01-01 LAB
ALBUMIN SERPL BCG-MCNC: 3.3 G/DL (ref 3.5–5.2)
ALBUMIN SERPL BCG-MCNC: 4.2 G/DL (ref 3.5–5.2)
ALP SERPL-CCNC: 83 U/L (ref 35–104)
ALP SERPL-CCNC: 90 U/L (ref 35–104)
ALT SERPL W P-5'-P-CCNC: 15 U/L (ref 10–35)
ALT SERPL W P-5'-P-CCNC: 28 U/L (ref 0–50)
ANION GAP SERPL CALCULATED.3IONS-SCNC: 13 MMOL/L (ref 7–15)
ANION GAP SERPL CALCULATED.3IONS-SCNC: 13 MMOL/L (ref 7–15)
ANION GAP SERPL CALCULATED.3IONS-SCNC: 14 MMOL/L (ref 7–15)
ANION GAP SERPL CALCULATED.3IONS-SCNC: 16 MMOL/L (ref 7–15)
AST SERPL W P-5'-P-CCNC: 25 U/L (ref 10–35)
AST SERPL W P-5'-P-CCNC: 57 U/L (ref 0–45)
BACTERIA BLD CULT: NO GROWTH
BACTERIA BLD CULT: NO GROWTH
BASE EXCESS BLDV CALC-SCNC: -2.6 MMOL/L (ref -7.7–1.9)
BASOPHILS # BLD MANUAL: 0 10E3/UL (ref 0–0.2)
BASOPHILS NFR BLD MANUAL: 0 %
BILIRUB SERPL-MCNC: 0.2 MG/DL
BILIRUB SERPL-MCNC: 0.3 MG/DL
BUN SERPL-MCNC: 36.5 MG/DL (ref 8–23)
BUN SERPL-MCNC: 38.5 MG/DL (ref 8–23)
BUN SERPL-MCNC: 40.9 MG/DL (ref 8–23)
BUN SERPL-MCNC: 47.5 MG/DL (ref 8–23)
BURR CELLS BLD QL SMEAR: SLIGHT
CALCIUM SERPL-MCNC: 8 MG/DL (ref 8.8–10.2)
CALCIUM SERPL-MCNC: 8.1 MG/DL (ref 8.8–10.2)
CALCIUM SERPL-MCNC: 8.7 MG/DL (ref 8.8–10.2)
CALCIUM SERPL-MCNC: 9.8 MG/DL (ref 8.8–10.2)
CHLORIDE SERPL-SCNC: 100 MMOL/L (ref 98–107)
CHLORIDE SERPL-SCNC: 105 MMOL/L (ref 98–107)
CHLORIDE SERPL-SCNC: 105 MMOL/L (ref 98–107)
CHLORIDE SERPL-SCNC: 106 MMOL/L (ref 98–107)
CHOLEST SERPL-MCNC: 185 MG/DL
CREAT SERPL-MCNC: 1.32 MG/DL (ref 0.51–0.95)
CREAT SERPL-MCNC: 1.47 MG/DL (ref 0.51–0.95)
CREAT SERPL-MCNC: 1.54 MG/DL (ref 0.51–0.95)
CREAT SERPL-MCNC: 1.6 MG/DL (ref 0.51–0.95)
CRP SERPL-MCNC: 181 MG/L
CRP SERPL-MCNC: 187 MG/L
CRP SERPL-MCNC: 285 MG/L
D DIMER PPP FEU-MCNC: 1.8 UG/ML FEU (ref 0–0.5)
D DIMER PPP FEU-MCNC: 1.88 UG/ML FEU (ref 0–0.5)
D DIMER PPP FEU-MCNC: 2.5 UG/ML FEU (ref 0–0.5)
DEPRECATED CALCIDIOL+CALCIFEROL SERPL-MC: 59 UG/L (ref 20–75)
DEPRECATED HCO3 PLAS-SCNC: 17 MMOL/L (ref 22–29)
DEPRECATED HCO3 PLAS-SCNC: 18 MMOL/L (ref 22–29)
DEPRECATED HCO3 PLAS-SCNC: 18 MMOL/L (ref 22–29)
DEPRECATED HCO3 PLAS-SCNC: 22 MMOL/L (ref 22–29)
EGFRCR SERPLBLD CKD-EPI 2021: 31 ML/MIN/1.73M2
EGFRCR SERPLBLD CKD-EPI 2021: 32 ML/MIN/1.73M2
EGFRCR SERPLBLD CKD-EPI 2021: 39 ML/MIN/1.73M2
EOSINOPHIL # BLD MANUAL: 0 10E3/UL (ref 0–0.7)
EOSINOPHIL NFR BLD MANUAL: 0 %
ERYTHROCYTE [DISTWIDTH] IN BLOOD BY AUTOMATED COUNT: 12.9 % (ref 10–15)
ERYTHROCYTE [DISTWIDTH] IN BLOOD BY AUTOMATED COUNT: 13.3 % (ref 10–15)
ERYTHROCYTE [DISTWIDTH] IN BLOOD BY AUTOMATED COUNT: 13.4 % (ref 10–15)
ERYTHROCYTE [DISTWIDTH] IN BLOOD BY AUTOMATED COUNT: 13.5 % (ref 10–15)
GFR SERPL CREATININE-BSD FRML MDRD: 34 ML/MIN/1.73M2
GLUCOSE SERPL-MCNC: 114 MG/DL (ref 70–99)
GLUCOSE SERPL-MCNC: 129 MG/DL (ref 70–99)
GLUCOSE SERPL-MCNC: 80 MG/DL (ref 70–99)
GLUCOSE SERPL-MCNC: 95 MG/DL (ref 70–99)
HCO3 BLDV-SCNC: 23 MMOL/L (ref 21–28)
HCT VFR BLD AUTO: 28.4 % (ref 35–47)
HCT VFR BLD AUTO: 31.7 % (ref 35–47)
HCT VFR BLD AUTO: 34.1 % (ref 35–47)
HCT VFR BLD AUTO: 34.9 % (ref 35–47)
HDLC SERPL-MCNC: 59 MG/DL
HGB BLD-MCNC: 10.5 G/DL (ref 11.7–15.7)
HGB BLD-MCNC: 11.3 G/DL (ref 11.7–15.7)
HGB BLD-MCNC: 11.3 G/DL (ref 11.7–15.7)
HGB BLD-MCNC: 9.6 G/DL (ref 11.7–15.7)
HOLD SPECIMEN: NORMAL
IRON BINDING CAPACITY (ROCHE): 358 UG/DL (ref 240–430)
IRON SATN MFR SERPL: 26 % (ref 15–46)
IRON SERPL-MCNC: 92 UG/DL (ref 37–145)
LACTATE SERPL-SCNC: 1.7 MMOL/L (ref 0.7–2)
LDH SERPL L TO P-CCNC: 268 U/L (ref 0–250)
LDLC SERPL CALC-MCNC: 91 MG/DL
LYMPHOCYTES # BLD MANUAL: 0.5 10E3/UL (ref 0.8–5.3)
LYMPHOCYTES NFR BLD MANUAL: 15 %
MAGNESIUM SERPL-MCNC: 1.7 MG/DL (ref 1.7–2.3)
MCH RBC QN AUTO: 31.4 PG (ref 26.5–33)
MCH RBC QN AUTO: 31.4 PG (ref 26.5–33)
MCH RBC QN AUTO: 31.8 PG (ref 26.5–33)
MCH RBC QN AUTO: 32.5 PG (ref 26.5–33)
MCHC RBC AUTO-ENTMCNC: 32.4 G/DL (ref 31.5–36.5)
MCHC RBC AUTO-ENTMCNC: 33.1 G/DL (ref 31.5–36.5)
MCHC RBC AUTO-ENTMCNC: 33.1 G/DL (ref 31.5–36.5)
MCHC RBC AUTO-ENTMCNC: 33.8 G/DL (ref 31.5–36.5)
MCV RBC AUTO: 100 FL (ref 78–100)
MCV RBC AUTO: 93 FL (ref 78–100)
MCV RBC AUTO: 95 FL (ref 78–100)
MCV RBC AUTO: 96 FL (ref 78–100)
METAMYELOCYTES # BLD MANUAL: 0.2 10E3/UL
METAMYELOCYTES NFR BLD MANUAL: 7 %
MONOCYTES # BLD MANUAL: 0.1 10E3/UL (ref 0–1.3)
MONOCYTES NFR BLD MANUAL: 4 %
MYELOCYTES # BLD MANUAL: 0.1 10E3/UL
MYELOCYTES NFR BLD MANUAL: 4 %
NEUTROPHILS # BLD MANUAL: 2.1 10E3/UL (ref 1.6–8.3)
NEUTROPHILS NFR BLD MANUAL: 70 %
NONHDLC SERPL-MCNC: 126 MG/DL
NT-PROBNP SERPL-MCNC: 4800 PG/ML (ref 0–1800)
O2/TOTAL GAS SETTING VFR VENT: 100 %
PCO2 BLDV: 42 MM HG (ref 40–50)
PH BLDV: 7.35 [PH] (ref 7.32–7.43)
PHOSPHATE SERPL-MCNC: 4.3 MG/DL (ref 2.5–4.5)
PLAT MORPH BLD: ABNORMAL
PLATELET # BLD AUTO: 164 10E3/UL (ref 150–450)
PLATELET # BLD AUTO: 167 10E3/UL (ref 150–450)
PLATELET # BLD AUTO: 173 10E3/UL (ref 150–450)
PLATELET # BLD AUTO: 226 10E3/UL (ref 150–450)
PO2 BLDV: 26 MM HG (ref 25–47)
POTASSIUM SERPL-SCNC: 4.2 MMOL/L (ref 3.4–5.3)
POTASSIUM SERPL-SCNC: 4.3 MMOL/L (ref 3.4–5.3)
POTASSIUM SERPL-SCNC: 4.8 MMOL/L (ref 3.4–5.3)
POTASSIUM SERPL-SCNC: 4.8 MMOL/L (ref 3.4–5.3)
PROCALCITONIN SERPL IA-MCNC: 6.29 NG/ML
PROT SERPL-MCNC: 6.4 G/DL (ref 6.4–8.3)
PROT SERPL-MCNC: 6.9 G/DL (ref 6.4–8.3)
RBC # BLD AUTO: 3.06 10E6/UL (ref 3.8–5.2)
RBC # BLD AUTO: 3.34 10E6/UL (ref 3.8–5.2)
RBC # BLD AUTO: 3.48 10E6/UL (ref 3.8–5.2)
RBC # BLD AUTO: 3.55 10E6/UL (ref 3.8–5.2)
RBC MORPH BLD: ABNORMAL
SARS-COV-2 RNA RESP QL NAA+PROBE: POSITIVE
SODIUM SERPL-SCNC: 133 MMOL/L (ref 135–145)
SODIUM SERPL-SCNC: 136 MMOL/L (ref 135–145)
SODIUM SERPL-SCNC: 138 MMOL/L (ref 135–145)
SODIUM SERPL-SCNC: 140 MMOL/L (ref 136–145)
TRIGL SERPL-MCNC: 174 MG/DL
TROPONIN T SERPL HS-MCNC: 37 NG/L
TSH SERPL DL<=0.005 MIU/L-ACNC: 0.51 UIU/ML (ref 0.3–4.2)
TSH SERPL DL<=0.005 MIU/L-ACNC: 5.5 UIU/ML (ref 0.3–4.2)
VIT B12 SERPL-MCNC: 501 PG/ML (ref 232–1245)
WBC # BLD AUTO: 3 10E3/UL (ref 4–11)
WBC # BLD AUTO: 5.5 10E3/UL (ref 4–11)
WBC # BLD AUTO: 5.9 10E3/UL (ref 4–11)
WBC # BLD AUTO: 9.6 10E3/UL (ref 4–11)

## 2023-01-01 PROCEDURE — 36415 COLL VENOUS BLD VENIPUNCTURE: CPT | Performed by: INTERNAL MEDICINE

## 2023-01-01 PROCEDURE — 97530 THERAPEUTIC ACTIVITIES: CPT | Mod: GP

## 2023-01-01 PROCEDURE — 85379 FIBRIN DEGRADATION QUANT: CPT | Performed by: EMERGENCY MEDICINE

## 2023-01-01 PROCEDURE — 84100 ASSAY OF PHOSPHORUS: CPT | Performed by: EMERGENCY MEDICINE

## 2023-01-01 PROCEDURE — 250N000013 HC RX MED GY IP 250 OP 250 PS 637: Performed by: EMERGENCY MEDICINE

## 2023-01-01 PROCEDURE — 83735 ASSAY OF MAGNESIUM: CPT | Performed by: EMERGENCY MEDICINE

## 2023-01-01 PROCEDURE — 99397 PER PM REEVAL EST PAT 65+ YR: CPT | Performed by: INTERNAL MEDICINE

## 2023-01-01 PROCEDURE — 99207 PR APP CREDIT; MD BILLING SHARED VISIT: CPT | Mod: FS | Performed by: STUDENT IN AN ORGANIZED HEALTH CARE EDUCATION/TRAINING PROGRAM

## 2023-01-01 PROCEDURE — 99223 1ST HOSP IP/OBS HIGH 75: CPT | Performed by: STUDENT IN AN ORGANIZED HEALTH CARE EDUCATION/TRAINING PROGRAM

## 2023-01-01 PROCEDURE — 82306 VITAMIN D 25 HYDROXY: CPT | Performed by: INTERNAL MEDICINE

## 2023-01-01 PROCEDURE — 258N000003 HC RX IP 258 OP 636: Performed by: EMERGENCY MEDICINE

## 2023-01-01 PROCEDURE — 250N000013 HC RX MED GY IP 250 OP 250 PS 637: Performed by: PHYSICIAN ASSISTANT

## 2023-01-01 PROCEDURE — 250N000013 HC RX MED GY IP 250 OP 250 PS 637: Performed by: STUDENT IN AN ORGANIZED HEALTH CARE EDUCATION/TRAINING PROGRAM

## 2023-01-01 PROCEDURE — 250N000011 HC RX IP 250 OP 636: Performed by: STUDENT IN AN ORGANIZED HEALTH CARE EDUCATION/TRAINING PROGRAM

## 2023-01-01 PROCEDURE — 85027 COMPLETE CBC AUTOMATED: CPT | Performed by: STUDENT IN AN ORGANIZED HEALTH CARE EDUCATION/TRAINING PROGRAM

## 2023-01-01 PROCEDURE — 120N000002 HC R&B MED SURG/OB UMMC

## 2023-01-01 PROCEDURE — 87040 BLOOD CULTURE FOR BACTERIA: CPT | Performed by: EMERGENCY MEDICINE

## 2023-01-01 PROCEDURE — 92610 EVALUATE SWALLOWING FUNCTION: CPT | Mod: GN

## 2023-01-01 PROCEDURE — 36415 COLL VENOUS BLD VENIPUNCTURE: CPT | Performed by: STUDENT IN AN ORGANIZED HEALTH CARE EDUCATION/TRAINING PROGRAM

## 2023-01-01 PROCEDURE — 83540 ASSAY OF IRON: CPT | Performed by: INTERNAL MEDICINE

## 2023-01-01 PROCEDURE — 271N000002 HC RX 271: Performed by: STUDENT IN AN ORGANIZED HEALTH CARE EDUCATION/TRAINING PROGRAM

## 2023-01-01 PROCEDURE — 99292 CRITICAL CARE ADDL 30 MIN: CPT | Performed by: EMERGENCY MEDICINE

## 2023-01-01 PROCEDURE — 80048 BASIC METABOLIC PNL TOTAL CA: CPT | Performed by: STUDENT IN AN ORGANIZED HEALTH CARE EDUCATION/TRAINING PROGRAM

## 2023-01-01 PROCEDURE — 85027 COMPLETE CBC AUTOMATED: CPT | Performed by: INTERNAL MEDICINE

## 2023-01-01 PROCEDURE — 258N000003 HC RX IP 258 OP 636: Performed by: STUDENT IN AN ORGANIZED HEALTH CARE EDUCATION/TRAINING PROGRAM

## 2023-01-01 PROCEDURE — 80053 COMPREHEN METABOLIC PANEL: CPT | Performed by: EMERGENCY MEDICINE

## 2023-01-01 PROCEDURE — 71045 X-RAY EXAM CHEST 1 VIEW: CPT

## 2023-01-01 PROCEDURE — 85379 FIBRIN DEGRADATION QUANT: CPT | Performed by: STUDENT IN AN ORGANIZED HEALTH CARE EDUCATION/TRAINING PROGRAM

## 2023-01-01 PROCEDURE — 99215 OFFICE O/P EST HI 40 MIN: CPT | Mod: 25 | Performed by: INTERNAL MEDICINE

## 2023-01-01 PROCEDURE — 83550 IRON BINDING TEST: CPT | Performed by: INTERNAL MEDICINE

## 2023-01-01 PROCEDURE — 36415 COLL VENOUS BLD VENIPUNCTURE: CPT | Performed by: EMERGENCY MEDICINE

## 2023-01-01 PROCEDURE — 97161 PT EVAL LOW COMPLEX 20 MIN: CPT | Mod: GP

## 2023-01-01 PROCEDURE — 83605 ASSAY OF LACTIC ACID: CPT | Performed by: EMERGENCY MEDICINE

## 2023-01-01 PROCEDURE — 97116 GAIT TRAINING THERAPY: CPT | Mod: GP

## 2023-01-01 PROCEDURE — 99213 OFFICE O/P EST LOW 20 MIN: CPT | Mod: 93

## 2023-01-01 PROCEDURE — 84443 ASSAY THYROID STIM HORMONE: CPT | Performed by: PHYSICIAN ASSISTANT

## 2023-01-01 PROCEDURE — 86140 C-REACTIVE PROTEIN: CPT | Performed by: STUDENT IN AN ORGANIZED HEALTH CARE EDUCATION/TRAINING PROGRAM

## 2023-01-01 PROCEDURE — 83615 LACTATE (LD) (LDH) ENZYME: CPT | Performed by: EMERGENCY MEDICINE

## 2023-01-01 PROCEDURE — 85007 BL SMEAR W/DIFF WBC COUNT: CPT | Performed by: EMERGENCY MEDICINE

## 2023-01-01 PROCEDURE — 82607 VITAMIN B-12: CPT | Performed by: INTERNAL MEDICINE

## 2023-01-01 PROCEDURE — 99291 CRITICAL CARE FIRST HOUR: CPT | Mod: 25 | Performed by: EMERGENCY MEDICINE

## 2023-01-01 PROCEDURE — 82803 BLOOD GASES ANY COMBINATION: CPT | Performed by: EMERGENCY MEDICINE

## 2023-01-01 PROCEDURE — 99291 CRITICAL CARE FIRST HOUR: CPT | Performed by: EMERGENCY MEDICINE

## 2023-01-01 PROCEDURE — 80053 COMPREHEN METABOLIC PANEL: CPT | Performed by: INTERNAL MEDICINE

## 2023-01-01 PROCEDURE — 83880 ASSAY OF NATRIURETIC PEPTIDE: CPT | Performed by: EMERGENCY MEDICINE

## 2023-01-01 PROCEDURE — 99214 OFFICE O/P EST MOD 30 MIN: CPT | Mod: VID | Performed by: INTERNAL MEDICINE

## 2023-01-01 PROCEDURE — 84484 ASSAY OF TROPONIN QUANT: CPT | Performed by: EMERGENCY MEDICINE

## 2023-01-01 PROCEDURE — 71045 X-RAY EXAM CHEST 1 VIEW: CPT | Mod: 26 | Performed by: RADIOLOGY

## 2023-01-01 PROCEDURE — 99239 HOSP IP/OBS DSCHRG MGMT >30: CPT | Mod: FS

## 2023-01-01 PROCEDURE — 250N000011 HC RX IP 250 OP 636: Mod: JZ | Performed by: STUDENT IN AN ORGANIZED HEALTH CARE EDUCATION/TRAINING PROGRAM

## 2023-01-01 PROCEDURE — XW033E5 INTRODUCTION OF REMDESIVIR ANTI-INFECTIVE INTO PERIPHERAL VEIN, PERCUTANEOUS APPROACH, NEW TECHNOLOGY GROUP 5: ICD-10-PCS | Performed by: STUDENT IN AN ORGANIZED HEALTH CARE EDUCATION/TRAINING PROGRAM

## 2023-01-01 PROCEDURE — 84145 PROCALCITONIN (PCT): CPT | Performed by: EMERGENCY MEDICINE

## 2023-01-01 PROCEDURE — 86140 C-REACTIVE PROTEIN: CPT | Performed by: EMERGENCY MEDICINE

## 2023-01-01 PROCEDURE — 87635 SARS-COV-2 COVID-19 AMP PRB: CPT | Performed by: EMERGENCY MEDICINE

## 2023-01-01 PROCEDURE — G0180 MD CERTIFICATION HHA PATIENT: HCPCS | Performed by: INTERNAL MEDICINE

## 2023-01-01 PROCEDURE — 84443 ASSAY THYROID STIM HORMONE: CPT | Performed by: INTERNAL MEDICINE

## 2023-01-01 PROCEDURE — 999N000157 HC STATISTIC RCP TIME EA 10 MIN

## 2023-01-01 PROCEDURE — 80061 LIPID PANEL: CPT | Performed by: INTERNAL MEDICINE

## 2023-01-01 PROCEDURE — 85027 COMPLETE CBC AUTOMATED: CPT | Performed by: EMERGENCY MEDICINE

## 2023-01-01 PROCEDURE — 99232 SBSQ HOSP IP/OBS MODERATE 35: CPT | Performed by: PHYSICIAN ASSISTANT

## 2023-01-01 PROCEDURE — 250N000011 HC RX IP 250 OP 636: Performed by: EMERGENCY MEDICINE

## 2023-01-01 RX ORDER — HEPARIN SODIUM 5000 [USP'U]/.5ML
5000 INJECTION, SOLUTION INTRAVENOUS; SUBCUTANEOUS EVERY 12 HOURS
Status: DISCONTINUED | OUTPATIENT
Start: 2023-01-01 | End: 2023-01-01 | Stop reason: HOSPADM

## 2023-01-01 RX ORDER — ONDANSETRON 2 MG/ML
4 INJECTION INTRAMUSCULAR; INTRAVENOUS EVERY 6 HOURS PRN
Status: DISCONTINUED | OUTPATIENT
Start: 2023-01-01 | End: 2023-01-01 | Stop reason: HOSPADM

## 2023-01-01 RX ORDER — LEVOTHYROXINE SODIUM 75 UG/1
TABLET ORAL
Qty: 90 TABLET | Refills: 0 | OUTPATIENT
Start: 2023-01-01

## 2023-01-01 RX ORDER — ALBUTEROL SULFATE 90 UG/1
2 AEROSOL, METERED RESPIRATORY (INHALATION) EVERY 4 HOURS PRN
Status: DISCONTINUED | OUTPATIENT
Start: 2023-01-01 | End: 2023-01-01 | Stop reason: HOSPADM

## 2023-01-01 RX ORDER — AMOXICILLIN AND CLAVULANATE POTASSIUM 400; 57 MG/1; MG/1
1 TABLET, CHEWABLE ORAL EVERY 12 HOURS
Qty: 12 TABLET | Refills: 0 | Status: SHIPPED | OUTPATIENT
Start: 2023-01-01

## 2023-01-01 RX ORDER — CARBOXYMETHYLCELLULOSE SODIUM 5 MG/ML
1 SOLUTION/ DROPS OPHTHALMIC
Status: DISCONTINUED | OUTPATIENT
Start: 2023-01-01 | End: 2023-01-01 | Stop reason: HOSPADM

## 2023-01-01 RX ORDER — DEXAMETHASONE SODIUM PHOSPHATE 10 MG/ML
6 INJECTION, SOLUTION INTRAMUSCULAR; INTRAVENOUS DAILY
Status: DISCONTINUED | OUTPATIENT
Start: 2023-01-01 | End: 2023-01-01 | Stop reason: HOSPADM

## 2023-01-01 RX ORDER — ALBUTEROL SULFATE 90 UG/1
6 AEROSOL, METERED RESPIRATORY (INHALATION) ONCE
Status: COMPLETED | OUTPATIENT
Start: 2023-01-01 | End: 2023-01-01

## 2023-01-01 RX ORDER — LANSOPRAZOLE 30 MG/1
CAPSULE, DELAYED RELEASE ORAL
Qty: 90 CAPSULE | Refills: 3 | Status: SHIPPED | OUTPATIENT
Start: 2023-01-01

## 2023-01-01 RX ORDER — MEMANTINE HYDROCHLORIDE 10 MG/1
10 TABLET ORAL 2 TIMES DAILY
Qty: 180 TABLET | Refills: 3 | Status: SHIPPED | OUTPATIENT
Start: 2023-01-01

## 2023-01-01 RX ORDER — SODIUM CHLORIDE, SODIUM LACTATE, POTASSIUM CHLORIDE, CALCIUM CHLORIDE 600; 310; 30; 20 MG/100ML; MG/100ML; MG/100ML; MG/100ML
INJECTION, SOLUTION INTRAVENOUS CONTINUOUS
Status: DISCONTINUED | OUTPATIENT
Start: 2023-01-01 | End: 2023-01-01

## 2023-01-01 RX ORDER — AMPICILLIN AND SULBACTAM 2; 1 G/1; G/1
3 INJECTION, POWDER, FOR SOLUTION INTRAMUSCULAR; INTRAVENOUS ONCE
Status: COMPLETED | OUTPATIENT
Start: 2023-01-01 | End: 2023-01-01

## 2023-01-01 RX ORDER — LEVOTHYROXINE SODIUM 88 UG/1
88 TABLET ORAL DAILY
Qty: 90 TABLET | Refills: 3 | Status: SHIPPED | OUTPATIENT
Start: 2023-01-01

## 2023-01-01 RX ORDER — ALBUTEROL SULFATE 90 UG/1
2 AEROSOL, METERED RESPIRATORY (INHALATION) EVERY 4 HOURS PRN
Qty: 18 G | Refills: 0 | Status: SHIPPED | OUTPATIENT
Start: 2023-01-01

## 2023-01-01 RX ORDER — MEMANTINE HYDROCHLORIDE 10 MG/1
TABLET ORAL
Qty: 180 TABLET | Refills: 0 | Status: SHIPPED | OUTPATIENT
Start: 2023-01-01 | End: 2023-01-01

## 2023-01-01 RX ORDER — AMPICILLIN AND SULBACTAM 2; 1 G/1; G/1
3 INJECTION, POWDER, FOR SOLUTION INTRAMUSCULAR; INTRAVENOUS EVERY 12 HOURS
Status: DISCONTINUED | OUTPATIENT
Start: 2023-01-01 | End: 2023-01-01

## 2023-01-01 RX ORDER — ACETAMINOPHEN 325 MG/10.15ML
650 LIQUID ORAL EVERY 6 HOURS
Status: DISCONTINUED | OUTPATIENT
Start: 2023-01-01 | End: 2023-01-01 | Stop reason: HOSPADM

## 2023-01-01 RX ORDER — AMOXICILLIN AND CLAVULANATE POTASSIUM 500; 125 MG/1; MG/1
1 TABLET, FILM COATED ORAL 2 TIMES DAILY WITH MEALS
Status: DISCONTINUED | OUTPATIENT
Start: 2023-01-01 | End: 2023-01-01 | Stop reason: HOSPADM

## 2023-01-01 RX ORDER — INHALER, ASSIST DEVICES
1 SPACER (EA) MISCELLANEOUS ONCE
Status: COMPLETED | OUTPATIENT
Start: 2023-01-01 | End: 2023-01-01

## 2023-01-01 RX ORDER — CARBOXYMETHYLCELLULOSE SODIUM 5 MG/ML
1 SOLUTION/ DROPS OPHTHALMIC
Qty: 1 EACH | Refills: 0 | Status: SHIPPED | OUTPATIENT
Start: 2023-01-01

## 2023-01-01 RX ORDER — LEVOTHYROXINE SODIUM 75 UG/1
TABLET ORAL
Qty: 90 TABLET | Refills: 3 | Status: SHIPPED | OUTPATIENT
Start: 2023-01-01 | End: 2023-01-01

## 2023-01-01 RX ORDER — CARVEDILOL 12.5 MG/1
12.5 TABLET ORAL 2 TIMES DAILY WITH MEALS
Status: DISCONTINUED | OUTPATIENT
Start: 2023-01-01 | End: 2023-01-01 | Stop reason: HOSPADM

## 2023-01-01 RX ORDER — DEXAMETHASONE SODIUM PHOSPHATE 10 MG/ML
6 INJECTION, SOLUTION INTRAMUSCULAR; INTRAVENOUS DAILY
Status: CANCELLED | OUTPATIENT
Start: 2023-01-01 | End: 2023-01-01

## 2023-01-01 RX ORDER — CARVEDILOL 12.5 MG/1
TABLET ORAL
Qty: 180 TABLET | Refills: 3 | Status: SHIPPED | OUTPATIENT
Start: 2023-01-01

## 2023-01-01 RX ORDER — LEVOTHYROXINE SODIUM 75 UG/1
TABLET ORAL
Qty: 90 TABLET | Refills: 0 | Status: SHIPPED | OUTPATIENT
Start: 2023-01-01 | End: 2023-01-01

## 2023-01-01 RX ORDER — LEVOTHYROXINE SODIUM 88 UG/1
88 TABLET ORAL DAILY
Status: DISCONTINUED | OUTPATIENT
Start: 2023-01-01 | End: 2023-01-01 | Stop reason: HOSPADM

## 2023-01-01 RX ORDER — CARVEDILOL 12.5 MG/1
TABLET ORAL
Qty: 180 TABLET | Refills: 0 | Status: SHIPPED | OUTPATIENT
Start: 2023-01-01 | End: 2023-01-01

## 2023-01-01 RX ORDER — MEMANTINE HYDROCHLORIDE 10 MG/1
10 TABLET ORAL 2 TIMES DAILY
Status: DISCONTINUED | OUTPATIENT
Start: 2023-01-01 | End: 2023-01-01 | Stop reason: HOSPADM

## 2023-01-01 RX ORDER — ONDANSETRON 4 MG/1
4 TABLET, ORALLY DISINTEGRATING ORAL EVERY 6 HOURS PRN
Status: DISCONTINUED | OUTPATIENT
Start: 2023-01-01 | End: 2023-01-01 | Stop reason: HOSPADM

## 2023-01-01 RX ORDER — ROSUVASTATIN CALCIUM 40 MG/1
40 TABLET, COATED ORAL AT BEDTIME
COMMUNITY

## 2023-01-01 RX ADMIN — REMDESIVIR 200 MG: 100 INJECTION, POWDER, LYOPHILIZED, FOR SOLUTION INTRAVENOUS at 18:32

## 2023-01-01 RX ADMIN — ALBUTEROL SULFATE 6 PUFF: 90 AEROSOL, METERED RESPIRATORY (INHALATION) at 14:44

## 2023-01-01 RX ADMIN — ACETAMINOPHEN 650 MG: 325 SOLUTION ORAL at 18:42

## 2023-01-01 RX ADMIN — AMOXICILLIN AND CLAVULANATE POTASSIUM 1 TABLET: 500; 125 TABLET, FILM COATED ORAL at 10:11

## 2023-01-01 RX ADMIN — MEMANTINE 10 MG: 10 TABLET ORAL at 21:39

## 2023-01-01 RX ADMIN — ACETAMINOPHEN 650 MG: 325 SOLUTION ORAL at 06:44

## 2023-01-01 RX ADMIN — DEXAMETHASONE SODIUM PHOSPHATE 6 MG: 10 INJECTION, SOLUTION INTRAMUSCULAR; INTRAVENOUS at 12:59

## 2023-01-01 RX ADMIN — SODIUM CHLORIDE, POTASSIUM CHLORIDE, SODIUM LACTATE AND CALCIUM CHLORIDE: 600; 310; 30; 20 INJECTION, SOLUTION INTRAVENOUS at 05:38

## 2023-01-01 RX ADMIN — DEXAMETHASONE SODIUM PHOSPHATE 6 MG: 10 INJECTION, SOLUTION INTRAMUSCULAR; INTRAVENOUS at 14:44

## 2023-01-01 RX ADMIN — MEMANTINE 10 MG: 10 TABLET ORAL at 10:14

## 2023-01-01 RX ADMIN — SODIUM CHLORIDE, POTASSIUM CHLORIDE, SODIUM LACTATE AND CALCIUM CHLORIDE: 600; 310; 30; 20 INJECTION, SOLUTION INTRAVENOUS at 19:38

## 2023-01-01 RX ADMIN — SODIUM CHLORIDE 50 ML: 9 INJECTION, SOLUTION INTRAVENOUS at 18:31

## 2023-01-01 RX ADMIN — REMDESIVIR 100 MG: 100 INJECTION, POWDER, LYOPHILIZED, FOR SOLUTION INTRAVENOUS at 18:17

## 2023-01-01 RX ADMIN — ACETAMINOPHEN 650 MG: 325 SOLUTION ORAL at 13:00

## 2023-01-01 RX ADMIN — HEPARIN SODIUM 5000 UNITS: 5000 INJECTION, SOLUTION INTRAVENOUS; SUBCUTANEOUS at 21:11

## 2023-01-01 RX ADMIN — Medication 1 EACH: at 20:59

## 2023-01-01 RX ADMIN — CARVEDILOL 12.5 MG: 12.5 TABLET, FILM COATED ORAL at 09:48

## 2023-01-01 RX ADMIN — LEVOTHYROXINE SODIUM 88 MCG: 88 TABLET ORAL at 09:48

## 2023-01-01 RX ADMIN — ACETAMINOPHEN 650 MG: 325 SOLUTION ORAL at 05:47

## 2023-01-01 RX ADMIN — ACETAMINOPHEN 650 MG: 325 SOLUTION ORAL at 00:18

## 2023-01-01 RX ADMIN — HEPARIN SODIUM 5000 UNITS: 5000 INJECTION, SOLUTION INTRAVENOUS; SUBCUTANEOUS at 10:16

## 2023-01-01 RX ADMIN — AMPICILLIN SODIUM AND SULBACTAM SODIUM 3 G: 2; 1 INJECTION, POWDER, FOR SOLUTION INTRAMUSCULAR; INTRAVENOUS at 05:42

## 2023-01-01 RX ADMIN — BARICITINIB 1 MG: 1 TABLET, FILM COATED ORAL at 20:58

## 2023-01-01 RX ADMIN — ALBUTEROL SULFATE 2 PUFF: 90 AEROSOL, METERED RESPIRATORY (INHALATION) at 20:59

## 2023-01-01 RX ADMIN — ACETAMINOPHEN 650 MG: 325 SOLUTION ORAL at 12:33

## 2023-01-01 RX ADMIN — CARVEDILOL 12.5 MG: 12.5 TABLET, FILM COATED ORAL at 19:00

## 2023-01-01 RX ADMIN — BARICITINIB 1 MG: 1 TABLET, FILM COATED ORAL at 10:14

## 2023-01-01 RX ADMIN — SODIUM CHLORIDE 50 ML: 9 INJECTION, SOLUTION INTRAVENOUS at 18:16

## 2023-01-01 RX ADMIN — HEPARIN SODIUM 5000 UNITS: 5000 INJECTION, SOLUTION INTRAVENOUS; SUBCUTANEOUS at 20:57

## 2023-01-01 RX ADMIN — LEVOTHYROXINE SODIUM 88 MCG: 88 TABLET ORAL at 10:14

## 2023-01-01 RX ADMIN — ACETAMINOPHEN 650 MG: 325 SOLUTION ORAL at 18:13

## 2023-01-01 RX ADMIN — DEXAMETHASONE SODIUM PHOSPHATE 6 MG: 10 INJECTION, SOLUTION INTRAMUSCULAR; INTRAVENOUS at 12:33

## 2023-01-01 RX ADMIN — CARVEDILOL 12.5 MG: 12.5 TABLET, FILM COATED ORAL at 10:10

## 2023-01-01 RX ADMIN — ACETAMINOPHEN 650 MG: 325 SOLUTION ORAL at 23:33

## 2023-01-01 RX ADMIN — HEPARIN SODIUM 5000 UNITS: 5000 INJECTION, SOLUTION INTRAVENOUS; SUBCUTANEOUS at 09:47

## 2023-01-01 RX ADMIN — AMOXICILLIN AND CLAVULANATE POTASSIUM 1 TABLET: 500; 125 TABLET, FILM COATED ORAL at 19:00

## 2023-01-01 RX ADMIN — AMPICILLIN SODIUM AND SULBACTAM SODIUM 3 G: 2; 1 INJECTION, POWDER, FOR SOLUTION INTRAMUSCULAR; INTRAVENOUS at 17:40

## 2023-01-01 RX ADMIN — BARICITINIB 1 MG: 1 TABLET, FILM COATED ORAL at 09:48

## 2023-01-01 ASSESSMENT — ACTIVITIES OF DAILY LIVING (ADL)
CURRENT_FUNCTION: MONEY MANAGEMENT REQUIRES ASSISTANCE
ADLS_ACUITY_SCORE: 41
EATING/SWALLOWING: SWALLOWING SOLID FOOD
EATING: 0-->INDEPENDENT
CURRENT_FUNCTION: SHOPPING REQUIRES ASSISTANCE
WEAR_GLASSES_OR_BLIND: YES
ADLS_ACUITY_SCORE: 44
EATING/SWALLOWING_MANAGEMENT: PRECAUTIONS
CURRENT_FUNCTION: TELEPHONE REQUIRES ASSISTANCE
ADLS_ACUITY_SCORE: 45
ADLS_ACUITY_SCORE: 44
CURRENT_FUNCTION: PREPARING MEALS REQUIRES ASSISTANCE
VISION_MANAGEMENT: GLASSES
ADLS_ACUITY_SCORE: 47
SWALLOWING: 2-->DIFFICULTY SWALLOWING FOODS
TOILETING_ISSUES: NO
DIFFICULTY_EATING/SWALLOWING: YES
DRESSING/BATHING_DIFFICULTY: YES
ADLS_ACUITY_SCORE: 44
ADLS_ACUITY_SCORE: 41
ADLS_ACUITY_SCORE: 44
ADLS_ACUITY_SCORE: 41
CONCENTRATING,_REMEMBERING_OR_MAKING_DECISIONS_DIFFICULTY: YES
ADLS_ACUITY_SCORE: 47
ADLS_ACUITY_SCORE: 47
ADLS_ACUITY_SCORE: 41
ADLS_ACUITY_SCORE: 41
WALKING_OR_CLIMBING_STAIRS_DIFFICULTY: YES
ADLS_ACUITY_SCORE: 41
DEPENDENT_IADLS:: CLEANING;COOKING;LAUNDRY;SHOPPING;MEAL PREPARATION;MEDICATION MANAGEMENT;MONEY MANAGEMENT;TRANSPORTATION
ADLS_ACUITY_SCORE: 44
CURRENT_FUNCTION: MEDICATION ADMINISTRATION REQUIRES ASSISTANCE
ADLS_ACUITY_SCORE: 44
ADLS_ACUITY_SCORE: 41
EQUIPMENT_CURRENTLY_USED_AT_HOME: SHOWER CHAIR;WALKER, STANDARD
ADLS_ACUITY_SCORE: 44
ADLS_ACUITY_SCORE: 35
FALL_HISTORY_WITHIN_LAST_SIX_MONTHS: NO
ADLS_ACUITY_SCORE: 41
ADLS_ACUITY_SCORE: 41
ADLS_ACUITY_SCORE: 35
CURRENT_FUNCTION: TRANSPORTATION REQUIRES ASSISTANCE
EATING: 0-->ASSISTANCE NEEDED (DEVELOPMENTALLY APPROPRIATE)
ADLS_ACUITY_SCORE: 41
DRESSING/BATHING: BATHING DIFFICULTY, REQUIRES EQUIPMENT
SWALLOWING: 2-->DIFFICULTY SWALLOWING FOODS
ADLS_ACUITY_SCORE: 41
ADLS_ACUITY_SCORE: 47
CURRENT_FUNCTION: HOUSEWORK REQUIRES ASSISTANCE
CHANGE_IN_FUNCTIONAL_STATUS_SINCE_ONSET_OF_CURRENT_ILLNESS/INJURY: YES
WALKING_OR_CLIMBING_STAIRS: AMBULATION DIFFICULTY, REQUIRES EQUIPMENT;TRANSFERRING DIFFICULTY, ASSISTANCE 1 PERSON
DOING_ERRANDS_INDEPENDENTLY_DIFFICULTY: YES
CURRENT_FUNCTION: LAUNDRY REQUIRES ASSISTANCE

## 2023-01-01 ASSESSMENT — ENCOUNTER SYMPTOMS
DIZZINESS: 0
HEARTBURN: 0
WEAKNESS: 0
HEMATURIA: 0
HEMATOCHEZIA: 0
HEADACHES: 0
DIARRHEA: 0
FEVER: 0
NERVOUS/ANXIOUS: 0
ARTHRALGIAS: 0
CHILLS: 0
CONSTIPATION: 0
MYALGIAS: 0
PALPITATIONS: 0
BREAST MASS: 0
PARESTHESIAS: 0
DYSURIA: 0
JOINT SWELLING: 0
ABDOMINAL PAIN: 0
EYE PAIN: 0
FREQUENCY: 0
SHORTNESS OF BREATH: 0
SORE THROAT: 0
COUGH: 0
NAUSEA: 0

## 2023-01-13 NOTE — TELEPHONE ENCOUNTER
"Last Written Prescription Date:  12/12/21  Last Fill Quantity: 90,  # refills: 3   Last office visit provider:  2/7/22     Requested Prescriptions   Pending Prescriptions Disp Refills     levothyroxine (SYNTHROID/LEVOTHROID) 75 MCG tablet [Pharmacy Med Name: Levothyroxine Sodium Oral Tablet 75 MCG] 90 tablet 0     Sig: Take 1 tablet (75 mcg total) by mouth daily. replaces the 88 mcg dose       Thyroid Protocol Passed - 1/13/2023 11:27 AM        Passed - Patient is 12 years or older        Passed - Recent (12 mo) or future (30 days) visit within the authorizing provider's specialty     Patient has had an office visit with the authorizing provider or a provider within the authorizing providers department within the previous 12 mos or has a future within next 30 days. See \"Patient Info\" tab in inbasket, or \"Choose Columns\" in Meds & Orders section of the refill encounter.              Passed - Medication is active on med list        Passed - Normal TSH on file in past 12 months     Recent Labs   Lab Test 02/07/22  1022   TSH 3.66              Passed - No active pregnancy on record     If patient is pregnant or has had a positive pregnancy test, please check TSH.          Passed - No positive pregnancy test in past 12 months     If patient is pregnant or has had a positive pregnancy test, please check TSH.               Barber Sheth RN 01/13/23 11:27 AM  "

## 2023-03-18 NOTE — TELEPHONE ENCOUNTER
"Routing refill request to provider for review/approval because:  Patient needs to be seen because it has been more than 1 year since last office visit.    carvedilol  Last Written Prescription Date:  3/28/22  Last Fill Quantity: 180,  # refills: 3   Last office visit provider:  2/7/22     memantine  Last Written Prescription Date:  3/28/22  Last Fill Quantity: 180,  # refills: 3   Last office visit provider:  2/7/22     Requested Prescriptions   Pending Prescriptions Disp Refills     carvedilol (COREG) 12.5 MG tablet [Pharmacy Med Name: Carvedilol Oral Tablet 12.5 MG] 180 tablet 0     Sig: Take 1 tablet (12.5 mg total) by mouth 2 (two) times a day.       Beta-Blockers Protocol Failed - 3/18/2023  2:47 AM        Failed - Recent (12 mo) or future (30 days) visit within the authorizing provider's specialty     Patient has had an office visit with the authorizing provider or a provider within the authorizing providers department within the previous 12 mos or has a future within next 30 days. See \"Patient Info\" tab in inbasket, or \"Choose Columns\" in Meds & Orders section of the refill encounter.              Passed - Blood pressure under 140/90 in past 12 months     BP Readings from Last 3 Encounters:   10/26/22 119/58   04/26/22 105/56   04/12/22 (!) 152/72                 Passed - Patient is age 6 or older        Passed - Medication is active on med list           memantine (NAMENDA) 10 MG tablet [Pharmacy Med Name: Memantine HCl Oral Tablet 10 MG] 180 tablet 0     Sig: TAKE 1 TABLET BY MOUTH TWICE DAILY.       Miscellaneous Dementia Agents Failed - 3/18/2023  2:47 AM        Failed - Recent (12 mo) or future (30 days) visit within the authorizing provider's specialty     Patient has had an office visit with the authorizing provider or a provider within the authorizing providers department within the previous 12 mos or has a future within next 30 days. See \"Patient Info\" tab in inbasket, or \"Choose Columns\" in Meds & " Orders section of the refill encounter.              Passed - Medication is active on med list        Passed - Patient is 18 years of age or older             Dominique Wiggins RN 03/18/23 2:48 AM

## 2023-04-04 PROBLEM — R15.9 RECTAL LEAKAGE: Status: ACTIVE | Noted: 2023-01-01

## 2023-04-04 PROBLEM — K62.89 MASS IN RECTUM: Status: ACTIVE | Noted: 2023-01-01

## 2023-04-04 NOTE — PATIENT INSTRUCTIONS
Annual flu shot every fall    Recommending tetanus vaccine, Td.  This can be obtained at your pharmacy    Annual eye exam with your ophthalmologist/optometrist    Appointment with dermatology every 1 to 2 years for total-body skin exam is recommended    I would recommend scheduling appointment [facility] [phone] to have small rectal lesion/mass evaluated.    Recommending adding 1 teaspoon of Metamucil daily with large glass of water    Consider repeating DEXA in 2024 for osteoporosis follow-up    Using a walker whenever up ambulating is recommended      Patient Education   Personalized Prevention Plan  You are due for the preventive services outlined below.  Your care team is available to assist you in scheduling these services.  If you have already completed any of these items, please share that information with your care team to update in your medical record.  Health Maintenance Due   Topic Date Due    Diptheria Tetanus Pertussis (DTAP/TDAP/TD) Vaccine (2 - Td or Tdap) 06/08/2022    Thyroid Function Lab  02/07/2023     Activities of Daily Living    Your Health Risk Assessment indicates you have difficulties with activities of daily living such as housework, bathing, preparing meals, taking medication, etc. Your provider addressed this with you at today's appointment.       Exercise for a Healthier Heart  You may wonder how you can improve the health of your heart. If you re thinking about exercise, you re on the right track. You don t need to become an athlete. But you do need a certain amount of brisk exercise to help strengthen your heart. If you have been diagnosed with a heart condition, your healthcare provider may advise exercise to help your condition. To help make exercise a habit, choose safe, fun activities.      Exercise with a friend. When activity is fun, you're more likely to stick with it.     Before you start  Check with your healthcare provider before starting an  exercise program. This is especially important if you haven't been active for a while. It's also important if you have a long-term (chronic) health problem such as heart disease, diabetes, or obesity. Also check with your provider if you're at high risk for having these problems.   Why exercise?  Exercising regularly offers many healthy rewards. It can help you do all of these:   Improve your blood cholesterol level to help prevent further heart trouble.  Lower your blood pressure to help prevent a stroke or heart attack.  Control diabetes or reduce your risk of getting this disease.  Improve your heart and lung function.  Reach and stay at a healthy weight.  Make your muscles stronger so you can stay active.  Prevent falls and fractures by slowing the loss of bone mass (osteoporosis).  Manage stress better.  Improve your sense of self and your body image.  Exercise tips    Ease into your routine. Set small goals. Then build on them. Talk with your healthcare provider first before starting an exercise routine if you're not sure what your activity level should be.  Exercise on most days. Aim for a total of at least 150 minutes (2 hours and 30 minutes) or more of moderate-intensity aerobic activity each week. You could also do 75 minutes (1 hour and 15 minutes) or more of vigorous-intensity aerobic activity each week. Or try for a combination of both. Moderate activity means that you breathe heavier and your heart rate increases, but you can still talk. Think about doing at least 30 minutes of moderate exercise, 5 times a week. It's OK to work up to the 30-minute period over time. Examples of moderate-intensity activity are brisk walking, gardening, and water aerobics.  Step up your daily activity level.  Along with your exercise program, try being more active the whole day. Walk instead of drive. Or park further away so that you take more steps each day. Do more household tasks or yard work. You may not be able to  meet the advised amount of physical activity. But doing some moderate- or vigorous-intensity aerobic activity can help reduce your risk for heart disease. Your healthcare provider can help you figure out what is best for you.  Choose 1 or more activities you enjoy.  Walking is one of the easiest things you can do. You can also try swimming, riding a bike, dancing, or taking an exercise class.    Call 911  Call 911 right away if any of these occur:   Chest pain that doesn't go away quickly with rest  New burning, tightness, pressure, or heaviness in your chest, neck, shoulders, back, or arms  Abnormal or severe shortness of breath  A very fast or irregular heartbeat (palpitations)  Fainting  When to call your healthcare provider  Call your healthcare provider if you have any of these:   Dizziness or lightheadedness  Mild shortness of breath or chest pain  Increased or new joint or muscle pain    Teri last reviewed this educational content on 7/1/2022 2000-2022 [employer]. All rights reserved. This information is not intended as a substitute for professional medical care. Always follow your healthcare professional's instructions.          Preventing Falls at Home  A person can fall for many reasons. Older adults may fall because reaction time slows down as we age. Your muscles and joints may get stiff, weak, or less flexible because of illness, medicines, or a physical condition.   Other health problems that make falls more likely include:   Arthritis  Dizziness or lightheadedness when you stand up (orthostatic hypotension)  History of a stroke  Dizziness  Anemia  Certain medicines taken for mental illness or to control blood pressure.  Problems with balance or gait  Bladder or urinary problems  History of falling  Changes in vision (vision impairment)  Changes in thinking skills and memory (cognitive impairment)  Injuries from a fall can include serious injuries such as broken bones, dislocated  joints, internal bleeding and cuts. Injuries like these can limit your independence.   Prevention tips  To help prevent falls and fall-related injuries, follow the tips below.    Floors  To make floors safer:   Put nonskid pads under area rugs.  Remove small rugs.  Replace worn floor coverings.  Tack carpets firmly to each step on carpeted stairs. Put nonskid strips on the edges of uncarpeted stairs.  Keep floors and stairs free of clutter and cords.  Arrange furniture so there are clear pathways.  Clean up any spills right away.  Bathrooms    To make bathrooms safer:   Install grab bars in the tub or shower.  Apply nonskid strips or put a nonskid rubber mat in the tub or shower.  Sit on a bath chair to bathe.  Use bathmats with nonskid backing.  Lighting  To improve visibility in your home:    Keep a flashlight in each room. Or put a lamp next to the bed within easy reach.  Put nightlights in the bedrooms, hallways, kitchen, and bathrooms.  Make sure all stairways have good lighting.  Take your time when going up and down stairs.  Put handrails on both sides of stairs and in walkways for more support. To prevent injury to your wrist or arm, don t use handrails to pull yourself up.  Install grab bars to pull yourself up.  Move or rearrange items that you use often. This will make them easier to find or reach.  Look at your home to find any safety hazards. Especially look at doorways, walkways, and the driveway. Remove or repair any safety problems that you find.  Other changes to make  Look around to find any safety hazards. Look closely at doorways, walkways, and the driveway. Remove or repair any safety problems that you find.  Wear shoes that fit well.  Take your time when going up and down stairs.  Put handrails on both sides of stairs and in walkways for more support. To prevent injury to your wrist or arm, don t use handrails to pull yourself up.  Install grab bars wherever needed to pull yourself up.  Arrange  items that you use often. This will make them easier to find or reach.    [employer] last reviewed this educational content on 3/1/2020    5894-7750 [employer]. All rights reserved. This information is not intended as a substitute for professional medical care. Always follow your healthcare professional's instructions.

## 2023-04-04 NOTE — PROGRESS NOTES
"SUBJECTIVE:   Karen Roper is a 88 year old who presents for Preventive Visit.    [id number] woman here for annual wellness visit and to follow-up medical problems including dementia, ischemic cardiomyopathy, chronic low back pain, hypertension, osteoporosis and hypothyroidism.  He is also concerns about some mild stool incontinence/fecal smearing and small mass in rectal area.  See assessment plan for details.          4/4/2023     2:10 PM   Additional Questions   Roomed by    Patient has been advised of split billing requirements and indicates understanding: Yes  Are you in the first 12 months of your Medicare coverage?  No      60 minutes spent in the management of this patient including reviewing records, obtaining history, performing exam, ordering appropriate tests and documenting visit.    20 of those minutes spent addressing wellness and 40 minutes spent addressing chronic and new medical problems.  See assessment plan for details.    Healthy Habits:     In general, how would you rate your overall health?  Good    Frequency of exercise:  2-3 days/week    Duration of exercise:  Less than 15 minutes    Do you usually eat at least 4 servings of fruit and vegetables a day, include whole grains    & fiber and avoid regularly eating high fat or \"junk\" foods?  Yes    Taking medications regularly:  Yes    Medication side effects:  None    Ability to successfully perform activities of daily living:  Telephone requires assistance, transportation requires assistance, shopping requires assistance, preparing meals requires assistance, housework requires assistance, laundry requires assistance, medication administration requires assistance and money management requires assistance    Home Safety:  No safety concerns identified    Hearing Impairment:  No hearing concerns    In the past 6 months, have you been bothered by leaking of urine? Yes    In general, how would you rate your overall mental or emotional health?  " Good      PHQ-2 Total Score: 0    Additional concerns today:  Yes      Have you ever done Advance Care Planning? (For example, a Health Directive, POLST, or a discussion with a medical provider or your loved ones about your wishes): Yes, advance care planning is on file.       Fall risk  Fallen 2 or more times in the past year?: No  Any fall with injury in the past year?: No    Cognitive Screening Not appropriate due to known dementia    Do you have sleep apnea, excessive snoring or daytime drowsiness?: no    Reviewed and updated as needed this visit by clinical staff   Tobacco  Allergies  Meds  Problems  Med Hx  Surg Hx  Fam Hx          Reviewed and updated as needed this visit by Provider   Tobacco  Allergies  Meds  Problems  Med Hx  Surg Hx  Fam Hx         Social History     Tobacco Use     Smoking status: Never     Smokeless tobacco: Never   Vaping Use     Vaping status: Not on file   Substance Use Topics     Alcohol use: Yes     Alcohol/week: 1.0 standard drink of alcohol     Comment: Alcoholic Drinks/day: glass of wine with dinner             4/4/2023     2:05 PM   Alcohol Use   Prescreen: >3 drinks/day or >7 drinks/week? No     Do you have a current opioid prescription? No  Do you use any other controlled substances or medications that are not prescribed by a provider? None              Current providers sharing in care for this patient include:   Patient Care Team:  Kehinde Tate MD as PCP - General  Norberto Ochoa, PharmD as Pharmacist (Pharmacist)  Kehinde Tate MD as Assigned PCP  Ginny Mcclendon DO as Assigned Neuroscience Provider    The following health maintenance items are reviewed in Epic and correct as of today:  Health Maintenance   Topic Date Due     DTAP/TDAP/TD IMMUNIZATION (2 - Td or Tdap) 06/08/2022     TSH W/FREE T4 REFLEX  02/07/2023     MEDICARE ANNUAL WELLNESS VISIT  04/04/2024     ANNUAL REVIEW OF HM ORDERS  04/04/2024     FALL RISK ASSESSMENT   "04/04/2024     ADVANCE CARE PLANNING  04/04/2028     PHQ-2 (once per calendar year)  Completed     INFLUENZA VACCINE  Completed     Pneumococcal Vaccine: 65+ Years  Completed     ZOSTER IMMUNIZATION  Completed     COVID-19 Vaccine  Completed     IPV IMMUNIZATION  Aged Out     MENINGITIS IMMUNIZATION  Aged Out     Lab work is in process        Pertinent mammograms are reviewed under the imaging tab.    Review of Systems   Constitutional: Negative for chills and fever.   HENT: Negative for congestion, ear pain, hearing loss and sore throat.    Eyes: Negative for pain and visual disturbance.   Respiratory: Negative for cough and shortness of breath.    Cardiovascular: Negative for chest pain, palpitations and peripheral edema.   Gastrointestinal: Negative for abdominal pain, constipation, diarrhea, heartburn, hematochezia and nausea.   Breasts:  Negative for tenderness, breast mass and discharge.   Genitourinary: Negative for dysuria, frequency, genital sores, hematuria, pelvic pain, urgency, vaginal bleeding and vaginal discharge.   Musculoskeletal: Negative for arthralgias, joint swelling and myalgias.   Skin: Negative for rash.   Neurological: Negative for dizziness, weakness, headaches and paresthesias.   Psychiatric/Behavioral: Negative for mood changes. The patient is not nervous/anxious.          OBJECTIVE:   /80 (BP Location: Right arm, Patient Position: Sitting, Cuff Size: Adult Regular)   Pulse 72   Resp 16   Ht 1.588 m (5' 2.5\")   Wt 56.4 kg (124 lb 6.4 oz)   BMI 22.39 kg/m   Estimated body mass index is 22.39 kg/m  as calculated from the following:    Height as of this encounter: 1.588 m (5' 2.5\").    Weight as of this encounter: 56.4 kg (124 lb 6.4 oz).  Physical Exam  EYES: Eyelids, conjunctiva, and sclera were normal. Pupils were normal. Cornea, iris, and lens were normal bilaterally.  HEAD, EARS, NOSE, MOUTH, AND THROAT: Head and face were normal. TMs and external auditory canals are " normal  NECK: Neck appearance was normal. There were no neck masses and the thyroid was not enlarged and no nodules are felt.  No lymphadenopathy.  RESPIRATORY: Breathing pattern was normal and the chest moved symmetrically.   Lung sounds were normal and there were no rales or wheezes.  CARDIOVASCULAR: Heart rate and rhythm were normal.  S1 and S2 were normal and there were no extra sounds or murmurs. Peripheral pulses in arms and legs were normal.  There was no peripheral edema.    GASTROINTESTINAL:  Bowel sounds were present.   Palpation detected no tenderness, mass, or enlarged organs.   RECTAL/PROSTATE: Small erythematous papular lesion which may represent hemorrhoid but cannot exclude small polyp  MUSCULOSKELETAL: Skeletal configuration was normal and muscle mass was normal for age. Joint appearance was overall normal.  LYMPHATIC: There were no enlarged nodes.  SKIN/HAIR/NAILS: Skin color was normal.  There were no concerning skin lesions.  NEUROLOGIC: Motor and cranial nerves intact.  Obvious cognitive impairment.  Forgetful.  PSYCHIATRIC: Mood appears good        ASSESSMENT / PLAN:     1. Encounter for Medicare annual wellness exam  Immunizations are reviewed and recommending getting a Td vaccine updated.  She has a living will.  Healthcare directive was brought in today.  Non-smoker.  Uses alcohol in moderation.  Regular exercise and good diet habits discussed.  Suggesting using a pedal machine.  Further colonoscopy is not recommended at her age and with her comorbidities as risks outweigh benefit.  She declines any further breast cancer screening with mammogram.   Last DEXA was 2022 and should be repeated next year.  Depression screening completed but unable to perform dementia screening as she already has significant cognitive impairment.  Recommending annual eye exam.  Recommending seeing a dentist every 6 months.  Skin exam performed and recommending regular use of sunblock.  Suggesting seeing a  dermatologist every 1 to 2 years for total-body skin exam.  Will screen for diabetes with fasting glucose.     2. Alzheimer's dementia without behavioral disturbance (H)  Alzheimer's dementia with steady progression.   and family providing supervision 24/7.  She is needing help with most of her ADLs although is still able to take care of most of her toiletry needs.  Continues on Namenda 10 mg twice daily.  Unable to tolerate Aricept or Exelon.  - memantine (NAMENDA) 10 MG tablet; Take 1 tablet (10 mg) by mouth 2 times daily  Dispense: 180 tablet; Refill: 3    3. Chronic bilateral low back pain with bilateral sciatica  Chronic low back pain followed by spine clinic.  Underwent radiofrequency ablation last year with partial benefit.  She is able to manage with combination of acetaminophen and nabumetone.  - nabumetone (RELAFEN) 750 MG tablet; Take 1 tablet (750 mg total) by mouth 2 (two) times a day.  Dispense: 180 tablet; Refill: 3    4. Nonischemic cardiomyopathy (H)  Appears well compensated with current medical management  - carvedilol (COREG) 12.5 MG tablet; Take 1 tablet (12.5 mg total) by mouth 2 (two) times a day.  Dispense: 180 tablet; Refill: 3    5. NSVT (nonsustained ventricular tachycardia) (H)  Continue carvedilol    6. Essential hypertension  Good blood pressure control with current medication  - CBC with platelets; Future  - Comprehensive metabolic panel (BMP + Alb, Alk Phos, ALT, AST, Total. Bili, TP); Future    7. ASCVD (arteriosclerotic cardiovascular disease)  Coronary angiogram with moderate disease.  Continue medical management with aspirin, statin, carvedilol, and good blood pressure control.  Denies any exertional chest pain.    8. Age-related osteoporosis without current pathological fracture  Previous hip fracture.  Completed 5 years of Prolia in February 2020.  DEXA in 2022 stable although T score of -3.1 radius which was not previously evaluated.  Discussed the importance of  maintaining good calcium and vitamin D intake and will recheck vitamin D level.  We will plan to repeat DEXA in 2024 to confirm ongoing stability and decide whether to restart Prolia if decline is seen including in radius.    9. Hypothyroidism, unspecified type  Recheck TSH and adjust dose of levothyroxine as needed  - levothyroxine (SYNTHROID/LEVOTHROID) 75 MCG tablet; Take 1 tablet (75 mcg total) by mouth daily. replaces the 88 mcg dose  Dispense: 90 tablet; Refill: 3  - TSH; Future    10. History of skin cancer  Skin cancer was removed from her left dorsum of hand last year.  We discussed seeing a dermatologist every 1 to 2 years    11. Vitamin D deficiency  Rechecking vitamin D level  - Vitamin D deficiency screening; Future    12. Mass in rectum  Daughter raises concerns for abnormality in rectal area associated with some mild incontinence/fecal smearing.  Exam shows small erythematous papule that is possibly just a hemorrhoid but I cannot exclude this being a polyp or even rectal cancer.  She would not be a candidate for any interventions but to help clarify things I would like her to see a colon rectal surgeon.  - Adult Colorectal Surgery  Referral; Future    13. Fecal smearing  As above.  Also recommending using 1 teaspoon of Metamucil daily.  Already using MiraLAX every day.    14. Hyperlipidemia, unspecified hyperlipidemia type  Recheck lipid profile taking rosuvastatin  - Lipid Profile (Chol, Trig, HDL, LDL calc); Future    15.  Mild anemia  Hemoglobin 11.3 not significantly changed over the last 2 years.  However, will check vitamin B12 level especially with MCV of 100.  We will also check iron levels.              She reports that she has never smoked. She has never used smokeless tobacco.      Appropriate preventive services were discussed with this patient, including applicable screening as appropriate for cardiovascular disease, diabetes, osteopenia/osteoporosis, and glaucoma.  As  appropriate for age/gender, discussed screening for colorectal cancer, prostate cancer, breast cancer, and cervical cancer. Checklist reviewing preventive services available has been given to the patient.    Reviewed patients plan of care and provided an AVS. The Basic Care Plan (routine screening as documented in Health Maintenance) for Karen Roper meets the Care Plan requirement. This Care Plan has been established and reviewed with the daughter.          Kehinde Tate MD  [facility]    Identified Health Risks:      The patient reports that she has difficulty with activities of daily living. This issue was addressed at today's appointment.  Family providing help with ADLs  She is at risk for lack of exercise and has been provided with information to increase physical activity for the benefit of her well-being.  She is at risk for falling and has been provided with information to reduce the risk of falling at home.

## 2023-05-15 NOTE — TELEPHONE ENCOUNTER
Prior Authorization Request   Who s requesting:  Patient  Ph[facility] and [id number]  Medication Name: lansoprazole 30mg  Insurance Plan: [facility]      Patient message:  [employer] sent a letter that I need my doctor, Kehinde Tate, to renew my prior approval for Lansoprazole 30 mg. They say to have the MD visit [email or web address] or call [phone]. It claims this is for physicians only. The approval runs out 5/31/23. Thank you for your help with this matter.

## 2023-05-19 NOTE — TELEPHONE ENCOUNTER
Prior Authorization Approval    Medication: LANSOPRAZOLE 30 MG PO CPDR  Authorization Effective Date:  04/19/2023  Authorization Expiration Date:  05/18/2024  Approved Dose/Quantity: 90  Reference #:     Insurance Company: [employer] - Phone [phone] Fax [phone]  Expected CoPay:       CoPay Card Available:      Financial Assistance Needed:   Which Pharmacy is filling the prescription: [id number] - [place], [place] - [address]  Pharmacy Notified: Yes  Patient Notified: Yes

## 2023-08-14 NOTE — TELEPHONE ENCOUNTER
"Routing refill request to provider for review/approval because:  Has diagnosis of osteoporosis    Last Written Prescription Date:  8/29/2022  Last Fill Quantity: 90,  # refills: 3   Last office visit provider:  4/4/2023     Requested Prescriptions   Pending Prescriptions Disp Refills    LANsoprazole (PREVACID) 30 MG DR capsule [Pharmacy Med Name: Lansoprazole Oral Capsule Delayed Release 30 MG] 90 capsule 0     Sig: Take 1 capsule (30 mg total) by mouth daily.       PPI Protocol Failed - 8/14/2023  2:00 AM        Failed - No diagnosis of osteoporosis on record        Passed - Not on Clopidogrel (unless Pantoprazole ordered)        Passed - Recent (12 mo) or future (30 days) visit within the authorizing provider's specialty     Patient has had an office visit with the authorizing provider or a provider within the authorizing providers department within the previous 12 mos or has a future within next 30 days. See \"Patient Info\" tab in inbasket, or \"Choose Columns\" in Meds & Orders section of the refill encounter.              Passed - Medication is active on med list        Passed - Patient is age 18 or older        Passed - No active pregnacy on record        Passed - No positive pregnancy test in past 12 months             Ginny Smith RN 08/14/23 2:21 AM  "

## 2023-10-06 NOTE — PROGRESS NOTES
"Karen Roper is a 88 year old who is being evaluated via a billable telephone visit.      What phone number would you like to be contacted at? [phone]  How would you like to obtain your AVS? Rubén    Distant Location (provider location):  On-site    Assessment & Plan     Infection due to 2019 novel coronavirus  Last GFR in April of this year was only 34.  We will avoid Paxlovid because of this.     - molnupiravir (LAGEVRIO) 200 MG capsule; Take 4 capsules (800 mg) by mouth every 12 hours for 5 days      COVID-19 positive patient.  Encounter for consideration of medication intervention. Patient does qualify for a prescription. Full discussion with patient including medication options, risks and benefits. Potential drug interactions reviewed with patient.     Treatment Planned  Molnupiravir sent to [facility] pharmacy in [place]    Temporary change to home medications:  None     Estimated body mass index is 22.39 kg/m  as calculated from the following:    Height as of 4/4/23: 1.588 m (5' 2.5\").    Weight as of 4/4/23: 56.4 kg (124 lb 6.4 oz).  GFR Estimate   Date Value Ref Range Status   04/04/2023 34 (L) >60 mL/min/1.73m2 Final     Comment:     eGFR calculated using 2021 CKD-EPI equation.   02/02/2021 43 (L) >60 mL/min/1.73m2 Final     No results found for: [id number]    No follow-ups on file.    Virtual Urgent Care  [facility]    Subjective   Karen Roper is a 88 year old, presenting for the following health issues:  No chief complaint on file.      HPI       COVID-19 Symptom Review  How many days ago did these symptoms start? Yesterday. Tested positive at home.  has COVID    Are any of the following symptoms significant for you?  New or worsening difficulty breathing? No  Worsening cough? Yes, it's a dry cough.   Fever or chills? No  Headache: No  Sore throat: No  Chest pain: No  Diarrhea: No  Body aches? No    What treatments has patient tried? Acetaminophen   Does patient live in a " nursing home, group home, or shelter? No  Does patient have a way to get food/medications during quarantined? Yes, I have a friend or family member who can help me.                Review of Systems   Constitutional, HEENT, cardiovascular, pulmonary, gi and gu systems are negative, except as otherwise noted.      Objective           Vitals:  No vitals were obtained today due to virtual visit.    Physical Exam   And visit was done with son and her .  Antonella has Alzheimer's              Phone call duration: 10 minutes

## 2023-10-06 NOTE — PATIENT INSTRUCTIONS
For informational purposes only. Not to replace the advice of your health care provider. Copyright   2022 [facility]. All rights reserved. Clinically reviewed by Vanessa Hicks, PharmD, BCACP. [email or web address] [id number] - REV 06/23.  COVID-19 Outpatient Treatments  Your care team can help you find the best treatments for COVID-19. Talk to a health care provider or refer to the FDA medicine fact sheets below.  Paxlovid (nirmatrelvir and ritonavir): https://www.paxlovid.[employer]/resources  Molnupiravir (Lagevrio): https://www.fda.gov/media/371276/download  Important: We can only prescribe Paxlovid or Molnupiravir when it can be started within 5 days of first having symptoms.  Paxlovid (nimatrelvir and ritonavir)  How it works  Two medicines (nirmatrelvir and ritonavir) are taken together. They stop the virus from growing. Less amount of virus is easier for your body to fight.  Benefits  Lowers risk of a hospital stay or death from COVID-19.  How to take  Medicine comes in a daily container with both medicine tablets. Take by mouth twice daily (once in the morning, once at night) for 5 days.  The number of tablets to take varies by patient.  Don't chew or break capsules. Swallow whole.  When to take  Take it as soon as possible and within 5 days of your first symptoms.  Who can take it  Patients must be 12 years or older weigh at least 88 pounds. Paxlovid is the preferred treatment for pregnant patients.  Possible side effects  Can cause altered sense of taste, diarrhea (loose, watery stools), high blood pressure, muscle aches.  Medicine conflicts  Some medicines may conflict with Paxlovid and may cause serious side effects.  Tell your care team about all the medicines you take, including prescription and over-the-counter medicines, vitamins, and herbal supplements.  Your care team will review your medicines to make sure that you can safely take Paxlovid.  Cautions  Paxlovid is not advised for patients with severe  kidney or liver disease. If you have kidney or liver problems, the dose may need to be changed.  If you're pregnant or breastfeeding, talk to your care team about your options.  If you take hormonal birth control (such as the Pill), then you or your partner should also use a non-hormonal form of birth control (such as a condom). Keep doing this for 1 menstrual cycle after your last dose of Paxlovid.  Molnupiravir (lagevrio)  How it works  Stops the virus from growing. Less amount of virus is easier for your body to fight.  Benefits  Lowers risk of a hospital stay or death from COVID-19.  How to take  Take 4 capsules by mouth every 12 hours (4 in the morning and 4 at night) for 5 days. Don't chew or break capsules. Swallow whole.  When to take  Take as soon as possible and within 5 days of your first symptoms.  Who can take it  Patients must be 18 years or older.   Possible side effects  Diarrhea (loose, watery stools), nausea (feeling sick to your stomach), dizziness, headaches.  Medicine conflicts  Right now, there are no known conflicts with other drugs. But tell your care team about all medicines you take.  Cautions  This medicine is not advised for patients who are pregnant.  If you are someone who could become pregnant, use trusted birth control until 4 days after your last dose of molnupiravir.  If your partner could become pregnant, you should use trusted birth control until 3 months after your last dose of molnupiravir.      Coping with Life After COVID-19  Being in the hospital because of COVID-19 is scary. Going home can be scary, too. You may face changes to your life, the way you work or what you can eat. It s hard to adjust to change, and it s normal to feel afraid, frustrated or even angry. These feelings usually go away over time. If your feelings don t start to get better, it s called  adjustment disorder.      What signs should I look out for?  Adjustment disorder can happen to anyone in a time of  stress. It makes it hard to cope with daily life. You may feel lonely or fight with loved ones, even if you re glad to be home. Watch for these signs:  Fear or worry  Hard time focusing  Sadness or anger  Trouble talking to family or friends  Feeling like you don t fit in or isolating yourself  Problems with sleep   Drinking alcohol or taking drugs to cope    What can I do?  You can help yourself get better. Feeling you have control helps you move forward. You may wonder if you ll be able to do things you did before. Be patient. Do your best to make the most of every day. Try to build relationships, be as active as you can, eat right and keep a sense of humor. Avoid smoking and drinking too much alcohol. Call your family doctor or clinic if you re not sure what to do. They can guide you to care or other services.    When should I get help?  Think about getting counseling if your sadness or frustration gets worse. Together with a trained counselor, you can talk about your problems adjusting to life after your hospital stay. You can come up with new ways to handle changes that give you more control. Your family doctor or care team can help you find a counselor.     Get help if you re thinking about hurting yourself. If you need help right away, call 911 or go to the nearest emergency room. You can also try the Crisis Text Line.    Crisis Text Line: 475-010 (http://www.crisistextline.org)  The Crisis Text Line serves anyone, in any crisis. It gives free, 24/7 support. Here's how it works:  Text HOME to 583443 from anywhere in the [place], anytime, about any type of crisis.  A live, trained Crisis Counselor will text you back quickly.  The volunteer Crisis Counselor can help you move from a  hot moment  to a  cool moment.  They can also help you work out a safety plan.

## 2023-10-09 PROBLEM — U07.1 INFECTION DUE TO 2019 NOVEL CORONAVIRUS: Status: ACTIVE | Noted: 2023-01-01

## 2023-10-09 PROBLEM — J96.01 ACUTE RESPIRATORY FAILURE WITH HYPOXIA (H): Status: ACTIVE | Noted: 2023-01-01

## 2023-10-09 PROBLEM — U07.1 COVID-19 VIRUS INFECTION: Status: ACTIVE | Noted: 2023-01-01

## 2023-10-09 NOTE — PLAN OF CARE
Goal Outcome Evaluation:      Plan of Care Reviewed With: patient, family    Overall Patient Progress: improvingOverall Patient Progress: improvingShift:   VS: [id number]  Pain:  Denies pain  Neuro: Remains alert  and oriented to self, place and family  Cardiac: wnl, denies sob and cp  Respiratory: Pt is not a home O2 user but has been needing high flow Nasal [place], FiO2 100%. Pls see flow sheet  Diet/Appetite: Pt is npo. Speech Language path to see Pt tomorrow. Bedside dysphagia completed. Able to suck on ice chips and swallow without difficulty. Tolerated few spoons of Apple Sauce. No coughing noted. Took Oral Tylenol solution without difficulty.  GI/: voided x 1, No BM  LDA's: Right PIV sl.  Skin: No skin issues noted  Activity:  2 assist from bed to bedside commode  Tests/Procedures: AM lab  Pertinent Labs/Lab Collection:   Lab called that Pt is COVID Positive. Provider in room and updated oh Pt result.  Family here since admission and supportive     Plan:

## 2023-10-09 NOTE — ED TRIAGE NOTES
Coming from home arrived EMS, positive COVID test last 10 days, increase SOB, wet lung sounds, general fatigue. At scene sat 75% put pt on 6 L O2 NC sat up to 94%. Goal palliative care per family report. Denies chest pain.     POA phone # [phone]

## 2023-10-09 NOTE — TELEPHONE ENCOUNTER
Please schedule a video visit today at 1 PM to address.  Hospice may be completely appropriate but there are some logistical issues in getting this set up.  First a face-to-face visit is needed.  It also may take several days to find a hospice company and for them to get out of the home.  ER might be most appropriate to expedite the evaluation if she appears uncomfortable and they can still proceed with hospice plan.

## 2023-10-09 NOTE — ED TRIAGE NOTES
Triage Assessment       Row Name 10/09/23 1400       Triage Assessment (Adult)    Airway WDL WDL       Respiratory WDL    Respiratory WDL X;all    Rhythm/Pattern, Respiratory shortness of breath  O2 sat 95% on 15 liters of NC       Peripheral/Neurovascular WDL    Peripheral Neurovascular WDL WDL       Cognitive/Neuro/Behavioral WDL    Cognitive/Neuro/Behavioral WDL X  advanced dementia

## 2023-10-09 NOTE — H&P
[facility]    History and Physical - Hospitalist Service, GOLD TEAM        Date of Admission:  10/9/2023    Assessment & Plan      Karen Arthur is a 88 year old female admitted on 10/9/2023 for acute hypoxic respiratory failure secondary to COVID-19 infection.    # Confirmed COVID-19 infection    # Acute Hypoxic Respiratory Failure secondary to COVID-19 infection     Symptom Onset 10/6/23   Date of 1st Positive Test 10/6/23   Vaccination Status Fully Vaccinated,has not gotten booster this fall       - COVID-19 special precautions, continuous pulse-ox  - Oxygen: continue current support with HFNC at 25 L/min, 100%; titrate to keep SpO2 between 90-96%  - Labs: Standard COVID admission labs ordered (CBC with diff, CMP, INR, D-dimer, CRP).   - Imaging: no additional imaging needed at this time  - Breathing treatments: albuterol inhaler q4 prn, avoid nebulizers in favor of MDIs   - IV fluids: continuous at a rate of 100 mL/hr; hydrate cautiously to avoid worsening respiratory status with volume overload.  - Antibiotics: indicated due to concern of bacterial superinfection; unasyn ordered   - COVID-Focused Medications: Dexamethasone 6 mg x 10 days or until hospital discharge, started on 10/9, Remdesivir x 5 days or until hospital discharge, started on 10/9, and Baracitinib x 14 days or until hospital discharge, started on 10/9. Discussed baractinib with pharmacy  who confirmed that she met inclusion criteria. Cr close to baseline of 1.4 so unclear if mild AMANDO. Counseled family on risks and benefits and provided EUA fact sheet. The patient was in agreement with starting the medication, as was her son and daughter at bedside. Her son is one of  her alternate decision makers.  Baractinib dose reduced to 2mg based on renal function and discussion with pharmacy.  - DVT Prophylaxis:         - At high risk of thrombotic complications due to COVID-19 (DDimer = 1.80 ug/mL FEU (Ref  range: 0.00 - 0.50 ug/mL FEU) )         - PROPHYLACTIC dosing: heparin 5000 units every 8 hours    # Aspiration pneumonia  RLL consolidation on CXR. Per family, had several coughing/choking events while eating and swallowing pills the past few days.   - Continue unsasyn  - Swallow study  - Plan for nurse to do bedside swallow screen tonight     # Likely acute kidney injury on CKD   Her most recently outpatient Cr was 1.47 in , with range being 1.2-1.4 over the past 3 years.. Cr 1.54 on admission. Suspect pre-renal in the setting of poor oral intake.   - MIVF 100ml/hr tonight     # Hypothyroidism  - Continue pta levothyroxine    # Scolioisis  # Chronic back pain  - Tylenol 650mg q6 (ordered liquid per family request)    # HTN  # CAD  # HLD  #   # Nonischemic cardiomyopathy   - Continue pta carvedilol   - Hold pta aspirin, losartan, statin (Normotensive, limit pill burden)  - Monitor BP    # Alzheimer's dementia  - Hold pta memantadine (limit pill burden)   - Delirium precautions   - PT consult     # Goals of care  Patient is DNR/DNI. Family had discussed with PCP that comfort care/hospice may be the best approach given her significant worsening respiratory symptoms. Hospice consulted in ED. Patient improved significantly on HFNC. At this point, they would like to try any COVID directed treatments and supportive care. Confirmed that she is DNR/DNI, no pressors, and would not want to go to ICU. Her kids are not sure if she would tolerated Bipap if needed, but they would be willing to discuss that at the time as a family if needed.     Diet: NPO for Medical/Clinical Reasons Except for: Meds, Ice Chips    DVT Prophylaxis: Heparin SQ  Johnston Catheter: Not present  Lines: None     Cardiac Monitoring: None  Code Status:  DNR/DNI    Clinically Significant Risk Factors Present on Admission              # Hypoalbuminemia: Lowest albumin = 3.3 g/dL at 10/9/2023  2:08 PM, will monitor as appropriate   # Drug Induced  Platelet Defect: home medication list includes an antiplatelet medication   # Hypertension: Noted on problem list   # Non-Invasive mechanical ventilation: current O2 Device: High Flow Nasal Cannula (HFNC)  # Acute hypoxic respiratory failure: continue supplemental O2 as needed  # Dementia: noted on problem list               Disposition Plan      Expected Discharge Date: 10/11/2023                  Carina Adams MD  Hospitalist Service, [facility]  Securely message with "[employer] (more info)  Text page via [facility] Paging/Directory   See signed in provider for up to date coverage information    ______________________________________________________________________    Chief Complaint   COVID+, respiratory difficulty     History is obtained from the patient's family and chart    History of Present Illness   Karen Arthur is a 88 year old female with a history of dementia, scoliosis and hypothyroidism who presents with cough, shortness of breath and weakness. She was diagnosed with COVID on home test on 10/6/23. She had a virtual visit on 10/6 and was started on molnupiravir. She has continued to cough and feel short of breath. She has become weaker and is struggling to stand/walk. She typically ambulates with a walker. She also is having difficulty taking her oral medications.     Patient has dementia. She lives at home with her  and gets significant help from her children. At baseline, she walks with a cane or walker. She is able to remember her family members, but often has trouble with new people or remembering details.     Her son and daughter were at bedside. They initially were worried that patient would need hospice, but she has improved over the past few hours in the ED. They are interested in any treatments that may improve her symptoms and get her home. They are open to Bipap if she needs, but family in agreement that she would not want  intubation, CPR, pressors, or going to the ICU.       Past Medical History    Past Medical History:   Diagnosis Date    Acute CVA (cerebrovascular accident) (H) 02/14/2020    Hospitalized with acute CVA has complication of coronary angiogram with MRI showing acute/subacute infarcts    Acute respiratory failure with hypoxia (H) 10/09/2023    Appetite loss 05/05/2017    ASCVD (arteriosclerotic cardiovascular disease)     Coronary angiogram with moderate disease.  Left bundle branch block possibly responsible for decreased left ventricular systolic function versus frequent PVCs    Benign microscopic hematuria     neg workup    Chest pain 11/01/2016    Hospitalization with chest pain, normal pharmacologic nuclear stress test, GERD suspected    Chronic anemia 01/04/2016    Chronic back pain     Chronic constipation     Chronic fatigue 05/05/2017    Chronic insomnia 02/27/2020    Chronic right shoulder pain 04/04/2017    Dementia (H) 07/28/2016    SLUMS 20 out of 30 in July 2016 started Namenda earlier this year    Diverticulosis     Eczema of scalp     Gallstones     Gastritis 01/01/2015    NSAIDs    GERD (gastroesophageal reflux disease) 11/08/2016    Responsible for recurrent chest pain     Hip fracture (H) 01/01/2015    ORIF hip fracture 2015    History of CVA (cerebrovascular accident) 05/04/2020    Hospitalized with acute CVA has complication of coronary angiogram with MRI showing acute/subacute infarcts    History of skin cancer 12/30/2019    HTN (hypertension)     Hyperlipidemia     Hypothyroidism     Infection due to 2019 novel coronavirus 10/09/2023    Low back pain radiating to both legs 07/18/2019    MRI with severe central canal stenosis L3-L4    Lumbar radiculopathy     SHERIF 2014 L1-L2    Lumbar spinal stenosis     Low back pain with bilateral leg pain with MRI showing severe spinal stenosis L3-L4 and bilateral foraminal stenosis.  SHERIF August 2019 and repeated July 2020    Mass in rectum 04/04/2023     Moderate protein-calorie malnutrition (H24) 11/27/2017    Nausea and vomiting 11/27/2017    Recurrent, occurring monthly, hospitalization November 2017, GI evaluation including EGD suggesting esophageal dysmotility but no other abnormalities.  Questioning whether secondary to chronic constipation and now using MiraLAX.  Also holding Exelon patch, mildly elevated lipase of unclear significance March 2018    Near syncope 10/03/2016    Abnormal pharmacologic nuclear stress test with small area of infarction involving anterior wall with hypokinesis and decreased left ventricular systolic function.  Unclear if artifactual related to LBBB or recent MI    Nonischemic cardiomyopathy (H)     Echocardiogram November 2016 with ejection fraction 35% with global moderate reduction in left ventricular systolic function, aortic sclerosis without significant aortic stenosis, however, cardiac MRI with ejection fraction 45% December 2016 which is more consistent with findings on nuclear stress test, follow-up echocardiogram July 2017 with improved ejection fraction of 50%.      NSVT (nonsustained ventricular tachycardia) (H) 11/08/2016    Event monitor 2016    Occipital neuritis 01/01/2011    Osteoarthritis of both hands     Osteoarthritis, hip, bilateral     Osteoporosis     started Prolia 7/20/15 after hip fx Tscore -1.9, fourth injection given April 4, 2017, follow-up DEXA August 2017 T score -1.6 involving left hip suggesting improvement, -2.4 distal radius.  Completed Prolia February 2020.  DEXA March 2022 -1.7 left femoral neck stable but significant worsening radius -3.1    Pulmonary nodules 01/05/2017    5 mm nodules bilateral apices, indeterminate but follow-up scan July 2017 unchanged.  Appeared to be benign and no further imaging required    Right foot pain 02/27/2020    Sore developing on lateral aspect of foot.  Now with wider shoes.  Podiatry referral if not improving    RLS (restless legs syndrome)     Scoliosis of  lumbar spine, unspecified scoliosis type 12/21/2018    Skin lesion of right ear 12/21/2018    Vitamin D deficiency        Past Surgical History   Past Surgical History:   Procedure Laterality Date    CATARACT EXTRACTION, BILATERAL      CHOLECYSTECTOMY  2007    COLONOSCOPY  2014    CV CORONARY ANGIOGRAM N/A 2/13/2020    Procedure: Coronary Angiogram;  Surgeon: Yan Hickman MD;  Location: [facility] Cath Lab;  Service: Cardiology    CV LEFT HEART CATHETERIZATION WITHOUT LEFT VENTRICULOGRAM Left 2/13/2020    Procedure: Left Heart Catheterization Without Left Ventriculogram;  Surgeon: Yan Hickman MD;  Location: [facility] Cath Lab;  Service: Cardiology    DILATION AND CURETTAGE      HEMORRHOIDECTOMY EXTERNAL      LUMBAR LAMINECTOMY  2005    L3 - L4    LUMPECTOMY BREAST      ORIF HIP FRACTURE  2015    TONSILLECTOMY         Prior to Admission Medications   Prior to Admission Medications   Prescriptions Last Dose Informant Patient Reported? Taking?   LANsoprazole (PREVACID) 30 MG DR capsule   No No   Sig: Take 1 capsule (30 mg total) by mouth daily.   Patient not taking: Reported on 10/9/2023   acetaminophen (TYLENOL) 500 MG tablet   Yes No   Sig: [ACETAMINOPHEN (TYLENOL) 500 MG TABLET] Take 1,000 mg by mouth 3 (three) times a day.   Patient not taking: Reported on 10/9/2023   aspirin 325 MG EC tablet   Yes No   Sig: [ASPIRIN 325 MG EC TABLET] Take 325 mg by mouth daily.   Patient not taking: Reported on 10/9/2023   carvedilol (COREG) 12.5 MG tablet   No No   Sig: Take 1 tablet (12.5 mg total) by mouth 2 (two) times a day.   Patient not taking: Reported on 10/9/2023   cholecalciferol, vitamin D3, 50 mcg (2,000 unit) Tab   No No   Sig: [CHOLECALCIFEROL, VITAMIN D3, 50 MCG (2,000 UNIT) TAB] 1 daily   Patient not taking: Reported on 10/9/2023   levothyroxine (SYNTHROID/LEVOTHROID) 88 MCG tablet   No No   Sig: Take 1 tablet (88 mcg) by mouth daily   Patient not taking: Reported on 10/9/2023   losartan (COZAAR)  50 MG tablet   No No   Sig: Take 1 tablet (50 mg total) by mouth daily.   Patient not taking: Reported on 10/9/2023   melatonin 3 mg cap   No No   Sig: [MELATONIN 3 MG CAP] Take 3 mg by mouth at bedtime.   Patient not taking: Reported on 10/9/2023   memantine (NAMENDA) 10 MG tablet   No No   Sig: Take 1 tablet (10 mg) by mouth 2 times daily   Patient not taking: Reported on 10/9/2023   molnupiravir (LAGEVRIO) 200 MG capsule   No No   Sig: Take 4 capsules (800 mg) by mouth every 12 hours for 5 days   Patient not taking: Reported on 10/9/2023   nabumetone (RELAFEN) 750 MG tablet   No No   Sig: Take 1 tablet (750 mg total) by mouth 2 (two) times a day.   Patient not taking: Reported on 10/9/2023   polyethylene glycol (MIRALAX) 17 gram packet   Yes No   Sig: [POLYETHYLENE GLYCOL (MIRALAX) 17 GRAM PACKET] Take 17 g by mouth daily as needed.    Patient not taking: Reported on 10/9/2023   rosuvastatin (CRESTOR) 20 MG tablet   No No   Sig: Take 1 tablet (20 mg total) by mouth at bedtime.   Patient not taking: Reported on 10/9/2023      Facility-Administered Medications: None      Lives at home with  who is primary caregiver. Very supportive children who live near by.    Physical Exam   Vital Signs: Temp: 97.6  F (36.4  C) Temp src: Oral BP: 128/80 Pulse: 97   Resp: 20 SpO2: 100 % O2 Device: High Flow Nasal Cannula (HFNC) Oxygen Delivery: 25 LPM  Weight: 0 lbs 0 oz    General Appearance: Alert, pleasant, appears comfortable on HFNC.  Respiratory: Course breath sounds bilaterally with crackles noted at RLL. On HFNC 25 LPM.   Cardiovascular: RRR. Normal S1/S2. No murmurs.   GI: Soft, non-tender, non-distended. Normoactive bowel sounds.   Skin: No rash or lesions.   Other: No peripheral edema. Alert and oriented to self and hospital.     Medical Decision Making       80 MINUTES SPENT BY ME on the date of service doing chart review, history, exam, documentation & further activities per the note.      Data     I have  personally reviewed the following data over the past 24 hrs:    3.0 (L)  \   11.3 (L)   / 167     133 (L) 100 36.5 (H) /  95   4.8 17 (L) 1.54 (H) \     ALT: 28 AST: 57 (H) AP: 90 TBILI: 0.3   ALB: 3.3 (L) TOT PROTEIN: 6.4 LIPASE: N/A     Trop: 37 (H) BNP: 4,800 (H)     Procal: 6.29 (HH) CRP: 181.00 (H) Lactic Acid: 1.7       INR:  N/A PTT:  N/A   D-dimer:  1.80 (H) Fibrinogen:  N/A     Ferritin:  N/A % Retic:  N/A LDH:  268 (H)

## 2023-10-09 NOTE — TELEPHONE ENCOUNTER
Patient tested positive for COVID on 10/6, molnupiravir started 10/7.     Daughter stated today patient has an excess of phlegm that have now contributed to gurgling with breathing and she believes patient can no longer swallow.     I discussed that with these symptoms patient should be seen in the ED. Family is apprehensive to these and would like to know from pcp if hospice is a better option. We discussed what hospice can provide and process of admission. They asked if patient can receive IV fluids, I discussed that they could not but the hospital can and if they would like, they can start with ED visit for evaluation and assistance with acute symptoms and hospice referral can be placed then. They would like to avoid bringing her to ED unless pcp recommends it.     Family's main concern is patient has had a large status decline since COVID onset and is now unable to get out of bed. Family is completing all care including incontinence care.    Family requested I route message to pcp to ask if hospice is appropriate, or patient should be seen in ED first. They are comfortable moving forward with hospice if pcp thinks it is appropriate.

## 2023-10-09 NOTE — PROGRESS NOTES
Karen Roper is a 88 year old who is being evaluated via a billable video visit.      How would you like to obtain your AVS? MyChart  If the video visit is dropped, the invitation should be resent by: please call daughter Aranza [phone]  Will anyone else be joining your video visit? No          Assessment & Plan       Acute respiratory failure with hypoxia (H) with infection due to 2019 novel coronavirus  88-year-old woman with dementia who tested positive for COVID on October 6.  She has been declining over the weekend despite taking molnupiravir.  Paxlovid was not started because of impaired kidney function.  Oxygen saturation in the 60s and having difficulty swallowing.  Video visit today with family at bedside.  She is confused and not interactive but is awake and does not appear to be experiencing any acute respiratory difficulty but family reports that dyspnea and labored breathing is occurring intermittently.  Family is not interested in any invasive procedures including intubation with mechanical ventilation and were contemplating starting a comfort care approach but at this time they are interested in continued treatment up to a point.  I am recommending that they call 911 and transport her to the ER for further evaluation and treatment.  Noninvasive measures including nebulizer treatment, oxygen and possibly CPAP can be offered.  She might be a candidate for remdesivir.  She should be DNR/DNI.  Comfort care/hospice is appropriate if not responding to the above conservative treatments.                 Kehinde Tate MD  [facility]   Kaern Roper is a 88 year old, presenting for the following health issues: Worsening respiratory condition after diagnosis of COVID-19.  See assessment and plan for details.  Follow Up (Patients health is declining, would like to discuss hospice)      10/9/2023    12:36 PM   Additional Questions   Roomed by          Review  of Systems   See assessment and plan      Objective           Vitals:  No vitals were obtained today due to virtual visit.    Physical Exam   Elderly woman with dementia who is minimally interactive during video visit  She appears anxious and only slightly labored breathing    Video-Visit Details    Type of service:  Video Visit     Originating Location (pt. Location): Home    Distant Location (provider location):  On-site  Platform used for Video Visit: Omar

## 2023-10-09 NOTE — PROGRESS NOTES
[facility]  _________________________________________________________________    [facility] Contact Number: [phone]    - Providers: Please contact [facility] with changes in orders or clinical plan of care   - Nursing: Please contact [facility] with significant changes in patient condition    Hospice will notify the care team (including the hospitalist) to confirm date of inpatient hospice (GIP) admission.    New Epic encounter will not be created until hospice completes admission.   ______________________________________________________________________     We have received the referral We will reach out to the hospital to let them know of plan as well as family.  Our Hospice Care Coordinator Hui Boyd will be there to see the patient and family around 4pm.If anything changes we will let you know.

## 2023-10-09 NOTE — ED PROVIDER NOTES
[place] EMERGENCY DEPARTMENT ([facility])    10/09/23       ED PROVIDER NOTE  ED 18      History     Chief Complaint   Patient presents with    Shortness of Breath     Increase fatigue and decrease sat      HPI  Karen Arthur is a 88 year old female who has a history of dementia, scoliosis and hypothyroidism who presents for dyspnea & weakness.  She was diagnosed with COVID based on a home test on Friday of last week.  She has been on treatment with molnupiravir that was written on 10/6 after a virtual visit.  She comes in today because she seems weaker with more of a coarse cough. Now struggling to take her oral medications and decreased PO intake.  She also is struggling to stand and independently walk safely. She usually walks with a walker. Home pulse oximeter has been difficult to interpret due to poor waveform, but has had some low readings in 70%.     This part of the medical record was transcribed by TIERRA RIVAS, Medical Scribe, from a dictation done by Ya Gómez MD.     Past Medical History  Past Medical History:   Diagnosis Date    Acute CVA (cerebrovascular accident) (H) 02/14/2020    Hospitalized with acute CVA has complication of coronary angiogram with MRI showing acute/subacute infarcts    Acute respiratory failure with hypoxia (H) 10/09/2023    Appetite loss 05/05/2017    ASCVD (arteriosclerotic cardiovascular disease)     Coronary angiogram with moderate disease.  Left bundle branch block possibly responsible for decreased left ventricular systolic function versus frequent PVCs    Benign microscopic hematuria     neg workup    Chest pain 11/01/2016    Hospitalization with chest pain, normal pharmacologic nuclear stress test, GERD suspected    Chronic anemia 01/04/2016    Chronic back pain     Chronic constipation     Chronic fatigue 05/05/2017    Chronic insomnia 02/27/2020    Chronic right shoulder pain 04/04/2017    Dementia (H) 07/28/2016    SLUMS 20 out of 30 in July  2016 started Namenda earlier this year    Diverticulosis     Eczema of scalp     Gallstones     Gastritis 01/01/2015    NSAIDs    GERD (gastroesophageal reflux disease) 11/08/2016    Responsible for recurrent chest pain     Hip fracture (H) 01/01/2015    ORIF hip fracture 2015    History of CVA (cerebrovascular accident) 05/04/2020    Hospitalized with acute CVA has complication of coronary angiogram with MRI showing acute/subacute infarcts    History of skin cancer 12/30/2019    HTN (hypertension)     Hyperlipidemia     Hypothyroidism     Infection due to 2019 novel coronavirus 10/09/2023    Low back pain radiating to both legs 07/18/2019    MRI with severe central canal stenosis L3-L4    Lumbar radiculopathy     SHERIF 2014 L1-L2    Lumbar spinal stenosis     Low back pain with bilateral leg pain with MRI showing severe spinal stenosis L3-L4 and bilateral foraminal stenosis.  SHERIF August 2019 and repeated July 2020    Mass in rectum 04/04/2023    Moderate protein-calorie malnutrition (H24) 11/27/2017    Nausea and vomiting 11/27/2017    Recurrent, occurring monthly, hospitalization November 2017, GI evaluation including EGD suggesting esophageal dysmotility but no other abnormalities.  Questioning whether secondary to chronic constipation and now using MiraLAX.  Also holding Exelon patch, mildly elevated lipase of unclear significance March 2018    Near syncope 10/03/2016    Abnormal pharmacologic nuclear stress test with small area of infarction involving anterior wall with hypokinesis and decreased left ventricular systolic function.  Unclear if artifactual related to LBBB or recent MI    Nonischemic cardiomyopathy (H)     Echocardiogram November 2016 with ejection fraction 35% with global moderate reduction in left ventricular systolic function, aortic sclerosis without significant aortic stenosis, however, cardiac MRI with ejection fraction 45% December 2016 which is more consistent with findings on nuclear stress  test, follow-up echocardiogram July 2017 with improved ejection fraction of 50%.      NSVT (nonsustained ventricular tachycardia) (H) 11/08/2016    Event monitor 2016    Occipital neuritis 01/01/2011    Osteoarthritis of both hands     Osteoarthritis, hip, bilateral     Osteoporosis     started Prolia 7/20/15 after hip fx Tscore -1.9, fourth injection given April 4, 2017, follow-up DEXA August 2017 T score -1.6 involving left hip suggesting improvement, -2.4 distal radius.  Completed Prolia February 2020.  DEXA March 2022 -1.7 left femoral neck stable but significant worsening radius -3.1    Pulmonary nodules 01/05/2017    5 mm nodules bilateral apices, indeterminate but follow-up scan July 2017 unchanged.  Appeared to be benign and no further imaging required    Right foot pain 02/27/2020    Sore developing on lateral aspect of foot.  Now with wider shoes.  Podiatry referral if not improving    RLS (restless legs syndrome)     Scoliosis of lumbar spine, unspecified scoliosis type 12/21/2018    Skin lesion of right ear 12/21/2018    Vitamin D deficiency      Past Surgical History:   Procedure Laterality Date    CATARACT EXTRACTION, BILATERAL      CHOLECYSTECTOMY  2007    COLONOSCOPY  2014    CV CORONARY ANGIOGRAM N/A 2/13/2020    Procedure: Coronary Angiogram;  Surgeon: Yan Hickman MD;  Location: [facility];  Service: Cardiology    CV LEFT HEART CATHETERIZATION WITHOUT LEFT VENTRICULOGRAM Left 2/13/2020    Procedure: Left Heart Catheterization Without Left Ventriculogram;  Surgeon: Yan Hickman MD;  Location: [facility];  Service: Cardiology    DILATION AND CURETTAGE      HEMORRHOIDECTOMY EXTERNAL      LUMBAR LAMINECTOMY  2005    L3 - L4    LUMPECTOMY BREAST      ORIF HIP FRACTURE  2015    TONSILLECTOMY       acetaminophen (TYLENOL) 500 MG tablet  aspirin 325 MG EC tablet  carvedilol (COREG) 12.5 MG tablet  cholecalciferol, vitamin D3, 50 mcg (2,000 unit) Tab  LANsoprazole (PREVACID)  30 MG DR capsule  levothyroxine (SYNTHROID/LEVOTHROID) 88 MCG tablet  losartan (COZAAR) 50 MG tablet  melatonin 3 mg cap  memantine (NAMENDA) 10 MG tablet  Multiple Vitamins-Minerals (ICAPS AREDS 2 PO)  nabumetone (RELAFEN) 750 MG tablet  polyethylene glycol (MIRALAX) 17 gram packet  rosuvastatin (CRESTOR) 40 MG tablet      Allergies   Allergen Reactions    Aricept [Donepezil] Unknown     Leg cramps    Exelon [Rivastigmine] Unknown     Nausea/vomiting    Lisinopril Unknown     Hypotension    Statins-Hmg-Coa Reductase Inhibitors [Statins] Muscle Pain (Myalgia)    Sulfa (Sulfonamide Antibiotics) [Sulfa Antibiotics] Rash     Family History  Family History   Problem Relation Age of Onset    Rheumatologic Disease Mother     Coronary Artery Disease Father     Sudden Death Father 71.00    No Known Problems Sister     No Known Problems Brother     No Known Problems Brother      Social History   Social History     Tobacco Use    Smoking status: Never    Smokeless tobacco: Never   Substance Use Topics    Alcohol use: Yes     Alcohol/week: 1.0 standard drink of alcohol     Comment: Alcoholic Drinks/day: glass of wine with dinner    Drug use: No      Past medical history, past surgical history, medications, allergies, family history, and social history were reviewed with the patient. No additional pertinent items.      A complete review of systems was performed with pertinent positives and negatives noted in the HPI, and all other systems negative.    Physical Exam   BP: (!) 105/95  Pulse: 100  Temp: 97.6  F (36.4  C)  Resp: 20  SpO2: (!) 83 %    Physical Exam  Gen: alert, interactive with family, tachypnea and increased work of breathing  HEENT:PERRL, mucous membranes moist  CV:RRR without murmurs  PULM: coarse breath sounds bilaterally with use of accessory muscles of respiration  Abd:soft, nontender, nondistended. Bowel sounds present and normal  UE:No traumatic injuries, skin normal  LE:no traumatic injuries, skin normal,  no LE edema  Skin: no rashes or ecchymoses    ED Course, Procedures, & Data      Procedures       Results for orders placed or performed during the hospital encounter of 10/09/23   XR Chest Port 1 View     Status: None    Narrative    Portable chest    INDICATION: Dyspnea/short of breath    COMPARISON: Archived images 11/26/2017    FINDINGS: Heart size normal. Calcification of the aortic knob.  Increased right lower lobe opacification consistent with  consolidation. This may represent infection. Costophrenic angles are  reasonably sharp bilaterally.      Impression    IMPRESSION: Findings concerning for right lower lung pneumonia.    TONIA DENTON MD         SYSTEM ID:  [id number]   [place] Draw     Status: None    Narrative    The following orders were created for panel order [place] Draw.  Procedure                               Abnormality         Status                     ---------                               -----------         ------                     Extra Blue Top Tube[[id number]]                              Final result               Extra Red Top Tube[[id number]]                               Final result               Extra Green Top (Lithium...[[id number]]                      Final result               Extra Purple Top Tube[[id number]]                            Final result                 Please view results for these tests on the individual orders.   Extra Blue Top Tube     Status: None   Result Value Ref Range    Hold Specimen JIC    Extra Red Top Tube     Status: None   Result Value Ref Range    Hold Specimen JIC    Extra Green Top (Lithium Heparin) Tube     Status: None   Result Value Ref Range    Hold Specimen JIC    Extra Purple Top Tube     Status: None   Result Value Ref Range    Hold Specimen JIC    Comprehensive metabolic panel     Status: Abnormal   Result Value Ref Range    Sodium 133 (L) 135 - 145 mmol/L    Potassium 4.8 3.4 - 5.3 mmol/L    Carbon Dioxide (CO2) 17 (L) 22 - 29 mmol/L     Anion Gap 16 (H) 7 - 15 mmol/L    Urea Nitrogen 36.5 (H) 8.0 - 23.0 mg/dL    Creatinine 1.54 (H) 0.51 - 0.95 mg/dL    GFR Estimate 32 (L) >60 mL/min/1.73m2    Calcium 8.7 (L) 8.8 - 10.2 mg/dL    Chloride 100 98 - 107 mmol/L    Glucose 95 70 - 99 mg/dL    Alkaline Phosphatase 90 35 - 104 U/L    AST 57 (H) 0 - 45 U/L    ALT 28 0 - 50 U/L    Protein Total 6.4 6.4 - 8.3 g/dL    Albumin 3.3 (L) 3.5 - 5.2 g/dL    Bilirubin Total 0.3 <=1.2 mg/dL   Lactic acid whole blood     Status: Normal   Result Value Ref Range    Lactic Acid 1.7 0.7 - 2.0 mmol/L   CRP inflammation     Status: Abnormal   Result Value Ref Range    CRP Inflammation 181.00 (H) <5.00 mg/L   D dimer quantitative     Status: Abnormal   Result Value Ref Range    D-Dimer Quantitative 1.80 (H) 0.00 - 0.50 ug/mL FEU    Narrative    This D-dimer assay is intended for use in conjunction with a clinical pretest probability assessment model to exclude pulmonary embolism (PE) and deep venous thrombosis (DVT) in outpatients suspected of PE or DVT. The cut-off value is 0.50 ug/mL FEU.    For patients 50 years of age or older, the application of age-adjusted cut-off values for D-Dimer may increase the specificity without significant effect on sensitivity. The literature suggested calculation age adjusted cut-off in ug/L = age in years x 10 ug/L. The results in this laboratory are reported as ug/mL rather than ug/L. The calculation for age adjusted cut off in ug/mL= age in years x 0.01 ug/mL. For example, the cut off for a 76 year old male is 76 x 0.01 ug/mL = 0.76 ug/mL (760 ug/L).    M Fredi et al. Age adjusted D-dimer cut-off levels to rule out pulmonary embolism: The ADJUST-PE Study. JIM 2014;311:1758-0588.; HJ Christina et al. Diagnostic accuracy of conventional or age adjusted D-dimer cutoff values in older patients with suspected venous thromboembolism. Systemic review and meta-analysis. BMJ 2013:346:f2492.   Lactate Dehydrogenase     Status: Abnormal   Result  Value Ref Range    Lactate Dehydrogenase 268 (H) 0 - 250 U/L   Magnesium     Status: Normal   Result Value Ref Range    Magnesium 1.7 1.7 - 2.3 mg/dL   Phosphorus     Status: Normal   Result Value Ref Range    Phosphorus 4.3 2.5 - 4.5 mg/dL   Procalcitonin     Status: Abnormal   Result Value Ref Range    Procalcitonin 6.29 (HH) <0.05 ng/mL   Blood gas venous     Status: None   Result Value Ref Range    pH Venous 7.35 7.32 - 7.43    pCO2 Venous 42 40 - 50 mm Hg    pO2 Venous 26 25 - 47 mm Hg    Bicarbonate Venous 23 21 - 28 mmol/L    Base Excess/Deficit -2.6 -7.7 - 1.9 mmol/L    FIO2 100    Troponin T, High Sensitivity     Status: Abnormal   Result Value Ref Range    Troponin T, High Sensitivity 37 (H) <=14 ng/L   Nt probnp inpatient     Status: Abnormal   Result Value Ref Range    N terminal Pro BNP Inpatient 4,800 (H) 0 - 1,800 pg/mL   Symptomatic COVID-19 Virus (Coronavirus) by PCR Nose     Status: Abnormal    Specimen: Nose; Swab   Result Value Ref Range    SARS CoV2 PCR Positive (A) Negative    Narrative    Testing was performed using the Xpert Xpress SARS-CoV-2 Assay on the Cepheid Gene-Xpert Instrument Systems. Additional information about this Emergency Use Authorization (EUA) assay can be found via the Lab Guide. This test should be ordered for the detection of SARS-CoV-2 in individuals who meet SARS-CoV-2 clinical and/or epidemiological criteria as well as from individuals without symptoms or other reasons to suspect COVID-19. Test performance for asymptomatic patients has only been established in anterior nasal swab specimens. This test is for in vitro diagnostic use under the FDA EUA for laboratories certified under CLIA to perform high complexity testing. This test has not been FDA cleared or approved. A negative result does not rule out the presence of PCR inhibitors in the specimen or target RNA concentration below the limit of detection for the assay. The possibility of a false negative should be  considered if the patient's recent exposure or clinical presentation suggests COVID-19. This test was validated by [facility] [employer]. These Laboratories are certified under the Clinical Laboratory Improvement Amendments (CLIA) as qualified to perform high complexity testing.     CBC with platelets and differential     Status: Abnormal   Result Value Ref Range    WBC Count 3.0 (L) 4.0 - 11.0 10e3/uL    RBC Count 3.55 (L) 3.80 - 5.20 10e6/uL    Hemoglobin 11.3 (L) 11.7 - 15.7 g/dL    Hematocrit 34.1 (L) 35.0 - 47.0 %    MCV 96 78 - 100 fL    MCH 31.8 26.5 - 33.0 pg    MCHC 33.1 31.5 - 36.5 g/dL    RDW 13.3 10.0 - 15.0 %    Platelet Count 167 150 - 450 10e3/uL   Manual Differential     Status: Abnormal   Result Value Ref Range    % Neutrophils 70 %    % Lymphocytes 15 %    % Monocytes 4 %    % Eosinophils 0 %    % Basophils 0 %    % Metamyelocytes 7 %    % Myelocytes 4 %    Absolute Neutrophils 2.1 1.6 - 8.3 10e3/uL    Absolute Lymphocytes 0.5 (L) 0.8 - 5.3 10e3/uL    Absolute Monocytes 0.1 0.0 - 1.3 10e3/uL    Absolute Eosinophils 0.0 0.0 - 0.7 10e3/uL    Absolute Basophils 0.0 0.0 - 0.2 10e3/uL    Absolute Metamyelocytes 0.2 (H) <=0.0 10e3/uL    Absolute Myelocytes 0.1 (H) <=0.0 10e3/uL    RBC Morphology Confirmed RBC Indices     Platelet Assessment  Automated Count Confirmed. Platelet morphology is normal.     Automated Count Confirmed. Platelet morphology is normal.    [place] Cells Slight (A) None Seen   Extra Tube ([place] Draw)     Status: None    Narrative    The following orders were created for panel order Extra Tube ([place] Draw).  Procedure                               Abnormality         Status                     ---------                               -----------         ------                     Extra Green Top (Lithium...[[id number]]                      Final result                 Please view results for these tests on the individual orders.   Extra Green Top (Lithium Heparin) Tube      Status: None   Result Value Ref Range    Hold Specimen [facility]    CBC with platelets differential     Status: Abnormal    Narrative    The following orders were created for panel order CBC with platelets differential.  Procedure                               Abnormality         Status                     ---------                               -----------         ------                     CBC with platelets and d...[[id number]]  Abnormal            Final result               Manual Differential[[id number]]          Abnormal            Final result                 Please view results for these tests on the individual orders.     Medications   No lozenges or gum should be given while patient on BIPAP/AVAPS/AVAPS AE (has no administration in time range)   carboxymethylcellulose PF (REFRESH PLUS) 0.5 % ophthalmic solution 1 drop (has no administration in time range)   Patient may continue current oral medications (has no administration in time range)   sodium chloride (PF) 0.9% PF flush 3 mL (has no administration in time range)   sodium chloride (PF) 0.9% PF flush 3 mL (3 mLs Intracatheter $Given 10/9/23 1435)   dexAMETHasone PF (DECADRON) injection 6 mg (6 mg Intravenous $Given 10/9/23 1444)   aerochamber with mouthpiece (NO mask) - > 5 years 1 each (has no administration in time range)   pharmacy alert - intermittent dosing (has no administration in time range)   remdesivir 100 mg in sodium chloride 0.9 % 250 mL intermittent infusion (has no administration in time range)     And   sodium chloride 0.9% BOLUS 50 mL (has no administration in time range)   memantine (NAMENDA) tablet 10 mg ( Oral Automatically Held 10/12/23 2000)   nabumetone (RELAFEN) tablet 750 mg ( Oral Automatically Held 10/12/23 0800)   levothyroxine (SYNTHROID/LEVOTHROID) tablet 88 mcg ( Oral Automatically Held 10/12/23 0800)   melatonin tablet 1 mg (has no administration in time range)   heparin ANTICOAGULANT injection 5,000 Units (has no administration  in time range)   ondansetron (ZOFRAN ODT) ODT tab 4 mg (has no administration in time range)     Or   ondansetron (ZOFRAN) injection 4 mg (has no administration in time range)   Medication instructions: Do NOT use nebulized medications (has no administration in time range)   lactated ringers infusion (has no administration in time range)   ampicillin-sulbactam (UNASYN) 3 g vial to attach to  mL bag (has no administration in time range)   Medication instructions: Do NOT use nebulized medications (has no administration in time range)   baricitinib (OLUMIANT) tablet 2 mg (has no administration in time range)   acetaminophen (TYLENOL) solution 650 mg (650 mg Oral $Given 10/9/23 1842)   carvedilol (COREG) tablet 12.5 mg (has no administration in time range)   albuterol (PROVENTIL HFA/VENTOLIN HFA) inhaler (6 puffs Inhalation $Given 10/9/23 1444)   ampicillin-sulbactam (UNASYN) 3 g vial to attach to  mL bag (0 g Intravenous Stopped 10/9/23 1814)   remdesivir 200 mg in sodium chloride 0.9 % 250 mL intermittent infusion (200 mg Intravenous $New Bag 10/9/23 1832)     Followed by   sodium chloride 0.9% BOLUS 50 mL (50 mLs Intravenous $New Bag 10/9/23 1831)     Labs Ordered and Resulted from Time of ED Arrival to Time of ED Departure   COMPREHENSIVE METABOLIC PANEL - Abnormal       Result Value    Sodium 133 (*)     Potassium 4.8      Carbon Dioxide (CO2) 17 (*)     Anion Gap 16 (*)     Urea Nitrogen 36.5 (*)     Creatinine 1.54 (*)     GFR Estimate 32 (*)     Calcium 8.7 (*)     Chloride 100      Glucose 95      Alkaline Phosphatase 90      AST 57 (*)     ALT 28      Protein Total 6.4      Albumin 3.3 (*)     Bilirubin Total 0.3     CRP INFLAMMATION - Abnormal    CRP Inflammation 181.00 (*)    D DIMER QUANTITATIVE - Abnormal    D-Dimer Quantitative 1.80 (*)    LACTATE DEHYDROGENASE - Abnormal    Lactate Dehydrogenase 268 (*)    PROCALCITONIN - Abnormal    Procalcitonin 6.29 (*)    TROPONIN T, HIGH SENSITIVITY -  Abnormal    Troponin T, High Sensitivity 37 (*)    NT PROBNP INPATIENT - Abnormal    N terminal Pro BNP Inpatient 4,800 (*)    COVID-19 VIRUS (CORONAVIRUS) BY PCR - Abnormal    SARS CoV2 PCR Positive (*)    CBC WITH PLATELETS AND DIFFERENTIAL - Abnormal    WBC Count 3.0 (*)     RBC Count 3.55 (*)     Hemoglobin 11.3 (*)     Hematocrit 34.1 (*)     MCV 96      MCH 31.8      MCHC 33.1      RDW 13.3      Platelet Count 167     DIFFERENTIAL - Abnormal    % Neutrophils 70      % Lymphocytes 15      % Monocytes 4      % Eosinophils 0      % Basophils 0      % Metamyelocytes 7      % Myelocytes 4      Absolute Neutrophils 2.1      Absolute Lymphocytes 0.5 (*)     Absolute Monocytes 0.1      Absolute Eosinophils 0.0      Absolute Basophils 0.0      Absolute Metamyelocytes 0.2 (*)     Absolute Myelocytes 0.1 (*)     RBC Morphology Confirmed RBC Indices      Platelet Assessment        Value: Automated Count Confirmed. Platelet morphology is normal.    [place] Cells Slight (*)    LACTIC ACID WHOLE BLOOD - Normal    Lactic Acid 1.7     MAGNESIUM - Normal    Magnesium 1.7     PHOSPHORUS - Normal    Phosphorus 4.3     BLOOD GAS VENOUS    pH Venous 7.35      pCO2 Venous 42      pO2 Venous 26      Bicarbonate Venous 23      Base Excess/Deficit -2.6      FIO2 100     BLOOD CULTURE   BLOOD CULTURE     XR Chest Port 1 View   Final Result   IMPRESSION: Findings concerning for right lower lung pneumonia.      TONIA DENTON MD            SYSTEM ID:  [id number]             Critical care was performed.   Critical Care Addendum  My initial assessment, based on my review of nursing observations, review of vital signs, focused history, physical exam, and review of cardiac rhythm monitor, established a high suspicion that Karen Arthur has respiratory distress, which requires immediate intervention, and therefore she is critically ill.     After the initial assessment, the care team initiated multiple lab tests, initiated medication  therapy with IV dexamethasone and remdesivir, initiated intensive non-invasive respiratory support, and consulted with inpatient hospice  to provide stabilization care. Due to the critical nature of this patient, I reassessed vital signs, physical exam, and respiratory status multiple times prior to her disposition.     Time also spent performing documentation, discussion with family to obtain medical information for decision making, reviewing test results, and coordination of care.     Critical care time (excluding teaching time and procedures): 75 minutes.     Assessment & Plan    89 yo F with a hx of dementia, prior stroke, presenting with dyspnea, weakness. Known COVID infection with symptoms for ~5 days.   Arrives afebrile, hypoxic on 15 LMP venti mask.   RT contacted and pt placed on HFNC at 25 LPM with improvement in O2 saturation.  CXR independently interpreted by me and shows a RLQ infiltrate, raising concern for aspiration vs. CAP.   COVID confirmed +.  Treated with IV dexmethasone, remdesivir for COVID, and unasyn for aspiration/CAP.  Reviewed goals of care and pt is DNR/DNI, but accepting of other interventions. They have interest in discussing hospice if this illness appears to be life limiting. Inpatient hospice consult placed.   Discussed with  triage hospitalist and planning for admission to [facility].       I have reviewed the nursing notes. I have reviewed the findings, diagnosis, plan and need for follow up with the patient.    New Prescriptions    No medications on file       Final diagnoses:   COVID-19 virus infection   Aspiration pneumonia of right lower lobe, unspecified aspiration pneumonia type (H)   Acute respiratory failure with hypoxia (H)       Ya Gómez MD  [facility] EMERGENCY DEPARTMENT  10/9/2023     Ya Gómez MD  10/09/23 1914

## 2023-10-10 NOTE — PROGRESS NOTES
"   10/10/23 1100   Appointment Info   Signing Clinician's Name / Credentials (SLP) Julee Johnson MA CCC-SLP   General Information   Onset of Illness/Injury or Date of Surgery 10/09/23   Referring Physician Carina Adams MD   Pertinent History of Current Problem Per MD, \"pt is a 88 year old female who has a history of dementia, scoliosis and hypothyroidism who presents for dyspnea & weakness.  She was diagnosed with COVID based on a home test on Friday of last week.  She has been on treatment with molnupiravir that was written on 10/6 after a virtual visit.  She comes in today because she seems weaker with more of a coarse cough. Now struggling to take her oral medications and decreased PO intake.  She also is struggling to stand and independently walk safely. She usually walks with a walker. Home pulse oximeter has been difficult to interpret due to poor waveform, but has had some low readings in 70%.\"  Swallow eval completed per MD orders.   General Observations Alert, pleasant, confused   Pain Assessment   Patient Currently in Pain No   Type of Evaluation   Type of Evaluation Swallow Evaluation   Oral Motor   Oral Musculature generally intact   Structural Abnormalities none present   Mucosal Quality good   Dentition (Oral Motor)   Dentition (Oral Motor) natural dentition   Facial Symmetry (Oral Motor)   Facial Symmetry (Oral Motor) WNL   Lip Function (Oral Motor)   Lip Range of Motion (Oral Motor) WNL   Tongue Function (Oral Motor)   Tongue Coordination/Speed (Oral Motor) WNL   Tongue ROM (Oral Motor) WNL   Jaw Function (Oral Motor)   Jaw Function (Oral Motor) WNL   Cough/Swallow/Gag Reflex (Oral Motor)   Soft Palate/Velum (Oral Motor) WNL   Volitional Swallow (Oral Motor) WNL   Vocal Quality/Secretion Management (Oral Motor)   Vocal Quality (Oral Motor) WNL   Secretion Management (Oral Motor) WNL   General Swallowing Observations   Past History of Dysphagia No   Respiratory Support (General Swallowing " Observations) nasal cannula   Current Diet/Method of Nutritional Intake (General Swallowing Observations, NIS) NPO   Swallowing Evaluation Clinical swallow evaluation   Clinical Swallow Evaluation   Feeding Assistance minimal assistance required   Clinical Swallow Evaluation Textures Trialed thin liquids;pureed;solid foods   Clinical Swallow Eval: Thin Liquid Texture Trial   Mode of Presentation, Thin Liquids self-fed;spoon;cup   Volume of Liquid or Food Presented 3 oz   Oral Phase of Swallow WFL   Pharyngeal Phase of Swallow intact   Diagnostic Statement No s/sx of aspiration   Clinical Swallow Evaluation: Puree Solid Texture Trial   Mode of Presentation, Puree fed by clinician;spoon   Volume of Puree Presented 2 oz   Oral Phase, Puree WFL   Pharyngeal Phase, Puree intact   Diagnostic Statement No s/sx of aspiration   Clinical Swallow Evaluation: Solid Food Texture Trial   Mode of Presentation self-fed   Volume Presented 1 andrea cracker   Oral Phase WFL   Pharyngeal Phase intact   Diagnostic Statement No s/sx of aspiration   Esophageal Phase of Swallow   Patient reports or presents with symptoms of esophageal dysphagia No   Swallowing Recommendations   Diet Consistency Recommendations regular diet;thin liquids (level 0)   Supervision Level for Intake close supervision needed   Mode of Delivery Recommendations bolus size, small   Swallowing Maneuver Recommendations alternate food and liquid intake   Recommended Feeding/Eating Techniques (Swallow Eval) patient is independent, no specific recommendations   Medication Administration Recommendations, Swallowing (SLP) Orally   Instrumental Assessment Recommendations instrumental evaluation not recommended at this time   Clinical Impression   Criteria for Skilled Therapeutic Interventions Met (SLP Eval) Evaluation only;No problems identified which require skilled intervention   SLP Diagnosis Pt presents with WNL oropharyngeal swallowing abilities   Risks & Benefits of  therapy have been explained evaluation/treatment results reviewed;care plan/treatment goals reviewed;risks/benefits reviewed;current/potential barriers reviewed;participants voiced agreement with care plan;participants included;patient;son   Clinical Impression Comments Swallow evaluation completed this AM per MD orders. Pt presents with WNL swallowing abilities and oral mech appears WNL.  Pt assessed with thin liquids via cup and straw, pureed foods and andrea cracker.  Pt presents with functional mastication and no overt s/sx of aspiration observed across consistencies.  Pt's family report pt no longer exhibits swallowing difficulties since being on oxygen, decreased fatigue and decreased confusion.  Recommend regular food textures (IDDSI 7) and thin liquids (IDDSI 0). Recommend pills orally. No further speech therapy warranted at this time and will s-o.   SLP Goals   Therapy Frequency (SLP Eval) one time eval and treatment only   Interventions   Interventions Quick Adds Swallowing Dysfunction   Swallowing Dysfunction &/or Oral Function for Feeding   Treatment Detail/Skilled Intervention Swallowing eval completed   SLP Discharge Planning   SLP Plan Eval only; s-o   SLP Discharge Recommendation home with assist   SLP Rationale for DC Rec Pt's oropharyngeal swallowing abilities appear WNL.   SLP Brief overview of current status  Recommend regular food textures (IDDSI 7) and thin liquids (IDDSI 0). Recommend pills orally. No further speech therapy warranted at this time and will s-o.

## 2023-10-10 NOTE — PLAN OF CARE
Goal Outcome Evaluation:      Plan of Care Reviewed With: patient, family    Overall Patient Progress: improvingOverall Patient Progress: improving    Pt weaned to room air since noon today.  Satting 92-97%.  Pt has a loose productive cough that she is unable to get to the front of the mouth and benefits from a oral suction catheter.  Lungs crackles to course.  Pt not eating yet.  Taking some sips.  Pt up to commode with assist of 1-2.  Pt sat in chair for 3 hours.  Family at bedside, both RN's here at the [place].  Family would like patient to stay overnight and discharge home tomorrow.  Pt has lots of family help for home.  Will continue to encourage po and anticipate discharge home tomorrow if patient continues to improve.

## 2023-10-10 NOTE — PROGRESS NOTES
[facility]    Medicine Progress Note - Hospitalist Service  Date of Admission: 10/9/2023    Assessment & Plan   Karen Arthur is a 88 year old female admitted on 10/9/2023 for acute hypoxic respiratory failure secondary to COVID-19 infection.     COVID-19 infection, likely aspiration PNA: New symptoms 10/6. Presented to the ED 10/9 with new hypoxia. CXR 10/9 increased RLL opacity c/w consolidation. Treated with Unasyn 10/9 to 10/10, dexamethasone, Remdesivir, baricitinib, albuterol, pulmonary toilet.   - Narrow to Augmentin to treat for 7 days  - SLP consulted  - PT, OT following  - Ok to stop tele  - Continue Dexamethasone, Remdesivir, Baricitnib while inpatient     Acute Hypoxic Respiratory Failure secondary to COVID-19 infection, nearly resolved: Admitted with new hypoxia. Workup as above. Weaned to room air with stable O2 sats with above treatment and pulmonary toilet. Intermittent O2 dips while asleep  - Will keep patient admitted overnight for ongoing monitoring. May need walk test if requiring intermittent O2 over the next 12-24 hours  - Further care as above     CKD III with mild Cr rise: BL Cr around 1.5 over the past year. Cr 1.6   - BMP 10/11     Goals of care: Patient is DNR/DNI. Hospice consult considered but will plan for continued close OP follow up with PCP to discuss ongoing goals of care     Hypothyroidism: TSH 5.5. Continue synthroid. Trend TFTs     Scoliosis, chronic back pain: Continue supportive cares     HTN, CAD,HLD, , Nonischemic cardiomyopathy: No acute concerns. Continue PTA meds     Alzheimer's dementia: Resume PTA memantadine       Diet: Diet  Regular Diet Adult    DVT Prophylaxis: Heparin SQ  [place] Catheter: Not present  Lines: None     Cardiac Monitoring: None  Code Status: No CPR- Do NOT Intubate      Clinically Significant Risk Factors          # Hypocalcemia: Lowest Ca = 8.1 mg/dL in last 2 days, will monitor and replace as  appropriate     # Hypoalbuminemia: Lowest albumin = 3.3 g/dL at 10/9/2023  2:08 PM, will monitor as appropriate     # Hypertension: Noted on problem list     # Dementia: noted on problem list               Disposition Plan      Expected Discharge Date: 10/10/2023, 12:19 PM              The patient's care was discussed with the patient, family, nursing, and attending physician, Dr. Ernesto Pacheco PA-C  Hospitalist Service  [facility]  Securely message with [email or web address] (more info)  Text page via [facility] Paging/Directory   ______________________________________________________________________    Interval History   Doing better. Breathing less labored. Denies chest pain or dyspnea. No fevers. Continues to have a lot of congestion in the lungs. Per patient, quite tired and appetite low. Family bedside    Physical Exam   Vital Signs: Temp: 97.8  F (36.6  C) Temp src: Oral BP: 125/81 Pulse: 90   Resp: 18 SpO2: 94 % O2 Device: None (Room air) Oxygen Delivery: 3 LPM  Weight: 113 lbs 0 oz  General Appearance: Alert but sleepy. Appropriately arouses to voice  HEENT: Anicteric sclera, MMM   Respiratory: Mildly increased work of breathing on room air. Significant b/l congestion in all lung fields  Cardiovascular: RRR, S1, S2. No murmur noted  GI: Abdomen soft, non-tender with active bowel sounds. No guarding or rebound  Lymph/Hematologic: No peripheral edema, distal pulses palpable   Skin: No rash or jaundice   Musculoskeletal: Moves all extremities   Neurologic: No focal deficits, CN II-XII grossly intact      Medical Decision Making     Data     I have personally reviewed the following data over the past 24 hrs:    5.5  \   10.5 (L)   / 173     136 105 40.9 (H) /  114 (H)   4.2 18 (L) 1.60 (H) \     Procal: N/A CRP: 285.00 (H) Lactic Acid: N/A       INR:  N/A PTT:  N/A   D-dimer:  2.50 (H) Fibrinogen:  N/A     Ferritin:  N/A % Retic:  N/A LDH:  268 (H)

## 2023-10-10 NOTE — CONSULTS
Care Management Initial Consult    General Information  Assessment completed with: Family, Bharath-son; Deidra-JIMI  Type of CM/SW Visit: Initial Assessment    Primary Care Provider verified and updated as needed: Yes   Readmission within the last 30 days: no previous admission in last 30 days      Reason for Consult: discharge planning  Advance Care Planning: Advance Care Planning Reviewed: present on chart          Communication Assessment  Patient's communication style: spoken language (English or Bilingual)             Cognitive  Cognitive/Neuro/Behavioral: .WDL except, orientation  Level of Consciousness: confused, alert  Arousal Level: opens eyes spontaneously  Orientation: disoriented to, situation, place, time  Mood/Behavior: cooperative  Best Language: 0 - No aphasia  Speech: clear, spontaneous    Living Environment:   People in home: spouse     Current living Arrangements: house      Able to return to prior arrangements: yes       Family/Social Support:  Care provided by: spouse/significant other, child(whitney)  Provides care for: no one  Marital Status:   , Children          Description of Support System: Supportive, Involved    Support Assessment: Adequate family and caregiver support, Adequate social supports    Current Resources:   Patient receiving home care services: No     Community Resources:    Equipment currently used at home:    Supplies currently used at home:      Employment/Financial:  Employment Status: retired        Financial Concerns:             Does the patient's insurance plan have a 3 day qualifying hospital stay waiver?  No    Lifestyle & Psychosocial Needs:  Social Determinants of Health     Food Insecurity: Not on file   Depression: Not at risk (4/4/2023)    PHQ-2     PHQ-2 Score: 0   Housing Stability: Not on file   Tobacco Use: Low Risk  (10/9/2023)    Patient History     Smoking Tobacco Use: Never     Smokeless Tobacco Use: Never     Passive Exposure: Not on file    Financial Resource Strain: Not on file   Alcohol Use: Not on file   Transportation Needs: Not on file   Physical Activity: Not on file   Interpersonal Safety: Not on file   Stress: Not on file   Social Connections: Not on file       Functional Status:  Prior to admission patient needed assistance:   Dependent ADLs:: Ambulation-walker, Ambulation-cane, Bathing  Dependent IADLs:: Cleaning, Cooking, Laundry, Shopping, Meal Preparation, Medication Management, Money Management, Transportation  Assesssment of Functional Status: At functional baseline    Mental Health Status:  Mental Health Status: No Current Concerns       Chemical Dependency Status:  Chemical Dependency Status: No Current Concerns             Values/Beliefs:  Spiritual, Cultural Beliefs, Mandaeism Practices, Values that affect care:                 Additional Information:  Karen Arthur is a 88 year old female admitted on 10/9/2023 for acute hypoxic respiratory failure secondary to COVID-19 infection.     Chart reviewed. Case discussed with bedside RN and medical provider.    Writer met with pt, pt's son, Bharath, and his wife, Deidra in pt's room. Writer introduced self, discussed role and went over writer's availability. Pt lives at home with her spouse in a single level home. Pt also has 4 other kids who live in the area, who all assist in providing assistance for pt to stay in her home. Bharath reports they would like assistance with pt's bathing once or twice / week in the home. Discussed potential discharge needs to include HHC. List of Medicare certified options reviewed with patient/family/caregiver. Offered list and patient's right to choose among available options. Explained any financial ties to [place]. Bharath denied preference for HHC agency. Discussed private pay HHC as well; list provided.    Family reports they are not looking at discharging with hospice at this time as pt has improved. Family reports they are in agreement with hospice  philosophy and will pursue hospice if pt does decompensate and does become more appropriate for hospice in the future. Pt is currently DNR/DNI.    Writer [facility] intake. Referral to be made by them as needed.  Pt requires RN (med mgmt/vitals), PT/OT/HHA.     Family asked if they could get a home suctioning device. Discussed with provider. DME order to be placed and SW will f/up with  DME to see if insurance will cover.    Writer spoke with RT at [facility] (P) [phone] (F) [phone]. Pt does not have a correct Dx. Discussed with MD. Pt does not meet criteria; one will not be ordered at Discharge. Writer updated Deidra in pt's room. Family does not want to private pay for one.    Family hoping to stay another night. Writer spoke with provider. Provider to meet with family to discuss.     Social work will continue to follow and provide assistance to ensure a safe and timely discharge.     ________________    PINKY Chapin, [facility]  ED/Observation   [facility]  Phone: [phone]  Pager: [phone]  Fax: [phone]    On-call pager, [phone], 4:00pm to midnight

## 2023-10-10 NOTE — DISCHARGE SUMMARY
[facility]  Hospitalist Discharge Summary      Date of Admission: 10/9/2023  Date of Discharge: 10/10/2023  Discharging Provider: Apple Pacheco PA-C/Dr. Orozco  Discharge Service: Hospitalist Service, [facility] TE[facility]     Discharge Diagnoses   COVID 19  Possible aspiration PNA  CKD  Hypothyroidism  Scoliosis, chronic pain back  HTN  CAD  HLD    NCIM  Alzheimer's dementia    Clinically Significant Risk Factors          Follow-ups Needed After Discharge   - Follow up with PCP within 1 week: CBC, BMP, and TSH/T4 within 48 hours. Please continue to discuss long-term goals of care    Unresulted Labs Ordered in the Past 30 Days of this Admission       Date and Time Order Name Status Description    10/9/2023  3:04 PM Blood Culture Hand, Right Preliminary     10/9/2023  2:22 PM Blood Culture Peripheral Blood Preliminary         These results will be followed up by [facility]    Discharge Disposition   Discharged to home  Condition at discharge: Stable    Hospital Course   Karen Arthur is a 88 year old female admitted on 10/9/2023 for acute hypoxic respiratory failure secondary to COVID-19 infection.     COVID-19 infection, likely aspiration PNA: New symptoms 10/6. Presented to the ED 10/9 with new hypoxia. CXR 10/9 increased RLL opacity c/w consolidation. Treated with Unasyn 10/9 to 10/10, dexamethasone, Remdesivir, baricitinib, albuterol, pulmonary toilet.   - Narrow to Augmentin to treat for 7 days  - SLP consulted: patient should continue soft diet on discharge  - Suction prn ordered for discharge  - Home PT, OT, nursing, and head aid ordered for discharge    Acute Hypoxic Respiratory Failure secondary to COVID-19 infection, resolved: Admitted with new hypoxia. Workup as above. Weaned to room air with stable O2 sats with above treatment and pulmonary toilet     CKD III with mild Cr rise: BL Cr around 1.5 over the past year. Cr 1.6 on day of discharge.  Patient should have repeat labs in the next 48-72 hours. Avoid nephrotoxic meds    Goals of care: Patient is DNR/DNI. Hospice consult considered but deferred given patient/family wish to discharge. Continue close OP follow up with PCP to discuss ongoing goals of care    Hypothyroidism: TSH 5.5. Continue synthroid, trend TFTs with PCP    Scoliosis, chronic back pain: Continue supportive cares     HTN, CAD,HLD, , Nonischemic cardiomyopathy: No acute concerns. Continue PTA meds on discharge      Alzheimer's dementia: Resume PTA memantadine on discharge        Consultations This Hospital Stay   PT, SLP, CC, SW    Code Status   No CPR- Do NOT Intubate    Time Spent on this Encounter   I, Apple Pacheco PA-C, personally saw the patient today and spent greater than 30 minutes discharging this patient.       Apple Pacheco PA-C  [facility] EMERGENCY DEPARTMENT  [address] [address]  Phone: [phone]  ______________________________________________________________________    Physical Exam   Vital Signs: Temp: 97.8  F (36.6  C) Temp src: Oral BP: 133/72 Pulse: 86   Resp: 18 SpO2: 97 % O2 Device: Nasal cannula with humidification Oxygen Delivery: 3 LPM  Weight: 113 lbs 0 oz  General Appearance: Alert and oriented x3, pleasant  HEENT: Anicteric sclera, MMM   Respiratory: Breathing comfortably on room air. Crackles noted  Cardiovascular: RRR, S1, S2. No murmur noted  GI: Abdomen soft, non-tender with active bowel sounds. No guarding or rebound  Lymph/Hematologic: No peripheral edema, distal pulses palpable   Skin: No rash or jaundice   Musculoskeletal: Moves all extremities   Neurologic: No focal deficits, CN II-XII grossly intact         Primary Care Physician   Kehinde Tate    Discharge Orders   No discharge procedures on file.    Significant Results and Procedures   Most Recent 3 CBC's:  Recent Labs   Lab Test 10/10/23  0546 10/09/23  1408 23  1529   WBC 5.5 3.0* 5.9    HGB 10.5* 11.3* 11.3*   MCV 95 96 100    167 226     Most Recent 3 BMP's:  Recent Labs   Lab Test 10/10/23  0546 10/09/23  1408 04/04/23  1529    133* 140   POTASSIUM 4.2 4.8 4.8   CHLORIDE 105 100 105   CO2 18* 17* 22   BUN 40.9* 36.5* 38.5*   CR 1.60* 1.54* 1.47*   ANIONGAP 13 16* 13   TRACY 8.1* 8.7* 9.8   * 95 80     Most Recent 2 LFT's:  Recent Labs   Lab Test 10/09/23  1408 04/04/23  1529   AST 57* 25   ALT 28 15   ALKPHOS 90 83   BILITOTAL 0.3 0.2     Most Recent 3 INR's:  Recent Labs   Lab Test 02/14/20  1140   INR 0.97       Discharge Medications   Current Discharge Medication List        CONTINUE these medications which have NOT CHANGED    Details   acetaminophen (TYLENOL) 500 MG tablet Take 1,000 mg by mouth 3 times daily      aspirin 325 MG EC tablet Take 325 mg by mouth daily      carvedilol (COREG) 12.5 MG tablet Take 1 tablet (12.5 mg total) by mouth 2 (two) times a day.  Qty: 180 tablet, Refills: 3    Associated Diagnoses: Nonischemic cardiomyopathy (H)      cholecalciferol, vitamin D3, 50 mcg (2,000 unit) Tab [CHOLECALCIFEROL, VITAMIN D3, 50 MCG (2,000 UNIT) TAB] 1 daily  Refills: 0    Associated Diagnoses: Vitamin D deficiency      LANsoprazole (PREVACID) 30 MG DR capsule Take 1 capsule (30 mg total) by mouth daily.  Qty: 90 capsule, Refills: 3    Associated Diagnoses: Nausea      levothyroxine (SYNTHROID/LEVOTHROID) 88 MCG tablet Take 1 tablet (88 mcg) by mouth daily  Qty: 90 tablet, Refills: 3    Comments: Replaces 75 mcg dose.  Disregard previous prescription.  Associated Diagnoses: Hypothyroidism, unspecified type      losartan (COZAAR) 50 MG tablet Take 1 tablet (50 mg total) by mouth daily.  Qty: 90 tablet, Refills: 3    Associated Diagnoses: Essential hypertension      melatonin 3 mg cap [MELATONIN 3 MG CAP] Take 3 mg by mouth at bedtime.  Refills: 0    Associated Diagnoses: Chronic insomnia      memantine (NAMENDA) 10 MG tablet Take 1 tablet (10 mg) by mouth 2 times  daily  Qty: 180 tablet, Refills: 3    Associated Diagnoses: Alzheimer's dementia without behavioral disturbance (H)      Multiple Vitamins-Minerals (ICAPS AREDS 2 PO) Take 1 tablet by mouth 2 times daily      nabumetone (RELAFEN) 750 MG tablet Take 750 mg by mouth daily      polyethylene glycol (MIRALAX) 17 gram packet Take 17 g by mouth daily as needed  Qty: 14 each      rosuvastatin (CRESTOR) 40 MG tablet Take 40 mg by mouth at bedtime           Allergies   Allergies   Allergen Reactions    Aricept [Donepezil] Unknown     Leg cramps    Exelon [Rivastigmine] Unknown     Nausea/vomiting    Lisinopril Unknown     Hypotension    Statins-Hmg-Coa Reductase Inhibitors [Statins] Muscle Pain (Myalgia)    Sulfa (Sulfonamide Antibiotics) [Sulfa Antibiotics] Rash

## 2023-10-10 NOTE — PLAN OF CARE
Goal Outcome Evaluation:      Plan of Care Reviewed With: child    Overall Patient Progress: improvingOverall Patient Progress: improving

## 2023-10-10 NOTE — PROGRESS NOTES
A:   Neuro: A/Ox3, disoriented to situation, and forgetful. Pleasant and cooperative, family in the room overnight.   Cardiac/Tele: SR, Afebrile, VSS . Denies chest pain   Respiratory: Lungs sound congested, suctions frequently, tolerating well, Sat >92% on HHF Fi02 35, 25L  GI/: Continent, uses bedside commode.  Diet/Appetite: Currently on NPO with ice chips and meds with sip of water, swallows well, no cough observed  Skin: No new deficits noted.   LDAs: PIV- SL  Activity: A x 2 with GB  Pain: Denies     P: Continue to monitor and notify team with changes.

## 2023-10-10 NOTE — MEDICATION SCRIBE - ADMISSION MEDICATION HISTORY
Medication Scribe Admission Medication History    Admission medication history is complete. The information provided in this note is only as accurate as the sources available at the time of the update.    Information Source(s): Family member via phone    Pertinent Information:   -Pt was unavailable but daughter picked up and she is the one in charge of her mother's medication.  -Pt's daughter stated that pt's last dose of her Molnupiravir 200 mg on Sunday (10/8) morning     Changes made to PTA medication list:  Added: Areds 2  Deleted: Molnupiravir 200 mg  Changed: Rosuvastatin 20 mg --> 40 mg, Nabumetone 750 mg (BID) --> (every day)    Medication Affordability: no       Allergies reviewed with patient and updates made in EHR: yes    Medication History Completed By: Yuliana Swartz 10/9/2023 7:06 PM    PTA Med List   Medication Sig Last Dose    acetaminophen (TYLENOL) 500 MG tablet Take 1,000 mg by mouth 3 times daily 10/8/2023 at am    aspirin 325 MG EC tablet Take 325 mg by mouth daily 10/8/2023 at am    carvedilol (COREG) 12.5 MG tablet Take 1 tablet (12.5 mg total) by mouth 2 (two) times a day. 10/8/2023 at am    cholecalciferol, vitamin D3, 50 mcg (2,000 unit) Tab [CHOLECALCIFEROL, VITAMIN D3, 50 MCG (2,000 UNIT) TAB] 1 daily 10/8/2023 at am    LANsoprazole (PREVACID) 30 MG DR capsule Take 1 capsule (30 mg total) by mouth daily. 10/8/2023 at am    levothyroxine (SYNTHROID/LEVOTHROID) 88 MCG tablet Take 1 tablet (88 mcg) by mouth daily 10/8/2023 at pm    losartan (COZAAR) 50 MG tablet Take 1 tablet (50 mg total) by mouth daily. 10/7/2023 at pm    melatonin 3 mg cap [MELATONIN 3 MG CAP] Take 3 mg by mouth at bedtime. 10/7/2023 at pm    memantine (NAMENDA) 10 MG tablet Take 1 tablet (10 mg) by mouth 2 times daily 10/8/2023 at am    Multiple Vitamins-Minerals (ICAPS AREDS 2 PO) Take 1 tablet by mouth 2 times daily 10/8/2023 at am    nabumetone (RELAFEN) 750 MG tablet Take 750 mg by mouth daily 10/8/2023 at am     polyethylene glycol (MIRALAX) 17 gram packet Take 17 g by mouth daily as needed 10/6/2023 at unknown    rosuvastatin (CRESTOR) 40 MG tablet Take 40 mg by mouth at bedtime 10/7/2023 at pm

## 2023-10-11 NOTE — PROGRESS NOTES
Hospital Medicine  Patient now on unit - /64 (BP Location: Left arm, Patient Position: Semi-Mac's, Cuff Size: Adult Regular)   Pulse 80   Temp 97.4  F (36.3  C) (Oral)   Resp 17   Wt 52.2 kg (115 lb 1.3 oz)   SpO2 92%   BMI 20.71 kg/m    H&P in the chart.  Hamilton Fagan MD

## 2023-10-11 NOTE — PROGRESS NOTES
Admission/Transfer from: ED  2 RN skin assessment completed. Yes, with ELSA Jones  Significant findings include: No overt issues noted. Skin appears intact.   WOC Nurse Consult Ordered? No

## 2023-10-11 NOTE — PROGRESS NOTES
"   10/11/23 0902   Appointment Info   Signing Clinician's Name / Credentials (PT) Korin Washburn, PT,DPT   Rehab Comments (PT) covid       Present no   Living Environment   People in Home spouse   Current Living Arrangements house   Home Accessibility stairs to enter home   Number of Stairs, Main Entrance 4   Stair Railings, Main Entrance railings safe and in good condition;railings on both sides of stairs   Transportation Anticipated family or friend will provide   Living Environment Comments Pt with decreased cognition; son present to provide info. Reports pt lives in a one-level home with her spouse who is present/available to assist. 4 FRANCISCO JAVIER with B handrails. Walk-in shower with grabbars and shower chair. Family provides transportation. Son reports family can piece together 24/7 support following d/c.   Self-Care   Usual Activity Tolerance good   Current Activity Tolerance fair   Regular Exercise Yes   Activity/Exercise Type walking   Exercise Amount/Frequency 3-5 times/wk   Equipment Currently Used at Home cane, straight;walker, standard   Fall history within last six months no   Activity/Exercise/Self-Care Comment Son reports Samuel with ADL's and mobility; utilizes SPC within home and FWW in community. No falls reported. Walks throughout week for exercise.   General Information   Onset of Illness/Injury or Date of Surgery 10/09/23   Referring Physician Carina Adams MD   Patient/Family Therapy Goals Statement (PT) return home   Pertinent History of Current Problem (include personal factors and/or comorbidities that impact the POC) per EMR: \"Karen Arthur is a 88 year old female admitted on 10/9/2023 for acute hypoxic respiratory failure secondary to COVID-19 infection.\"   Existing Precautions/Restrictions fall   Weight-Bearing Status - LUE full weight-bearing   Weight-Bearing Status - RUE full weight-bearing   Weight-Bearing Status - LLE full weight-bearing   Weight-Bearing Status - " RLE full weight-bearing   General Observations Activity: ambulate in room   Cognition   Orientation Status (Cognition) oriented to;person   Pain Assessment   Patient Currently in Pain No   Integumentary/Edema   Integumentary/Edema no deficits were identifed   Posture    Posture Forward head position;Protracted shoulders   Range of Motion (ROM)   Range of Motion ROM deficits secondary to pain   Strength (Manual Muscle Testing)   Strength (Manual Muscle Testing) Deficits observed during functional mobility   Strength Comments grossly 3/5 in BLE; limited by pain   Bed Mobility   Comment, (Bed Mobility) supine > sit with SBA/CGA and HOB slightly elevated   Transfers   Comment, (Transfers) sit > stand with min A and FWW   Gait/Stairs (Locomotion)   Distance in Feet 2ft   Comment, (Gait/Stairs) Ambulated 2ft with FWW and min A   Balance   Balance other (describe)   Balance Comments fair sitting balance; fair(-)/fair standing balance   Sensory Examination   Sensory Perception patient reports no sensory changes   Sensory Perception Comments may be inaccurate 2/2 cognition   Coordination   Coordination other (see comments)   Coordination Comments decreased coordination and motor planning   Muscle Tone   Muscle Tone no deficits were identified   Clinical Impression   Criteria for Skilled Therapeutic Intervention Yes, treatment indicated   PT Diagnosis (PT) impaired functional mobility   Influenced by the following impairments decreased balance, strength, and endurance   Functional limitations due to impairments difficulty with functional mobility   Clinical Presentation (PT Evaluation Complexity) stable   Clinical Presentation Rationale per clinical judgment   Clinical Decision Making (Complexity) low complexity   Planned Therapy Interventions (PT) balance training;bed mobility training;gait training;home exercise program;motor coordination training;neuromuscular re-education;patient/family education;postural re-education;ROM  (range of motion);stair training;stretching;strengthening;transfer training;progressive activity/exercise;risk factor education;home program guidelines   Risk & Benefits of therapy have been explained   (tbd, anticipated none)   PT Total Evaluation Time   PT Nidiaal, Low Complexity Minutes (96520) 5   Physical Therapy Goals   PT Frequency 6x/week   PT Predicted Duration/Target Date for Goal Attainment 10/18/23   PT: Bed Mobility Independent;Supine to/from sit;Rolling;Bridging  (with HOB flat)   PT: Transfers Modified independent;Sit to/from stand;Bed to/from chair  (with LRAD)   PT: Gait Modified independent;150 feet  (with LRAD)   PT: Stairs Supervision/stand-by assist;4 stairs;Rail on both sides   PT Discharge Planning   PT Plan repeated sit <> stands, gait in room, step-ups on aerobic step, BLE ther ex   PT Discharge Recommendation (DC Rec) home with assist;home with home care physical therapy  (HH OT)   PT Rationale for DC Rec Pt is demonstrating functional mobility below baseline and is limited by decreased endurance and motor planning. Once medically appropriate, anticipated to d/c home with 24/7 assist and supervision from family for ADL's and mobility with FWW. Son reports family can provide this level of care. Will also benefit from HH PT/OT to maintain functional IND and safety within home and reduce caregiver burden and risk for falls. Son in agreement to plan.   PT Brief overview of current status Ax1-2 with FWW and gait belt; encourage up to recliner and walking in room throughout day; ok to mobilize up to bathroom with nursing   PT Equipment Needed at Discharge other (see comments)  (tbd)

## 2023-10-11 NOTE — PROGRESS NOTES
"   10/11/23 0902   Appointment Info   Signing Clinician's Name / Credentials (PT) Korin Washbrun, PT,DPT   Rehab Comments (PT) covid       Present no   Living Environment   People in Home spouse   Current Living Arrangements house   Home Accessibility stairs to enter home   Number of Stairs, Main Entrance 4   Stair Railings, Main Entrance railings safe and in good condition;railings on both sides of stairs   Transportation Anticipated family or friend will provide   Living Environment Comments Pt with decreased cognition; son present to provide info. Reports pt lives in a one-level home with her spouse who is present/available to assist. 4 FRANCISCO JAVIER with B handrails. Walk-in shower with grabbars and shower chair. Family provides transportation. Son reports family can piece together 24/7 support following d/c.   Self-Care   Usual Activity Tolerance good   Current Activity Tolerance fair   Regular Exercise Yes   Activity/Exercise Type walking   Exercise Amount/Frequency 3-5 times/wk   Equipment Currently Used at Home cane, straight;walker, standard   Fall history within last six months no   Activity/Exercise/Self-Care Comment Son reports Samuel with ADL's and mobility; utilizes SPC within home and FWW in community. No falls reported. Walks throughout week for exercise.   General Information   Onset of Illness/Injury or Date of Surgery 10/09/23   Referring Physician Carina Adams MD   Patient/Family Therapy Goals Statement (PT) return home   Pertinent History of Current Problem (include personal factors and/or comorbidities that impact the POC) per EMR: \"Karen Arthur is a 88 year old female admitted on 10/9/2023 for acute hypoxic respiratory failure secondary to COVID-19 infection.\"   Existing Precautions/Restrictions fall   Weight-Bearing Status - LUE full weight-bearing   Weight-Bearing Status - RUE full weight-bearing   Weight-Bearing Status - LLE full weight-bearing   Weight-Bearing Status - " RLE full weight-bearing   General Observations Activity: ambulate in room   Cognition   Orientation Status (Cognition) oriented to;person   Pain Assessment   Patient Currently in Pain No   Integumentary/Edema   Integumentary/Edema no deficits were identifed   Posture    Posture Forward head position;Protracted shoulders   Range of Motion (ROM)   Range of Motion ROM deficits secondary to pain   Strength (Manual Muscle Testing)   Strength (Manual Muscle Testing) Deficits observed during functional mobility   Strength Comments grossly 3/5 in BLE; limited by pain   Bed Mobility   Comment, (Bed Mobility) supine > sit with SBA/CGA and HOB slightly elevated   Transfers   Comment, (Transfers) sit > stand with min A and FWW   Gait/Stairs (Locomotion)   Distance in Feet 2ft   Comment, (Gait/Stairs) Ambulated 2ft with FWW and min A   Balance   Balance other (describe)   Balance Comments fair sitting balance; fair(-)/fair standing balance   Sensory Examination   Sensory Perception patient reports no sensory changes   Sensory Perception Comments may be inaccurate 2/2 cognition   Coordination   Coordination other (see comments)   Coordination Comments decreased coordination and motor planning   Muscle Tone   Muscle Tone no deficits were identified   Clinical Impression   Criteria for Skilled Therapeutic Intervention Yes, treatment indicated   PT Diagnosis (PT) impaired functional mobility   Influenced by the following impairments decreased balance, strength, and endurance   Functional limitations due to impairments difficulty with functional mobility   Clinical Presentation (PT Evaluation Complexity) stable   Clinical Presentation Rationale per clinical judgment   Clinical Decision Making (Complexity) low complexity   Planned Therapy Interventions (PT) balance training;bed mobility training;gait training;home exercise program;motor coordination training;neuromuscular re-education;patient/family education;postural re-education;ROM  (range of motion);stair training;stretching;strengthening;transfer training;progressive activity/exercise;risk factor education;home program guidelines   Risk & Benefits of therapy have been explained evaluation/treatment results reviewed;care plan/treatment goals reviewed;risks/benefits reviewed;participants voiced agreement with care plan;current/potential barriers reviewed;participants included;patient;son   PT Total Evaluation Time   PT Eval, Low Complexity Minutes (23838) 5   Physical Therapy Goals   PT Frequency 6x/week   PT Predicted Duration/Target Date for Goal Attainment 10/18/23   PT: Bed Mobility Independent;Supine to/from sit;Rolling;Bridging  (with HOB flat)   PT: Transfers Modified independent;Sit to/from stand;Bed to/from chair  (with LRAD)   PT: Gait Modified independent;150 feet  (with LRAD)   PT: Stairs Supervision/stand-by assist;4 stairs;Rail on both sides   PT Discharge Planning   PT Plan repeated sit <> stands, gait in room, step-ups on aerobic step, BLE ther ex   PT Discharge Recommendation (DC Rec) home with assist;home with home care physical therapy  (HH OT)   PT Rationale for DC Rec Pt is demonstrating functional mobility below baseline and is limited by decreased endurance and motor planning. Once medically appropriate, anticipated to d/c home with 24/7 assist and supervision from family for ADL's and mobility with FWW. Son reports family can provide this level of care. Will also benefit from HH PT/OT to maintain functional IND and safety within home and reduce caregiver burden and risk for falls. Son in agreement to plan.   PT Brief overview of current status Ax1-2 with FWW and gait belt; encourage up to recliner and walking in room throughout day; ok to mobilize up to bathroom with nursing   PT Equipment Needed at Discharge other (see comments)  (tbd)

## 2023-10-11 NOTE — PLAN OF CARE
Goal Outcome Evaluation:      Plan of Care Reviewed With: patient, child    Overall Patient Progress: improvingOverall Patient Progress: improving    Outcome Evaluation: A&Ox2-3, disoriented to time/situation at baseline. VSS on RA. PIV-SL; continuous fluids declined by family; urinating adequately, tolerating oral fluid intake. No overt skin issues noted. Pt denies pain. Crackles ascultated; congested cough continuned. Oral cathater at bedside for suctioning. Son is present & attentive to pt's needs. Continue w/ POC; possible discharge today.    Does my patient have a central line? (PICC, CVC)  YES/NO: No

## 2023-10-11 NOTE — PLAN OF CARE
Goal Outcome Evaluation:      Plan of Care Reviewed With: patient, child    Overall Patient Progress: improving    Outcome Evaluation: Pt weaned to RA. WOB improved. Cough continues, productive. Improved appetite and PO intake. Medically ready for discharge.    [id number]. Oriented only to self, baseline. Denies pain. Weaned to RA, stable on continuous pulse ox. All other VSS on RA. Denies SOB. Productive cough. Coarse LS. Tolerating regular diet. Loose stools. Urinating spontaneously, good output.     Discharge to: home with home care   Transportation: son driving. Brought to lobby via   Time: 1630  Prescriptions: prescriptions sent to [facility] pharmacy in [place] per family request   Belongings: clothing and shoes, worn by pt at discharge. No belongings kept in security or home meds in [facility] pharmacy.   Access: PIV removed.   Care plan and education discontinued: complete   Paperwork: AVS reviewed with SonBharath. Verbalized understanding of care plan.

## 2023-10-11 NOTE — DISCHARGE SUMMARY
[facility]  Hospitalist Discharge Summary      Date of Admission: 10/9/2023  Date of Discharge: 10/10/2023  Discharging Provider: LASHAWN Lopes CNP/Dr. Orozco  Discharge Service: Hospitalist Service, GOLD TEAM 5    Discharge Diagnoses   COVID 19  Possible aspiration PNA  CKD  Hypothyroidism  Scoliosis, chronic pain back  HTN  CAD  HLD    NCIM  Alzheimer's dementia    Clinically Significant Risk Factors          Follow-ups Needed After Discharge   - Follow up with PCP within 1 week: CBC, BMP, and TSH/T4 within 7 days. Please continue to discuss long-term goals of care    Unresulted Labs Ordered in the Past 30 Days of this Admission       Date and Time Order Name Status Description    10/9/2023  3:04 PM Blood Culture Hand, Right Preliminary     10/9/2023  2:22 PM Blood Culture Peripheral Blood Preliminary         These results will be followed up by Vah[facility]    Discharge Disposition   Discharged to home  Condition at discharge: Stable    Hospital Course   Karen Arthur is a 88 year old female admitted on 10/9/2023 for acute hypoxic respiratory failure secondary to COVID-19 infection.     COVID-19 infection, likely aspiration PNA: New symptoms 10/6. Presented to the ED 10/9 with new hypoxia. CXR 10/9 increased RLL opacity c/w consolidation. Treated with Unasyn 10/9 to 10/10, dexamethasone, Remdesivir, baricitinib, albuterol, pulmonary toilet. Greater than 24 hrs on RA with no desaturations prior to discharge with movement.   - Narrow to Augmentin to treat for 7-day antibiotic course  - PRN albuterol for wheezing     Acute Hypoxic Respiratory Failure secondary to COVID-19 infection, resolved: Admitted with new hypoxia. Workup as above. Weaned to room air with stable O2 sats with above treatment and pulmonary toilet. Intermittent O2 dips while asleep, but remained on RA for 24 hours prior to discharge with no desaturations after exercise.  - No  indications for home O2 at discharge      CKD III with mild Cr rise: BL Cr around 1.5 over the past year. Cr 1.32 upon discharge  -Trend BMP with PCP in 1-2 weeks with PCP  - Hold nametone until checking Cr with PCP      Goals of care: Patient is DNR/DNI. Hospice consult considered but will plan for continued close OP follow up with PCP to discuss ongoing goals of care.      Hypothyroidism: TSH 5.5. Continue synthroid. Trend TFTs OP     Scoliosis, chronic back pain: Continue supportive cares. Resume nabumetone after checking Cr function OP with PCP.      HTN, CAD,HLD, , Nonischemic cardiomyopathy: No acute concerns. Continue PTA meds     Alzheimer's dementia: Resume PTA memantadine    Consultations This Hospital Stay   PT, SLP, CC, SW    Code Status   No CPR- Do NOT Intubate    Time Spent on this Encounter   I, LASHAWN Lubin, CNP, personally saw the patient today and spent greater than 30 minutes discharging this patient.       LASHAWN Lopes CNP  [facility] MED SURG  [address] [facility] [facility] [address]  Phone: [phone]  ______________________________________________________________________    Physical Exam   Vital Signs: Temp: 97.6  F (36.4  C) Temp src: Oral BP: (!) 146/73 Pulse: 67   Resp: 16 SpO2: 94 % O2 Device: None (Room air)    Weight: 115 lbs 1.28 oz  General Appearance: Alert and oriented x3, pleasant but fatigued   HEENT: Anicteric sclera, MMM   Respiratory: Breathing comfortably on room air. LS clear B/L, mild congested cough noted  Cardiovascular: RRR, S1, S2. No murmur noted, no peripheral edema noted   GI: Abdomen soft, non-tender with active bowel sounds. No guarding or rebound  Lymph/Hematologic: No peripheral edema, distal pulses palpable   Skin: No rash or jaundice   Musculoskeletal: Moves all extremities   Neurologic: No focal deficits, CN II-XII grossly intact         Primary Care Physician   Kehinde Tate    Discharge Orders      [facility]   COVID Pathway      Home Care Referral      Primary Care - Care Coordination Referral      Reason for your hospital stay    You were admitted with COVID 19 infection and aspiration PNA requiring oxygen and IV antibiotics. You were treated with routine COVID medications, supplemental oxygen, antibiotics, and aggressive pulmonary toilet. You discharged home on oral antibiotics when you were off oxygen and medically stable.     Activity    Your activity upon discharge: activity as tolerated     When to contact your care team    Call or return if you develop recurrent shortness of breath or difficulty breathing, fever>100.4, worsening confusion or altered mental status, chest pain, uncontrolled cough, or other symptom of concern to you.     Reason for your hospital stay    You were hospitalized for acute hypoxic respiratory failure secondary to COVID-19 infection. You were treated with antibiotics, dexamethasone, Remdesivir, baricitinib,  and albuterol.  You oxygen needs drastically improved and you were safe to go home on room air. We are sending you home with a prescription for Augmentin to treat an aspiration pneumonia.     Contact provider    Contact your primary care provider if If increased trouble breathing, new arm/leg swelling, dizziness/passing out, falls, bleeding that doesn't stop, or uncontrolled pain.     Discharge - Quarantine/Isolation Instruction    Date of symptom onset: 10/6/23   Date of first positive test: 10/6/23  If you tested positive COVID-19 and show symptoms (fever, cough, body aches or trouble breathing):        Stay home and away from others (self-isolate) until:        At least 10 days have passed since your symptoms started. AND...        You've had no fever-and no medicine that reduces fever for 1 full day (24 hours). AND...         Your other symptoms have resolved (gotten better).  If you tested positive for COVID-19 but don't show symptoms:       Stay home and away from others  (self-isolate) until at least 10 days have passed since the date of your first positive COVID-19 test.     Adult [facility] Follow-up and recommended labs and tests    Follow up with primary care provider, Kehinde Tate, within 7 days for hospital follow- up.  The following labs/tests are recommended: CBC, BMP, TSH/T4 within 7 days. Please discuss restart with nubumetone restart after checking kidney function.     Appointments on [place] and/or [facility] (with [facility] or [facility] provider or service). Call [phone] if you haven't heard regarding these appointments within 7 days of discharge.     No CPR- Do NOT Intubate     Miscellaneous DME Order    DME Documentation:   Describe the reason for need to support medical necessity: Increased sputum production in the setting of pneumonia and COVID.     I, the undersigned, certify that the above prescribed supplies are medically necessary for this patient and is both reasonable and necessary in reference to accepted standards of medical and necessary in reference to accepted standards of medical practice in the treatment of this patient's condition and is not prescribed as a convenience.     Diet    Follow this diet upon discharge: Orders Placed This Encounter      Easy to Chew Diet (level 7)       Significant Results and Procedures   Most Recent 3 CBC's:  Recent Labs   Lab Test 10/11/23  0653 10/10/23  0546 10/09/23  1408   WBC 9.6 5.5 3.0*   HGB 9.6* 10.5* 11.3*   MCV 93 95 96    173 167     Most Recent 3 BMP's:  Recent Labs   Lab Test 10/11/23  0653 10/10/23  0546 10/09/23  1408    136 133*   POTASSIUM 4.3 4.2 4.8   CHLORIDE 106 105 100   CO2 18* 18* 17*   BUN 47.5* 40.9* 36.5*   CR 1.32* 1.60* 1.54*   ANIONGAP 14 13 16*   TRACY 8.0* 8.1* 8.7*   * 114* 95     Most Recent 2 LFT's:  Recent Labs   Lab Test 10/09/23  1408 04/04/23  1529   AST 57* 25   ALT 28 15   ALKPHOS 90 83   BILITOTAL 0.3 0.2     Most Recent 3 INR's:  Recent Labs   Lab Test  02/14/20  1140   INR 0.97       Discharge Medications   Current Discharge Medication List        START taking these medications    Details   albuterol (PROAIR HFA/PROVENTIL HFA/VENTOLIN HFA) 108 (90 Base) MCG/ACT inhaler Inhale 2 puffs into the lungs every 4 hours as needed for wheezing  Qty: 18 g, Refills: 0    Comments: Pharmacy may dispense brand covered by insurance (Proair, or proventil or ventolin or generic albuterol inhaler)  Associated Diagnoses: Infection due to 2019 novel coronavirus      amoxicillin-clavulanate (AUGMENTIN) 400-57 MG chewable tablet Take 1 tablet by mouth every 12 hours  Qty: 12 tablet, Refills: 0    Associated Diagnoses: Infection due to 2019 novel coronavirus      carboxymethylcellulose PF (REFRESH PLUS) 0.5 % ophthalmic solution Place 1 drop into both eyes every hour as needed for dry eyes  Qty: 1 each, Refills: 0    Associated Diagnoses: Infection due to 2019 novel coronavirus           CONTINUE these medications which have NOT CHANGED    Details   acetaminophen (TYLENOL) 500 MG tablet Take 1,000 mg by mouth 3 times daily      aspirin 325 MG EC tablet Take 325 mg by mouth daily      carvedilol (COREG) 12.5 MG tablet Take 1 tablet (12.5 mg total) by mouth 2 (two) times a day.  Qty: 180 tablet, Refills: 3    Associated Diagnoses: Nonischemic cardiomyopathy (H)      cholecalciferol, vitamin D3, 50 mcg (2,000 unit) Tab [CHOLECALCIFEROL, VITAMIN D3, 50 MCG (2,000 UNIT) TAB] 1 daily  Refills: 0    Associated Diagnoses: Vitamin D deficiency      LANsoprazole (PREVACID) 30 MG DR capsule Take 1 capsule (30 mg total) by mouth daily.  Qty: 90 capsule, Refills: 3    Associated Diagnoses: Nausea      levothyroxine (SYNTHROID/LEVOTHROID) 88 MCG tablet Take 1 tablet (88 mcg) by mouth daily  Qty: 90 tablet, Refills: 3    Comments: Replaces 75 mcg dose.  Disregard previous prescription.  Associated Diagnoses: Hypothyroidism, unspecified type      losartan (COZAAR) 50 MG tablet Take 1 tablet (50 mg  total) by mouth daily.  Qty: 90 tablet, Refills: 3    Associated Diagnoses: Essential hypertension      melatonin 3 mg cap [MELATONIN 3 MG CAP] Take 3 mg by mouth at bedtime.  Refills: 0    Associated Diagnoses: Chronic insomnia      memantine (NAMENDA) 10 MG tablet Take 1 tablet (10 mg) by mouth 2 times daily  Qty: 180 tablet, Refills: 3    Associated Diagnoses: Alzheimer's dementia without behavioral disturbance (H)      Multiple Vitamins-Minerals (ICAPS AREDS 2 PO) Take 1 tablet by mouth 2 times daily      polyethylene glycol (MIRALAX) 17 gram packet Take 17 g by mouth daily as needed  Qty: 14 each      rosuvastatin (CRESTOR) 40 MG tablet Take 40 mg by mouth at bedtime           STOP taking these medications       nabumetone (RELAFEN) 750 MG tablet Comments:   Reason for Stopping:         nabumetone (RELAFEN) 750 MG tablet Comments:   Reason for Stopping:             Allergies   Allergies   Allergen Reactions    Aricept [Donepezil] Unknown     Leg cramps    Exelon [Rivastigmine] Unknown     Nausea/vomiting    Lisinopril Unknown     Hypotension    Statins-Hmg-Coa Reductase Inhibitors [Statins] Muscle Pain (Myalgia)    Sulfa (Sulfonamide Antibiotics) [Sulfa Antibiotics] Rash

## 2023-10-11 NOTE — PROGRESS NOTES
Care Management Discharge Note    Discharge Date: 10/11/2023       Discharge Disposition: Home, Home Care    Discharge Services: None    Discharge DME: None    Discharge Transportation: family or friend will provide    Private pay costs discussed: Not applicable    Does the patient's insurance plan have a 3 day qualifying hospital stay waiver?  No    PAS Confirmation Code: N/A  Patient/family educated on Medicare website which has current facility and service quality ratings: yes    Education Provided on the Discharge Plan: Yes  Persons Notified of Discharge Plans: Patient, patient's family, [facility], provider, nursing staff, SW  Patient/Family in Agreement with the Plan: yes    Handoff Referral Completed: Yes    Additional Information:  Patient will discharge home with home care through [facility]. Patient's family will provide transportation home    SW called [facility] and updated them that the patient will be discharging home this afternoon.     DREA messaged the provider @[phone] requesting they place home care orders ASAP.    [facility]  P: [phone]  F: [phone]    [address]  [address]   Office: [phone]  Pager: [phone]  [email or web address]

## 2023-10-11 NOTE — PLAN OF CARE
Pt VSS, afebrile. Continues on room air, continuous oximetry overnight while asleep. Productive cough, using soft catheter to assist with sputum. Lungs clear/coarse/crackles. Family at bedside, supportive with cares. Pt up to chair to eat dinner, assist of 1-2. Ate approx 30% of meal, good PO fluid intake. LR MIVF at 100cc/hr. Using pillow supports for positioning r/t scoliosis. Denies pain, getting scheduled tylenol. Incontinent x 1, has brief on in bed. Small amount loose stool. Remdesivir IV given.     Continue supportive care, assess for changes. Possible discharge to home tomorrow.     Goal Outcome Evaluation: progressing.

## 2023-10-12 NOTE — PROGRESS NOTES
Clinic Care Coordination Contact  [facility]: Post-Discharge Note  SITUATION                                                      Admission:    Admission Date: 10/09/23   Reason for Admission: Shortness of Breath       Increase fatigue and decrease sat  Discharge:   Discharge Date: 10/11/23  Discharge Diagnosis: COVID 19  Possible aspiration PNA  CKD  Hypothyroidism  Scoliosis, chronic pain back  HTN  CAD  HLD    NCIM  Alzheimer's dementia    BACKGROUND                                                      Per hospital discharge summary and inpatient provider notes:  Karen Arthur is a 88 year old female admitted on 10/9/2023 for acute hypoxic respiratory failure secondary to COVID-19 infection.     COVID-19 infection, likely aspiration PNA: New symptoms 10/6. Presented to the ED 10/9 with new hypoxia. CXR 10/9 increased RLL opacity c/w consolidation. Treated with Unasyn 10/9 to 10/10, dexamethasone, Remdesivir, baricitinib, albuterol, pulmonary toilet. Greater than 24 hrs on RA with no desaturations prior to discharge with movement.   - Narrow to Augmentin to treat for 7-day antibiotic course  - PRN albuterol for wheezing     Acute Hypoxic Respiratory Failure secondary to COVID-19 infection, nearly resolved: Admitted with new hypoxia. Workup as above. Weaned to room air with stable O2 sats with above treatment and pulmonary toilet. Intermittent O2 dips while asleep, but remained on RA for 24 hours prior to discharge with no desaturations after exercise.  - No indications for home O2 at discharge      CKD III with mild Cr rise: BL Cr around 1.5 over the past year. Cr 1.32 upon discharge  -Trend BMP with PCP in 1-2 weeks with PCP  - Hold nametone until checking Cr with PCP      Goals of care: Patient is DNR/DNI. Hospice consult considered but will plan for continued close OP follow up with PCP to discuss ongoing goals of care.      Hypothyroidism: TSH 5.5. Continue synthroid. Trend TFTs OP     Scoliosis,  chronic back pain: Continue supportive cares. Resume nabumetone after checking Cr function OP with PCP.      HTN, CAD,HLD, , Nonischemic cardiomyopathy: No acute concerns. Continue PTA meds     Alzheimer's dementia: Resume PTA memantadine    ASSESSMENT           Discharge Assessment  How are you doing now that you are home?: spoke with dtr Karen.  She said that mom was doing pretty good.  Home care nurse had already been there and PT was hopefully coming tomorrow.  How are your symptoms? (Red Flag symptoms escalate to triage hotline per guidelines): Improved  Do you feel your condition is stable enough to be safe at home until your provider visit?: Yes  Does the patient have their discharge instructions? : Yes  Does the patient have questions regarding their discharge instructions? : No  Were you started on any new medications or were there changes to any of your previous medications? : Yes  Does the patient have all of their medications?: Yes  Do you have questions regarding any of your medications? : No  Do you have all of your needed medical supplies or equipment (DME)?  (i.e. oxygen tank, CPAP, cane, etc.): Yes  Discharge follow-up appointment scheduled within 14 calendar days? : Yes  Discharge Follow Up Appointment Date: 10/23/23  Discharge Follow Up Appointment Scheduled with?: Primary Care Provider    Post-op ([facility] CTA Only)  If the patient had a surgery or procedure, do they have any questions for a nurse?: No    Post-op (Clinicians Only)  Did the patient have surgery or a procedure: No  Fever: No  Chills: No    Spoke with dtr Karen.  See note above.  Patient had a hospice consult while inpatient.  Family changed their mind due to improvement in patient's medical condition.  They have this information if needed for the future.  Home care has started today.  Denies any CCC needs.  Gave dtr the phone number for 24 nurse triage [phone] ([phone]).      PLAN                                                       Outpatient Plan:  Patient to follow up with PCP 10/23    Future Appointments   Date Time Provider Department Center   10/23/2023  3:00 PM Kehinde Tate MD [facility]   4/8/2024 10:40 AM Kehinde Tate MD [facility]         For any urgent concerns, please contact our 24 hour nurse triage line: [phone] ([phone])         Valencia Deutsch RN

## 2023-10-12 NOTE — PLAN OF CARE
Physical Therapy Discharge Summary    Reason for therapy discharge:    Discharged to home with home therapy.    Progress towards therapy goal(s). See goals on Care Plan in [facility] electronic health record for goal details.  Goals partially met.  Barriers to achieving goals:   discharge on same date as initial evaluation.    Therapy recommendation(s):    Continued therapy is recommended.  Rationale/Recommendations:  for home safety evaluation and progression of functional status.

## 2023-10-12 NOTE — TELEPHONE ENCOUNTER
Home Care is calling regarding an established patient with [facility].       Requesting orders from: Kehinde Tate  Provider is following patient: Yes  Is this a 60-day recertification request?  No    Orders Requested    Skilled Nursing  Request for initial certification (first set of orders)   Frequency: 1 time weeks x 4 weeks, followed by 1 visit every other week for 4 weeks    Physical Therapy  Request for initial evaluation and treatment (one time)     Information was gathered and will be sent to provider for review.  RN will contact Home Care with information after provider review.  Confirmed ok to leave a detailed message with call back.  Contact information confirmed and updated as needed.    Laura Mota RN